# Patient Record
Sex: FEMALE | HISPANIC OR LATINO | Employment: FULL TIME | URBAN - METROPOLITAN AREA
[De-identification: names, ages, dates, MRNs, and addresses within clinical notes are randomized per-mention and may not be internally consistent; named-entity substitution may affect disease eponyms.]

---

## 2020-07-02 ENCOUNTER — HOSPITAL ENCOUNTER (OUTPATIENT)
Dept: LAB | Facility: MEDICAL CENTER | Age: 25
End: 2020-07-02
Attending: PHYSICIAN ASSISTANT
Payer: COMMERCIAL

## 2020-07-02 LAB — CYTOLOGY REG CYTOL: NORMAL

## 2020-07-02 PROCEDURE — 88175 CYTOPATH C/V AUTO FLUID REDO: CPT

## 2020-08-10 ENCOUNTER — HOSPITAL ENCOUNTER (OUTPATIENT)
Dept: HOSPITAL 8 - OR | Age: 25
Setting detail: OBSERVATION
Discharge: HOME | End: 2020-08-10
Attending: EMERGENCY MEDICINE | Admitting: EMERGENCY MEDICINE
Payer: COMMERCIAL

## 2020-08-10 VITALS — BODY MASS INDEX: 33.59 KG/M2 | HEIGHT: 63 IN | WEIGHT: 189.6 LBS

## 2020-08-10 VITALS — SYSTOLIC BLOOD PRESSURE: 115 MMHG | DIASTOLIC BLOOD PRESSURE: 61 MMHG

## 2020-08-10 DIAGNOSIS — Z91.040: ICD-10-CM

## 2020-08-10 DIAGNOSIS — K66.1: ICD-10-CM

## 2020-08-10 DIAGNOSIS — Z03.818: Primary | ICD-10-CM

## 2020-08-10 DIAGNOSIS — N83.201: ICD-10-CM

## 2020-08-10 PROCEDURE — 87635 SARS-COV-2 COVID-19 AMP PRB: CPT

## 2020-08-10 PROCEDURE — 58662 LAPAROSCOPY EXCISE LESIONS: CPT

## 2020-08-10 PROCEDURE — G0378 HOSPITAL OBSERVATION PER HR: HCPCS

## 2020-08-10 PROCEDURE — 99284 EMERGENCY DEPT VISIT MOD MDM: CPT

## 2020-08-10 PROCEDURE — 88304 TISSUE EXAM BY PATHOLOGIST: CPT

## 2020-08-10 RX ADMIN — HYDROMORPHONE HYDROCHLORIDE PRN MG: 1 INJECTION, SOLUTION INTRAMUSCULAR; INTRAVENOUS; SUBCUTANEOUS at 06:19

## 2020-08-10 RX ADMIN — HYDROMORPHONE HYDROCHLORIDE PRN MG: 1 INJECTION, SOLUTION INTRAMUSCULAR; INTRAVENOUS; SUBCUTANEOUS at 06:24

## 2020-08-10 RX ADMIN — FENTANYL CITRATE PRN MCG: 50 INJECTION INTRAMUSCULAR; INTRAVENOUS at 06:11

## 2020-08-10 RX ADMIN — FENTANYL CITRATE PRN MCG: 50 INJECTION INTRAMUSCULAR; INTRAVENOUS at 06:16

## 2020-08-10 NOTE — NUR
Patient presents to ER c/o low quad abd pain, mostly in the RLQ. Patient was 
sent over from [facility] for gyn consult. Patient states she was dx with cysts on 
ovaries.

Patient is in NAD. Respirations even and unlabored.

## 2020-08-14 ENCOUNTER — HOSPITAL ENCOUNTER (EMERGENCY)
Dept: HOSPITAL 8 - ED | Age: 25
Discharge: HOME | End: 2020-08-14
Payer: COMMERCIAL

## 2020-08-14 VITALS — BODY MASS INDEX: 33.28 KG/M2 | WEIGHT: 187.83 LBS | HEIGHT: 63 IN

## 2020-08-14 VITALS — SYSTOLIC BLOOD PRESSURE: 125 MMHG | DIASTOLIC BLOOD PRESSURE: 60 MMHG

## 2020-08-14 DIAGNOSIS — R68.83: ICD-10-CM

## 2020-08-14 DIAGNOSIS — N94.6: Primary | ICD-10-CM

## 2020-08-14 DIAGNOSIS — R10.2: ICD-10-CM

## 2020-08-14 LAB
ALBUMIN SERPL-MCNC: 3.5 G/DL (ref 3.4–5)
ALP SERPL-CCNC: 107 U/L (ref 45–117)
ALT SERPL-CCNC: 16 U/L (ref 12–78)
ANION GAP SERPL CALC-SCNC: 3 MMOL/L (ref 5–15)
BASOPHILS # BLD AUTO: 0.03 X10^3/UL (ref 0–0.1)
BASOPHILS NFR BLD AUTO: 0 % (ref 0–1)
BILIRUB SERPL-MCNC: 0.4 MG/DL (ref 0.2–1)
CALCIUM SERPL-MCNC: 8.5 MG/DL (ref 8.5–10.1)
CHLORIDE SERPL-SCNC: 110 MMOL/L (ref 98–107)
CREAT SERPL-MCNC: 0.85 MG/DL (ref 0.55–1.02)
EOSINOPHIL # BLD AUTO: 0.05 X10^3/UL (ref 0–0.4)
EOSINOPHIL NFR BLD AUTO: 1 % (ref 1–7)
ERYTHROCYTE [DISTWIDTH] IN BLOOD BY AUTOMATED COUNT: 14.1 % (ref 9.6–15.2)
LYMPHOCYTES # BLD AUTO: 1.88 X10^3/UL (ref 1–3.4)
LYMPHOCYTES NFR BLD AUTO: 25 % (ref 22–44)
MCH RBC QN AUTO: 27.7 PG (ref 27–34.8)
MCHC RBC AUTO-ENTMCNC: 32.3 G/DL (ref 32.4–35.8)
MCV RBC AUTO: 85.7 FL (ref 80–100)
MD: NO
MICROSCOPIC: (no result)
MONOCYTES # BLD AUTO: 0.4 X10^3/UL (ref 0.2–0.8)
MONOCYTES NFR BLD AUTO: 5 % (ref 2–9)
NEUTROPHILS # BLD AUTO: 5.26 X10^3/UL (ref 1.8–6.8)
NEUTROPHILS NFR BLD AUTO: 69 % (ref 42–75)
PLATELET # BLD AUTO: 240 X10^3/UL (ref 130–400)
PMV BLD AUTO: 8.8 FL (ref 7.4–10.4)
PROT SERPL-MCNC: 7.5 G/DL (ref 6.4–8.2)
RBC # BLD AUTO: 4.68 X10^6/UL (ref 3.82–5.3)

## 2020-08-14 PROCEDURE — 76830 TRANSVAGINAL US NON-OB: CPT

## 2020-08-14 PROCEDURE — 96374 THER/PROPH/DIAG INJ IV PUSH: CPT

## 2020-08-14 PROCEDURE — 96361 HYDRATE IV INFUSION ADD-ON: CPT

## 2020-08-14 PROCEDURE — 81001 URINALYSIS AUTO W/SCOPE: CPT

## 2020-08-14 PROCEDURE — 36415 COLL VENOUS BLD VENIPUNCTURE: CPT

## 2020-08-14 PROCEDURE — 80053 COMPREHEN METABOLIC PANEL: CPT

## 2020-08-14 PROCEDURE — 87086 URINE CULTURE/COLONY COUNT: CPT

## 2020-08-14 PROCEDURE — 99284 EMERGENCY DEPT VISIT MOD MDM: CPT

## 2020-08-14 PROCEDURE — 85025 COMPLETE CBC W/AUTO DIFF WBC: CPT

## 2020-08-14 PROCEDURE — 83690 ASSAY OF LIPASE: CPT

## 2020-08-14 NOTE — NUR
PT AMBULATORY TO ROOM 19 W/ C/O RLQ ABD PAIN/CRAMPING. PT STATES SHE HAD 
OVARIAN TORSION SURGERY ON MONDAY AND SHE STARTED HER MENSTRUAL CYCLE 2 DAYS 
AGO AND WAS DOING WELL BUT TODAY HAD INCREASING PAIN/CRAMPING. STATES SHE HAS 
NEVER HAD PAIN LIKE THAT BEFORE. PT RESTING ON GURNEY. NADN. VSS. MONITORS 
APPLIED. PIV INITIATED. MEDICATED PER MAR.

## 2020-12-27 ENCOUNTER — HOSPITAL ENCOUNTER (EMERGENCY)
Facility: MEDICAL CENTER | Age: 25
End: 2020-12-27
Attending: EMERGENCY MEDICINE
Payer: COMMERCIAL

## 2020-12-27 VITALS
OXYGEN SATURATION: 99 % | DIASTOLIC BLOOD PRESSURE: 78 MMHG | TEMPERATURE: 98.5 F | HEART RATE: 95 BPM | SYSTOLIC BLOOD PRESSURE: 132 MMHG | RESPIRATION RATE: 17 BRPM

## 2020-12-27 DIAGNOSIS — B34.9 VIRAL SYNDROME: ICD-10-CM

## 2020-12-27 DIAGNOSIS — O21.9 NAUSEA AND VOMITING IN PREGNANCY: ICD-10-CM

## 2020-12-27 LAB
ALBUMIN SERPL BCP-MCNC: 3.6 G/DL (ref 3.2–4.9)
ALBUMIN/GLOB SERPL: 1.1 G/DL
ALP SERPL-CCNC: 76 U/L (ref 30–99)
ALT SERPL-CCNC: 6 U/L (ref 2–50)
ANION GAP SERPL CALC-SCNC: 11 MMOL/L (ref 7–16)
APPEARANCE UR: CLEAR
AST SERPL-CCNC: 14 U/L (ref 12–45)
BASOPHILS # BLD AUTO: 0.2 % (ref 0–1.8)
BASOPHILS # BLD: 0.01 K/UL (ref 0–0.12)
BILIRUB SERPL-MCNC: 0.2 MG/DL (ref 0.1–1.5)
BILIRUB UR QL STRIP.AUTO: NEGATIVE
BUN SERPL-MCNC: 5 MG/DL (ref 8–22)
CALCIUM SERPL-MCNC: 8.7 MG/DL (ref 8.5–10.5)
CHLORIDE SERPL-SCNC: 102 MMOL/L (ref 96–112)
CO2 SERPL-SCNC: 19 MMOL/L (ref 20–33)
COLOR UR: YELLOW
COVID ORDER STATUS COVID19: NORMAL
CREAT SERPL-MCNC: 0.57 MG/DL (ref 0.5–1.4)
EOSINOPHIL # BLD AUTO: 0.01 K/UL (ref 0–0.51)
EOSINOPHIL NFR BLD: 0.2 % (ref 0–6.9)
ERYTHROCYTE [DISTWIDTH] IN BLOOD BY AUTOMATED COUNT: 45.1 FL (ref 35.9–50)
FLUAV RNA SPEC QL NAA+PROBE: NEGATIVE
FLUBV RNA SPEC QL NAA+PROBE: NEGATIVE
GLOBULIN SER CALC-MCNC: 3.2 G/DL (ref 1.9–3.5)
GLUCOSE SERPL-MCNC: 91 MG/DL (ref 65–99)
GLUCOSE UR STRIP.AUTO-MCNC: NEGATIVE MG/DL
HCT VFR BLD AUTO: 40.3 % (ref 37–47)
HGB BLD-MCNC: 13 G/DL (ref 12–16)
IMM GRANULOCYTES # BLD AUTO: 0.01 K/UL (ref 0–0.11)
IMM GRANULOCYTES NFR BLD AUTO: 0.2 % (ref 0–0.9)
KETONES UR STRIP.AUTO-MCNC: ABNORMAL MG/DL
LEUKOCYTE ESTERASE UR QL STRIP.AUTO: NEGATIVE
LIPASE SERPL-CCNC: 24 U/L (ref 11–82)
LYMPHOCYTES # BLD AUTO: 0.71 K/UL (ref 1–4.8)
LYMPHOCYTES NFR BLD: 14.3 % (ref 22–41)
MCH RBC QN AUTO: 27.8 PG (ref 27–33)
MCHC RBC AUTO-ENTMCNC: 32.3 G/DL (ref 33.6–35)
MCV RBC AUTO: 86.3 FL (ref 81.4–97.8)
MICRO URNS: ABNORMAL
MONOCYTES # BLD AUTO: 0.43 K/UL (ref 0–0.85)
MONOCYTES NFR BLD AUTO: 8.7 % (ref 0–13.4)
NEUTROPHILS # BLD AUTO: 3.78 K/UL (ref 2–7.15)
NEUTROPHILS NFR BLD: 76.4 % (ref 44–72)
NITRITE UR QL STRIP.AUTO: NEGATIVE
NRBC # BLD AUTO: 0 K/UL
NRBC BLD-RTO: 0 /100 WBC
PH UR STRIP.AUTO: 6.5 [PH] (ref 5–8)
PLATELET # BLD AUTO: 173 K/UL (ref 164–446)
PMV BLD AUTO: 11.5 FL (ref 9–12.9)
POTASSIUM SERPL-SCNC: 3.8 MMOL/L (ref 3.6–5.5)
PROT SERPL-MCNC: 6.8 G/DL (ref 6–8.2)
PROT UR QL STRIP: NEGATIVE MG/DL
RBC # BLD AUTO: 4.67 M/UL (ref 4.2–5.4)
RBC UR QL AUTO: NEGATIVE
SARS-COV-2 RNA RESP QL NAA+PROBE: DETECTED
SODIUM SERPL-SCNC: 132 MMOL/L (ref 135–145)
SP GR UR STRIP.AUTO: 1.02
SPECIMEN SOURCE: ABNORMAL
UROBILINOGEN UR STRIP.AUTO-MCNC: 1 MG/DL
WBC # BLD AUTO: 5 K/UL (ref 4.8–10.8)

## 2020-12-27 PROCEDURE — 99284 EMERGENCY DEPT VISIT MOD MDM: CPT

## 2020-12-27 PROCEDURE — U0003 INFECTIOUS AGENT DETECTION BY NUCLEIC ACID (DNA OR RNA); SEVERE ACUTE RESPIRATORY SYNDROME CORONAVIRUS 2 (SARS-COV-2) (CORONAVIRUS DISEASE [COVID-19]), AMPLIFIED PROBE TECHNIQUE, MAKING USE OF HIGH THROUGHPUT TECHNOLOGIES AS DESCRIBED BY CMS-2020-01-R: HCPCS

## 2020-12-27 PROCEDURE — C9803 HOPD COVID-19 SPEC COLLECT: HCPCS | Performed by: EMERGENCY MEDICINE

## 2020-12-27 PROCEDURE — 80053 COMPREHEN METABOLIC PANEL: CPT

## 2020-12-27 PROCEDURE — 85025 COMPLETE CBC W/AUTO DIFF WBC: CPT

## 2020-12-27 PROCEDURE — 87502 INFLUENZA DNA AMP PROBE: CPT

## 2020-12-27 PROCEDURE — 83690 ASSAY OF LIPASE: CPT

## 2020-12-27 PROCEDURE — 81003 URINALYSIS AUTO W/O SCOPE: CPT

## 2020-12-27 PROCEDURE — 99283 EMERGENCY DEPT VISIT LOW MDM: CPT

## 2020-12-27 PROCEDURE — 700105 HCHG RX REV CODE 258: Performed by: EMERGENCY MEDICINE

## 2020-12-27 RX ORDER — ONDANSETRON 4 MG/1
4 TABLET, ORALLY DISINTEGRATING ORAL EVERY 6 HOURS PRN
Qty: 10 TAB | Refills: 0 | Status: SHIPPED | OUTPATIENT
Start: 2020-12-27 | End: 2021-03-06

## 2020-12-27 RX ORDER — SODIUM CHLORIDE 9 MG/ML
1000 INJECTION, SOLUTION INTRAVENOUS ONCE
Status: COMPLETED | OUTPATIENT
Start: 2020-12-27 | End: 2020-12-27

## 2020-12-27 RX ADMIN — SODIUM CHLORIDE 1000 ML: 9 INJECTION, SOLUTION INTRAVENOUS at 08:29

## 2020-12-27 NOTE — ED PROVIDER NOTES
ED Provider Note    CHIEF COMPLAINT  Chief Complaint   Patient presents with   • Flu Like Symptoms     x 1 day. N/V, congestion, febrile @ 100F at home 1 x 500mg tylenol, body aches, chills.   • Pregnancy     17wks preg.        HPI  Barbara David is a 25 y.o. female who presents to the emergency department with complaint of flulike symptoms.  Headache, body ache, backache is increased in severity for the last 3 days.  She is 17 weeks pregnant she is a  female.  In addition, she does endorse slight uterine cramping that is intermittent, no vaginal bleeding, vaginal discharge.  She does follow-up with an OB gynecologist for this pregnancy has had no problems thus far.  Patient has had a subjective fever took Tylenol for pain recuperated from the fever.  The patient denies, shakes, chills, sweats, dysuria, hematochezia, melena, hematemesis, vaginal bleeding, vaginal discharge.    REVIEW OF SYSTEMS  Positives as above. Pertinent negatives include marily pain, dysuria, [place], melena, hematemesis all other 10 review of systems are negative    PAST MEDICAL HISTORY  Past Medical History:   Diagnosis Date   • Family history of diabetes mellitus (DM) 2016   • GBS (group B Streptococcus carrier), +RV culture, currently pregnant 2015   • Late prenatal care-1st visit at 22wks 2015   • Supervision of normal first teen pregnancy 2015   • Urinary tract infection, site not specified        FAMILY HISTORY  Noncontributory    SOCIAL HISTORY  Social History     Socioeconomic History   • Marital status: Single     Spouse name: Not on file   • Number of children: Not on file   • Years of education: Not on file   • Highest education level: Not on file   Occupational History   • Not on file   Social Needs   • Financial resource strain: Not on file   • Food insecurity     Worry: Not on file     Inability: Not on file   • Transportation needs     Medical: Not on file     Non-medical: Not on  file   Tobacco Use   • Smoking status: Never Smoker   • Smokeless tobacco: Never Used   Substance and Sexual Activity   • Alcohol use: Yes     Comment: occ   • Drug use: No   • Sexual activity: Yes     Partners: Male     Comment: none   Lifestyle   • Physical activity     Days per week: Not on file     Minutes per session: Not on file   • Stress: Not on file   Relationships   • Social connections     Talks on phone: Not on file     Gets together: Not on file     Attends [employer] service: Not on file     Active member of club or organization: Not on file     Attends meetings of clubs or organizations: Not on file     Relationship status: Not on file   • Intimate partner violence     Fear of current or ex partner: Not on file     Emotionally abused: Not on file     Physically abused: Not on file     Forced sexual activity: Not on file   Other Topics Concern   • Not on file   Social History Narrative   • Not on file       SURGICAL HISTORY  History reviewed. No pertinent surgical history.    CURRENT MEDICATIONS  Home Medications    **Home medications have not yet been reviewed for this encounter**         ALLERGIES  Allergies   Allergen Reactions   • Codeine Swelling       PHYSICAL EXAM  VITAL SIGNS: /78   Pulse 95   Temp 36.9 °C (98.5 °F) (Temporal)   Resp 17   SpO2 99%      Constitutional: Well developed, Well nourished, No acute distress, Non-toxic appearance.   Eyes: PERRLA, EOMI, Conjunctiva normal, No discharge.   Cardiovascular: Tachycardic,  No murmurs, No rubs, No gallops, and intact distal pulses.   Thorax & Lungs:  No respiratory distress, no rales, no rhonchi, No wheezing, No chest wall tenderness.   Abdomen: Bowel sounds normal, Soft, fundal height approximately 3 cm inferior of the umbilicus negative Mejia sign no tenderness, No guarding, No rebound, No pulsatile masses.   Skin: Warm, Dry, No erythema, No rash.  Back: No CVA tenderness  Pelvic: Presence of female nurse, no external lesion or  discharge, speculum exam shows significant greenish discharge, bimanual examination no cervical motion tenderness, no axial tenderness bilaterally no vaginal bleeding  Extremities: Full range of motion, no deformity, no edema.  Neurologic: Alert & oriented x 3, No focal deficits noted, acting appropriately on exam.  Psychiatric: Affect normal for clinical presentation.      RADIOLOGY/PROCEDURES  Fetal heart tones  Results for orders placed or performed during the hospital encounter of 12/27/20   CBC WITH DIFFERENTIAL   Result Value Ref Range    WBC 5.0 4.8 - 10.8 K/uL    RBC 4.67 4.20 - 5.40 M/uL    Hemoglobin 13.0 12.0 - 16.0 g/dL    Hematocrit 40.3 37.0 - 47.0 %    MCV 86.3 81.4 - 97.8 fL    MCH 27.8 27.0 - 33.0 pg    MCHC 32.3 (L) 33.6 - 35.0 g/dL    RDW 45.1 35.9 - 50.0 fL    Platelet Count 173 164 - 446 K/uL    MPV 11.5 9.0 - 12.9 fL    Neutrophils-Polys 76.40 (H) 44.00 - 72.00 %    Lymphocytes 14.30 (L) 22.00 - 41.00 %    Monocytes 8.70 0.00 - 13.40 %    Eosinophils 0.20 0.00 - 6.90 %    Basophils 0.20 0.00 - 1.80 %    Immature Granulocytes 0.20 0.00 - 0.90 %    Nucleated RBC 0.00 /100 WBC    Neutrophils (Absolute) 3.78 2.00 - 7.15 K/uL    Lymphs (Absolute) 0.71 (L) 1.00 - 4.80 K/uL    Monos (Absolute) 0.43 0.00 - 0.85 K/uL    Eos (Absolute) 0.01 0.00 - 0.51 K/uL    Baso (Absolute) 0.01 0.00 - 0.12 K/uL    Immature Granulocytes (abs) 0.01 0.00 - 0.11 K/uL    NRBC (Absolute) 0.00 K/uL   COMP METABOLIC PANEL   Result Value Ref Range    Sodium 132 (L) 135 - 145 mmol/L    Potassium 3.8 3.6 - 5.5 mmol/L    Chloride 102 96 - 112 mmol/L    Co2 19 (L) 20 - 33 mmol/L    Anion Gap 11.0 7.0 - 16.0    Glucose 91 65 - 99 mg/dL    Bun 5 (L) 8 - 22 mg/dL    Creatinine 0.57 0.50 - 1.40 mg/dL    Calcium 8.7 8.5 - 10.5 mg/dL    AST(SGOT) 14 12 - 45 U/L    ALT(SGPT) 6 2 - 50 U/L    Alkaline Phosphatase 76 30 - 99 U/L    Total Bilirubin 0.2 0.1 - 1.5 mg/dL    Albumin 3.6 3.2 - 4.9 g/dL    Total Protein 6.8 6.0 - 8.2 g/dL     Globulin 3.2 1.9 - 3.5 g/dL    A-G Ratio 1.1 g/dL   LIPASE   Result Value Ref Range    Lipase 24 11 - 82 U/L   URINALYSIS    Specimen: Urine   Result Value Ref Range    Color Yellow     Character Clear     Specific Gravity 1.023 <1.035    Ph 6.5 5.0 - 8.0    Glucose Negative Negative mg/dL    Ketones Trace (A) Negative mg/dL    Protein Negative Negative mg/dL    Bilirubin Negative Negative    Urobilinogen, Urine 1.0 Negative    Nitrite Negative Negative    Leukocyte Esterase Negative Negative    Occult Blood Negative Negative    Micro Urine Req see below    COVID/SARS CoV-2 PCR    Specimen: Nasopharyngeal; Respirate   Result Value Ref Range    COVID Order Status Received    Influenza A/B By PCR (Adult - Flu Only)    Specimen: Nasopharyngeal; Respirate   Result Value Ref Range    Influenza virus A RNA Negative Negative    Influenza virus B, PCR Negative Negative   ESTIMATED GFR   Result Value Ref Range    GFR If African American >60 >60 mL/min/1.73 m 2    GFR If Non African American >60 >60 mL/min/1.73 m 2   SARS-CoV-2, PCR (In-House)   Result Value Ref Range    SARS-CoV-2 Source NP Swab      COURSE & MEDICAL DECISION MAKING  Pertinent Labs & Imaging studies reviewed. (See chart for details)  This is a pleasant 25-year-old female presents 17 weeks pregnant.  The patient normal fetal heart tones I do not believe she is having premature labor at this point time.  The patient no abdominal tenderness suspicious for appendicitis, ovarian torsion, ovarian cyst, any significant surgical condition.  Urinalysis was negative for infection and influenza was negative as well.  IV is established, she received 1 L normal saline econdary to her dehydrated state and tachycardia.  Reevaluation, she had a normal heart rate, moist mucous membranes, states she does not feel dehydrated.  In addition, the patient received Zofran 4 mg IV.  A Covid test was obtained and she understands to follow-up with results tomorrow with strict return  precautions have been given.  She understands the discharge instructions if she is positive for Covid.  The patient will be following up with her OB gynecologist and strict return precautions have been given for increasing symptomatology.  Her discharge, she is positive p.o., she is asymptomatic.    FINAL IMPRESSION     1. Viral syndrome Active   2. Nausea and vomiting in pregnancy Active       DISPOSITION:  Patient will be discharged home in stable condition.    FOLLOW UP:  [facility], Emergency Dept  [address] [facility] [address] [phone]    If symptoms worsen      OUTPATIENT MEDICATIONS:  Discharge Medication List as of 12/27/2020 10:18 AM      START taking these medications    Details   ondansetron (ZOFRAN ODT) 4 MG TABLET DISPERSIBLE Take 1 Tab by mouth every 6 hours as needed for Nausea., Disp-10 Tab, R-0, Normal               Electronically signed by: Griffin Marquis D.O., 12/27/2020 8:15 AM

## 2020-12-27 NOTE — DISCHARGE INSTRUCTIONS
You have been tested for COVID-19 today.  Your results are pending.  Please act as if these results are positive and self isolate until you receive the results.  Make sure you still wear a mask, social distance and practice good hand hygiene no matter the result.  In order to receive the results you will need to log into your [employer] on the [employer] website.  If you do not have an account you can create an account.  You can login or create an account for my chart at [email or web address].  If your symptoms worsen to a point that you become so short of breath you can only walk a very short distances prior to fatigue or feel you were unable to manage your symptoms at home, please return to the emergency department.  For a more objective approach you can buy a pulse oximeter online.  [employer] has multiple to choose from.  If your oxygen saturation in these devices is persistently below 90% please return to the ER.

## 2020-12-27 NOTE — ED TRIAGE NOTES
Chief Complaint   Patient presents with   • Flu Like Symptoms     x 1 day. N/V, congestion, febrile @ 100F at home 1 x 500mg tylenol, body aches, chills.   • Pregnancy     17wks preg.      Pt began having Flu like s/s 1 day PTA. 17 wks preg. N/V, chills, febrile, body aches. Non productive dry cough. Denies CP.    /75   Pulse (!) 104   Temp 36.9 °C (98.5 °F) (Temporal)   Resp 16   SpO2 98%

## 2020-12-27 NOTE — ED NOTES
Pt provided with written and verbal discharge instructions. Instructed to follow up with OB or return with worsening s/s. Denies nausea at this time. Tolerating PO intake. Instructed on proper quarantine until test results. Pt verbalized understanding. IV removed. Exited the ED without incident.

## 2020-12-27 NOTE — ED NOTES
Break RN:  Patient ambulatory to restroom to obtain a Urine Specimen. Pt was given instruction on proper collection- Pt verbalized understanding. Pt to return specimen cup to room when finished.

## 2021-01-26 LAB
ABO GROUP BLD: NORMAL
HBV SURFACE AG SERPL QL IA: NEGATIVE
HIV 1+2 AB+HIV1 P24 AG SERPL QL IA: NON REACTIVE
RH BLD: NORMAL
RUBV IGG SERPL IA-ACNC: NORMAL
TREPONEMA PALLIDUM IGG+IGM AB [PRESENCE] IN SERUM OR PLASMA BY IMMUNOASSAY: NON REACTIVE

## 2021-01-30 ENCOUNTER — OFFICE VISIT (OUTPATIENT)
Dept: URGENT CARE | Facility: CLINIC | Age: 26
End: 2021-01-30
Payer: COMMERCIAL

## 2021-01-30 VITALS
HEART RATE: 78 BPM | RESPIRATION RATE: 16 BRPM | DIASTOLIC BLOOD PRESSURE: 60 MMHG | BODY MASS INDEX: 30.9 KG/M2 | TEMPERATURE: 98.3 F | SYSTOLIC BLOOD PRESSURE: 90 MMHG | HEIGHT: 64 IN | OXYGEN SATURATION: 97 % | WEIGHT: 181 LBS

## 2021-01-30 DIAGNOSIS — O26.892 ABDOMINAL PAIN DURING PREGNANCY IN SECOND TRIMESTER: ICD-10-CM

## 2021-01-30 DIAGNOSIS — R82.998 LEUKOCYTES IN URINE: ICD-10-CM

## 2021-01-30 DIAGNOSIS — R10.9 ABDOMINAL PAIN DURING PREGNANCY IN SECOND TRIMESTER: ICD-10-CM

## 2021-01-30 DIAGNOSIS — R10.31 RIGHT LOWER QUADRANT ABDOMINAL PAIN: ICD-10-CM

## 2021-01-30 LAB
APPEARANCE UR: CLEAR
BILIRUB UR STRIP-MCNC: NEGATIVE MG/DL
COLOR UR AUTO: YELLOW
GLUCOSE UR STRIP.AUTO-MCNC: NEGATIVE MG/DL
KETONES UR STRIP.AUTO-MCNC: NORMAL MG/DL
LEUKOCYTE ESTERASE UR QL STRIP.AUTO: NORMAL
NITRITE UR QL STRIP.AUTO: NEGATIVE
PH UR STRIP.AUTO: 7.5 [PH] (ref 5–8)
PROT UR QL STRIP: NEGATIVE MG/DL
RBC UR QL AUTO: NEGATIVE
SP GR UR STRIP.AUTO: 1.02
UROBILINOGEN UR STRIP-MCNC: 0.2 MG/DL

## 2021-01-30 PROCEDURE — 99202 OFFICE O/P NEW SF 15 MIN: CPT | Performed by: NURSE PRACTITIONER

## 2021-01-30 PROCEDURE — 81002 URINALYSIS NONAUTO W/O SCOPE: CPT | Performed by: NURSE PRACTITIONER

## 2021-01-30 ASSESSMENT — ENCOUNTER SYMPTOMS
VOMITING: 1
FEVER: 0
NAUSEA: 1
ABDOMINAL PAIN: 1

## 2021-01-30 ASSESSMENT — FIBROSIS 4 INDEX: FIB4 SCORE: 0.83

## 2021-01-30 NOTE — LETTER
[facility]  [address]  [place] [address]     January 30, 2021    Patient: Barbara David   YOB: 1995   Date of Visit: 1/30/2021       To Whom It May Concern:    Barbara David was seen and treated in our department on 1/30/2021.     Sincerely,     Mike Raines, Med Ass't

## 2021-01-30 NOTE — LETTER
January 30, 2021    To Whom It May Concern:         This is confirmation that Barbara Mayer David attended her scheduled appointment with BRADY Vigil on 1/30/21.         If you have any questions please do not hesitate to call me at the phone number listed below.    Sincerely,          Mike Raines, Med Ass't  [phone]

## 2021-01-30 NOTE — PROGRESS NOTES
"  Subjective:     Barbara David is a 25 y.o. female who presents for Abdominal Pain (x 1 week, RLQ/RUQ, pain when having BM,6/10)      Presents for RLQ abdominal pain. Abdominal pain increased with bowel movements, has had a BM a least once daily. Pain on right side, lasts a couple hours after BM. Pressure to push with BM increases pain. Pain to RLQ just now with urination. Pain 6/10 at it's worse. Feels like a stomach cramp, \"really bad period day\".  No dysuria.   No rectal pain with BM, or blood in stool. N/V normal throughout pregnancy. Pain resolves with laying down. Ob/GYN thought it was constipation. Feels fetal  Movement. Denies vaginal bleeding.  Has OB/GYN appt on Monday. Hx of UTIs. LMP 9/2020. Est 19-20 weeks regnant.     Abdominal Pain  This is a new problem. The current episode started in the past 7 days. The problem occurs intermittently. The problem has been gradually worsening. The pain is located in the RLQ and RUQ. The pain is at a severity of 4/10. The pain is moderate. The quality of the pain is cramping. Associated symptoms include nausea and vomiting. Pertinent negatives include no dysuria, fever or hematuria. The pain is aggravated by bowel movement and urination.       Past Medical History:   Diagnosis Date   • Family history of diabetes mellitus (DM) 6/2/2016   • GBS (group B Streptococcus carrier), +RV culture, currently pregnant 4/20/2015   • Late prenatal care-1st visit at 22wks 1/7/2015   • Supervision of normal first teen pregnancy 1/7/2015   • Urinary tract infection, site not specified 2014       History reviewed. No pertinent surgical history.    Social History     Socioeconomic History   • Marital status: Single     Spouse name: Not on file   • Number of children: Not on file   • Years of education: Not on file   • Highest education level: Not on file   Occupational History   • Not on file   Social Needs   • Financial resource strain: Not on file   • Food insecurity " "    Worry: Not on file     Inability: Not on file   • Transportation needs     Medical: Not on file     Non-medical: Not on file   Tobacco Use   • Smoking status: Never Smoker   • Smokeless tobacco: Never Used   Substance and Sexual Activity   • Alcohol use: Not Currently     Comment: occ   • Drug use: No   • Sexual activity: Yes     Partners: Male     Comment: none   Lifestyle   • Physical activity     Days per week: Not on file     Minutes per session: Not on file   • Stress: Not on file   Relationships   • Social connections     Talks on phone: Not on file     Gets together: Not on file     Attends Mosque service: Not on file     Active member of club or organization: Not on file     Attends meetings of clubs or organizations: Not on file     Relationship status: Not on file   • Intimate partner violence     Fear of current or ex partner: Not on file     Emotionally abused: Not on file     Physically abused: Not on file     Forced sexual activity: Not on file   Other Topics Concern   • Not on file   Social History Narrative   • Not on file        Family History   Problem Relation Age of Onset   • Heart Disease Maternal Grandfather    • Diabetes Father    • Alcohol/Drug Paternal Grandfather    • Diabetes Paternal Grandfather    • Diabetes Paternal Aunt    • Stroke Neg Hx    • Cancer Neg Hx    • Lung Disease Neg Hx    • Hypertension Neg Hx         Allergies   Allergen Reactions   • Codeine Swelling       Review of Systems   Constitutional: Negative for fever.   Respiratory: Negative for shortness of breath.    Cardiovascular: Negative for chest pain.   Gastrointestinal: Positive for abdominal pain, nausea and vomiting. Negative for blood in stool.        Harder stools.    Genitourinary: Negative for dysuria, flank pain and hematuria.   All other systems reviewed and are negative.       Objective:   BP (!) 90/60   Pulse 78   Temp 36.8 °C (98.3 °F) (Temporal)   Resp 16   Ht 1.613 m (5' 3.5\")   Wt 82.1 kg (181 " lb)   SpO2 97%   BMI 31.56 kg/m²     Physical Exam  Vitals signs reviewed.   Constitutional:       General: She is not in acute distress.     Appearance: She is well-developed.   HENT:      Head: Normocephalic and atraumatic.      Right Ear: External ear normal.      Left Ear: External ear normal.      Nose: Nose normal.   Eyes:      Conjunctiva/sclera: Conjunctivae normal.   Neck:      Musculoskeletal: Normal range of motion.   Cardiovascular:      Rate and Rhythm: Normal rate.   Pulmonary:      Effort: Pulmonary effort is normal. No respiratory distress.      Breath sounds: Normal breath sounds.   Abdominal:      General: Bowel sounds are normal.      Palpations: Abdomen is soft.      Tenderness: There is abdominal tenderness in the right lower quadrant. There is no right CVA tenderness, left CVA tenderness or rebound.      Comments: Elevated fundus. Just below umbilicus.   Musculoskeletal: Normal range of motion.   Skin:     General: Skin is warm and dry.      Findings: No rash.   Neurological:      General: No focal deficit present.      Mental Status: She is alert and oriented to person, place, and time.      GCS: GCS eye subscore is 4. GCS verbal subscore is 5. GCS motor subscore is 6.   Psychiatric:         Mood and Affect: Mood normal.         Speech: Speech normal.         Behavior: Behavior normal.         Thought Content: Thought content normal.         Judgment: Judgment normal.         Assessment/Plan:   1. Abdominal pain during pregnancy in second trimester  - POCT Urinalysis    2. Right lower quadrant abdominal pain  - POCT Urinalysis    3. Leukocytes in urine  - POCT Urinalysis    Results for orders placed or performed in visit on 01/30/21   POCT Urinalysis   Result Value Ref Range    POC Color yellow Negative    POC Appearance clear Negative    POC Leukocyte Esterase Trace Negative    POC Nitrites negative Negative    POC Urobiligen 0.2 Negative (0.2) mg/dL    POC Protein negative Negative mg/dL     POC Urine PH 7.5 5.0 - 8.0    POC Blood negative Negative    POC Specific Gravity 1.025 <1.005 - >1.030    POC Ketones Trace Negative mg/dL    POC Bilirubin negative Negative mg/dL    POC Glucose negative Negative mg/dL   Discussed follow up in the ER for further evaluation of cramping RLQ pain. Reported pain 6/10. Discussed risks and benefits. Patient agrees with plan. Previously discussed increasing water intake, activity level, and dietary fiber for harder stools.     Differential diagnosis, natural history, supportive care, and indications for immediate follow-up discussed.    My total time spent caring for the patient on the day of the encounter was 20 minutes.   This does not include time spent on separately billable procedures/tests.

## 2021-02-04 ASSESSMENT — ENCOUNTER SYMPTOMS
BLOOD IN STOOL: 0
SHORTNESS OF BREATH: 0
FLANK PAIN: 0

## 2021-02-04 NOTE — PATIENT INSTRUCTIONS
Abdominal Pain During Pregnancy    Abdominal pain is common during pregnancy, and has many possible causes. Some causes are more serious than others, and sometimes the cause is not known. Abdominal pain can be a sign that labor is starting. It can also be caused by normal growth and stretching of muscles and ligaments during pregnancy. Always tell your health care provider if you have any abdominal pain.  Follow these instructions at home:  · Do not have sex or put anything in your vagina until your pain goes away completely.  · Get plenty of rest until your pain improves.  · Drink enough fluid to keep your urine pale yellow.  · Take over-the-counter and prescription medicines only as told by your health care provider.  · Keep all follow-up visits as told by your health care provider. This is important.  Contact a health care provider if:  · Your pain continues or gets worse after resting.  · You have lower abdominal pain that:  ? Comes and goes at regular intervals.  ? Spreads to your back.  ? Is similar to menstrual cramps.  · You have pain or burning when you urinate.  Get help right away if:  · You have a fever or chills.  · You have vaginal bleeding.  · You are leaking fluid from your vagina.  · You are passing tissue from your vagina.  · You have vomiting or diarrhea that lasts for more than 24 hours.  · Your baby is moving less than usual.  · You feel very weak or faint.  · You have shortness of breath.  · You develop severe pain in your upper abdomen.  Summary  · Abdominal pain is common during pregnancy, and has many possible causes.  · If you experience abdominal pain during pregnancy, tell your health care provider right away.  · Follow your health care provider's home care instructions and keep all follow-up visits as directed.  This information is not intended to replace advice given to you by your health care provider. Make sure you discuss any questions you have with your health care  provider.  Document Released: 12/18/2006 Document Revised: 04/06/2020 Document Reviewed: 03/22/2018  Elsevier Patient Education © 2020 [employer]

## 2021-03-06 ENCOUNTER — HOSPITAL ENCOUNTER (OUTPATIENT)
Facility: MEDICAL CENTER | Age: 26
End: 2021-03-06
Attending: PHYSICIAN ASSISTANT
Payer: COMMERCIAL

## 2021-03-06 ENCOUNTER — OFFICE VISIT (OUTPATIENT)
Dept: URGENT CARE | Facility: PHYSICIAN GROUP | Age: 26
End: 2021-03-06
Payer: COMMERCIAL

## 2021-03-06 VITALS
DIASTOLIC BLOOD PRESSURE: 82 MMHG | HEIGHT: 64 IN | BODY MASS INDEX: 32.27 KG/M2 | HEART RATE: 97 BPM | WEIGHT: 189 LBS | OXYGEN SATURATION: 96 % | SYSTOLIC BLOOD PRESSURE: 118 MMHG | TEMPERATURE: 98.2 F

## 2021-03-06 DIAGNOSIS — R35.0 URINARY FREQUENCY: ICD-10-CM

## 2021-03-06 LAB
APPEARANCE UR: CLEAR
BILIRUB UR STRIP-MCNC: NEGATIVE MG/DL
COLOR UR AUTO: YELLOW
GLUCOSE UR STRIP.AUTO-MCNC: NEGATIVE MG/DL
KETONES UR STRIP.AUTO-MCNC: NEGATIVE MG/DL
LEUKOCYTE ESTERASE UR QL STRIP.AUTO: NORMAL
NITRITE UR QL STRIP.AUTO: NEGATIVE
PH UR STRIP.AUTO: 7.5 [PH] (ref 5–8)
PROT UR QL STRIP: NEGATIVE MG/DL
RBC UR QL AUTO: NEGATIVE
SP GR UR STRIP.AUTO: 1.02
UROBILINOGEN UR STRIP-MCNC: 0.2 MG/DL

## 2021-03-06 PROCEDURE — 99213 OFFICE O/P EST LOW 20 MIN: CPT | Performed by: PHYSICIAN ASSISTANT

## 2021-03-06 PROCEDURE — 81002 URINALYSIS NONAUTO W/O SCOPE: CPT | Performed by: PHYSICIAN ASSISTANT

## 2021-03-06 PROCEDURE — 87086 URINE CULTURE/COLONY COUNT: CPT

## 2021-03-06 ASSESSMENT — FIBROSIS 4 INDEX: FIB4 SCORE: 0.86

## 2021-03-06 ASSESSMENT — ENCOUNTER SYMPTOMS
NAUSEA: 0
VOMITING: 0
FEVER: 0
PALPITATIONS: 0
SHORTNESS OF BREATH: 0
BACK PAIN: 0
ABDOMINAL PAIN: 0
FLANK PAIN: 0
CHILLS: 0
COUGH: 0

## 2021-03-06 NOTE — LETTER
March 6, 2021         Patient: Barbara David   YOB: 1995   Date of Visit: 3/6/2021           To Whom it May Concern:    Barbara David was seen in my clinic on 3/6/2021. Please excuse her from work on this day.    If you have any questions or concerns, please don't hesitate to call.        Sincerely,           Robert Lima P.A.-C.  Electronically Signed

## 2021-03-06 NOTE — PROGRESS NOTES
Subjective:   Barbara David is a 26 y.o. female who presents for UTI (uti(2x week; burning with urination, frequency, urgency, fullness) )      UTI  This is a new problem. The current episode started in the past 7 days. The problem occurs constantly. Associated symptoms include urinary symptoms. Pertinent negatives include no abdominal pain, chest pain, chills, coughing, fever, nausea or vomiting. Nothing aggravates the symptoms. She has tried nothing for the symptoms.       Review of Systems   Constitutional: Negative for chills and fever.   Respiratory: Negative for cough and shortness of breath.    Cardiovascular: Negative for chest pain and palpitations.   Gastrointestinal: Negative for abdominal pain, nausea and vomiting.   Genitourinary: Positive for frequency and urgency. Negative for dysuria, flank pain and hematuria.   Musculoskeletal: Negative for back pain.   All other systems reviewed and are negative.      Medications:    • ergocalciferol  • norethindrone-ethinyl estradiol  • ondansetron Tbdp    Allergies: Codeine    Problem List: Barbara David has Midline low back pain without sciatica; Obesity (BMI 30-39.9); Vitamin D deficiency; and Family history of diabetes mellitus (DM) on their problem list.    Surgical History:  No past surgical history on file.    Past Social Hx: Barbara David  reports that she has never smoked. She has never used smokeless tobacco. She reports previous alcohol use. She reports that she does not use drugs.     Past Family Hx:  Barbara David family history includes Alcohol/Drug in her paternal grandfather; Diabetes in her father, paternal aunt, and paternal grandfather; Heart Disease in her maternal grandfather.     Problem list, medications, and allergies reviewed by myself today in Epic.     Objective:     Blood Pressure 118/82 (BP Location: Left arm, Patient Position: Sitting, BP Cuff Size: Adult)   Pulse 97  "  Temperature 36.8 °C (98.2 °F) (Temporal)   Height 1.613 m (5' 3.5\")   Weight 85.7 kg (189 lb)   Oxygen Saturation 96%   Body Mass Index 32.95 kg/m²     Physical Exam  Vitals reviewed.   Constitutional:       Appearance: She is well-developed.   HENT:      Head: Normocephalic and atraumatic.      Right Ear: External ear normal.      Left Ear: External ear normal.      Nose: Nose normal.   Cardiovascular:      Rate and Rhythm: Normal rate and regular rhythm.      Heart sounds: Normal heart sounds.   Pulmonary:      Effort: Pulmonary effort is normal.      Breath sounds: Normal breath sounds.   Abdominal:      General: Bowel sounds are normal. There is no distension.      Palpations: Abdomen is soft. There is no mass.      Tenderness: There is no abdominal tenderness. There is no right CVA tenderness, left CVA tenderness, guarding or rebound.      Hernia: No hernia is present.   Musculoskeletal:      Cervical back: Normal range of motion and neck supple.   Skin:     General: Skin is warm and dry.   Neurological:      Mental Status: She is alert and oriented to person, place, and time.   Psychiatric:         Behavior: Behavior normal.         Thought Content: Thought content normal.         Judgment: Judgment normal.         Assessment/Plan:     Medical Decision Making/Comments     Pt is a pregnant (25 weeks) 26 yr old female who presents for evaluation of urinary frequency.  Pt states she has had frequency and urgency for 4 days.  Denies dysuria, fevers, flank pain/chills, nausea/vomiting, or vaginal discharge/bleeding.  Pt is not diabetic or immunocompromised.  No use of catheters.  Vital signs normal.  Pt does not appear ill or altered mental status.  There is no PTP of the abdomen or CVA tenderness.  UA unremarkable.  Will hold treatment while culture is pending   Diagnosis and associated orders     1. Urinary frequency  POCT Urinalysis    Urine Culture              Differential diagnosis, natural history, " supportive care, and indications for immediate follow-up discussed.    Advised the patient to follow-up with the primary care physician for recheck, reevaluation, and consideration of further management.    Please note that this dictation was created using voice recognition software. I have made a reasonable attempt to correct obvious errors, but I expect that there are errors of grammar and possibly content that I did not discover before finalizing the note.

## 2021-03-09 LAB
BACTERIA UR CULT: NORMAL
SIGNIFICANT IND 70042: NORMAL
SITE SITE: NORMAL
SOURCE SOURCE: NORMAL

## 2021-04-18 ENCOUNTER — OFFICE VISIT (OUTPATIENT)
Dept: URGENT CARE | Facility: PHYSICIAN GROUP | Age: 26
End: 2021-04-18
Payer: COMMERCIAL

## 2021-04-18 VITALS
HEIGHT: 64 IN | HEART RATE: 89 BPM | SYSTOLIC BLOOD PRESSURE: 100 MMHG | BODY MASS INDEX: 32.95 KG/M2 | TEMPERATURE: 98.6 F | OXYGEN SATURATION: 97 % | RESPIRATION RATE: 16 BRPM | DIASTOLIC BLOOD PRESSURE: 58 MMHG | WEIGHT: 193 LBS

## 2021-04-18 DIAGNOSIS — Z3A.30 30 WEEKS GESTATION OF PREGNANCY: ICD-10-CM

## 2021-04-18 DIAGNOSIS — O21.9 NAUSEA AND VOMITING DURING PREGNANCY: ICD-10-CM

## 2021-04-18 LAB
APPEARANCE UR: CLEAR
BILIRUB UR STRIP-MCNC: NEGATIVE MG/DL
COLOR UR AUTO: YELLOW
GLUCOSE UR STRIP.AUTO-MCNC: NEGATIVE MG/DL
KETONES UR STRIP.AUTO-MCNC: NEGATIVE MG/DL
LEUKOCYTE ESTERASE UR QL STRIP.AUTO: NEGATIVE
NITRITE UR QL STRIP.AUTO: NEGATIVE
PH UR STRIP.AUTO: 7.5 [PH] (ref 5–8)
PROT UR QL STRIP: NEGATIVE MG/DL
RBC UR QL AUTO: NORMAL
SP GR UR STRIP.AUTO: 1.02
UROBILINOGEN UR STRIP-MCNC: NORMAL MG/DL

## 2021-04-18 PROCEDURE — 81002 URINALYSIS NONAUTO W/O SCOPE: CPT | Performed by: PHYSICIAN ASSISTANT

## 2021-04-18 PROCEDURE — 99213 OFFICE O/P EST LOW 20 MIN: CPT | Performed by: PHYSICIAN ASSISTANT

## 2021-04-18 RX ORDER — ONDANSETRON 4 MG/1
4 TABLET, FILM COATED ORAL EVERY 6 HOURS PRN
Qty: 20 TABLET | Refills: 0 | Status: ON HOLD | OUTPATIENT
Start: 2021-04-18 | End: 2021-06-14

## 2021-04-18 ASSESSMENT — ENCOUNTER SYMPTOMS
ABDOMINAL PAIN: 0
PALPITATIONS: 0
SHORTNESS OF BREATH: 0
BLURRED VISION: 0
DIZZINESS: 0
CHILLS: 0
FEVER: 0
DIARRHEA: 0
FLANK PAIN: 0
VOMITING: 1
NAUSEA: 1

## 2021-04-18 ASSESSMENT — FIBROSIS 4 INDEX: FIB4 SCORE: 0.86

## 2021-04-18 NOTE — LETTER
April 18, 2021         Patient: Barbara David   YOB: 1995   Date of Visit: 4/18/2021           To Whom it May Concern:    Barbara David was seen in my clinic on 4/18/2021.     If you have any questions or concerns, please don't hesitate to call.        Sincerely,           Micaela Rosas P.A.-C.  Electronically Signed

## 2021-04-18 NOTE — PROGRESS NOTES
Subjective:      Barbara David is a 26 y.o. female who presents with Nausea (pt states her OBGYN wants her to have Zofran nausea x 3 days)    HPI:  This is a new problem. Barbara David is a 26 y.o. female who presents today for evaluation of nausea.  Patient is 30 weeks pregnant.  She states that she had issues with nausea and vomiting in the first trimester of her pregnancy but that all resolved.  She took Zofran for a while at that time help with her symptoms provided significant relief.  She states that over the past few days she has been having a lot of nausea and this morning she had one episode of vomiting.  She does note that she had a very large meal for dinner last night, however.  She has not had any abdominal pain, abnormal vaginal discharge, vaginal bleeding, fever/chills, urinary symptoms, or diarrhea.  She states that her OB told her to come to the urgent care to get Zofran.      Review of Systems   Constitutional: Negative for chills, fever and malaise/fatigue.   Eyes: Negative for blurred vision.   Respiratory: Negative for shortness of breath.    Cardiovascular: Negative for chest pain and palpitations.   Gastrointestinal: Positive for nausea and vomiting. Negative for abdominal pain and diarrhea.   Genitourinary: Negative for dysuria, flank pain, frequency and urgency.   Neurological: Negative for dizziness.       PMH:  has a past medical history of Family history of diabetes mellitus (DM) (6/2/2016), GBS (group B Streptococcus carrier), +RV culture, currently pregnant (4/20/2015), Late prenatal care-1st visit at 22wks (1/7/2015), Supervision of normal first teen pregnancy (1/7/2015), and Urinary tract infection, site not specified (2014).  MEDS:   Current Outpatient Medications:   •  ondansetron (ZOFRAN) 4 MG Tab tablet, Take 1 tablet by mouth every 6 hours as needed for Nausea/Vomiting., Disp: 20 tablet, Rfl: 0  ALLERGIES:   Allergies   Allergen Reactions   •  "Codeine Swelling     SURGHX: History reviewed. No pertinent surgical history.  SOCHX:  reports that she has never smoked. She has never used smokeless tobacco. She reports previous alcohol use. She reports that she does not use drugs.  FH: Family history was reviewed, no pertinent findings to report     Objective:     /58 (BP Location: Left arm, Patient Position: Sitting, BP Cuff Size: Adult)   Pulse 89   Temp 37 °C (98.6 °F) (Temporal)   Resp 16   Ht 1.613 m (5' 3.5\")   Wt 87.5 kg (193 lb)   SpO2 97%   BMI 33.65 kg/m²      Physical Exam  Constitutional:       Appearance: Normal appearance. She is well-developed.   HENT:      Head: Normocephalic and atraumatic.      Right Ear: External ear normal.      Left Ear: External ear normal.   Eyes:      Conjunctiva/sclera: Conjunctivae normal.      Pupils: Pupils are equal, round, and reactive to light.   Cardiovascular:      Rate and Rhythm: Normal rate and regular rhythm.      Heart sounds: No murmur.   Pulmonary:      Effort: Pulmonary effort is normal.      Breath sounds: Normal breath sounds.   Abdominal:      Palpations: Abdomen is soft.   Skin:     General: Skin is warm and dry.      Capillary Refill: Capillary refill takes less than 2 seconds.   Neurological:      Mental Status: She is alert and oriented to person, place, and time.   Psychiatric:         Behavior: Behavior normal.         Judgment: Judgment normal.         POCT Urinalysis - Negative for infection. No protein     Assessment/Plan:     1. Nausea and vomiting during pregnancy  - ondansetron (ZOFRAN) 4 MG Tab tablet; Take 1 tablet by mouth every 6 hours as needed for Nausea/Vomiting.  Dispense: 20 tablet; Refill: 0  - POCT Urinalysis    2. 30 weeks gestation of pregnancy  - POCT Urinalysis      Patient with few days of nausea and one episode of vomiting.  She was prescribed Zofran per her OBs request.  Also discussed that she should try to eat smaller more frequent meals throughout the day " rather than larger meals and she can also try sipping on alexys tea or peppermint throat lozenges to help with her symptoms.  She is to drink plenty of fluids.  She should follow-up with her OB in the next few days for reevaluation of her symptoms.            Differential Diagnosis, natural history, and supportive care discussed. Return to the Urgent Care or follow up with your PCP if symptoms fail to resolve, or for any new or worsening symptoms. Emergency room precautions discussed. Patient and/or family appears understanding of information.

## 2021-04-24 ENCOUNTER — HOSPITAL ENCOUNTER (OUTPATIENT)
Facility: MEDICAL CENTER | Age: 26
End: 2021-04-24
Attending: PHYSICIAN ASSISTANT
Payer: COMMERCIAL

## 2021-04-24 ENCOUNTER — OFFICE VISIT (OUTPATIENT)
Dept: URGENT CARE | Facility: PHYSICIAN GROUP | Age: 26
End: 2021-04-24
Payer: COMMERCIAL

## 2021-04-24 VITALS
HEIGHT: 63 IN | SYSTOLIC BLOOD PRESSURE: 112 MMHG | OXYGEN SATURATION: 97 % | TEMPERATURE: 98.2 F | DIASTOLIC BLOOD PRESSURE: 60 MMHG | HEART RATE: 86 BPM | RESPIRATION RATE: 14 BRPM | WEIGHT: 191 LBS | BODY MASS INDEX: 33.84 KG/M2

## 2021-04-24 DIAGNOSIS — J06.9 VIRAL URI: ICD-10-CM

## 2021-04-24 DIAGNOSIS — J30.9 ALLERGIC RHINITIS, UNSPECIFIED SEASONALITY, UNSPECIFIED TRIGGER: ICD-10-CM

## 2021-04-24 DIAGNOSIS — J02.9 PHARYNGITIS, UNSPECIFIED ETIOLOGY: ICD-10-CM

## 2021-04-24 LAB
INT CON NEG: NORMAL
INT CON POS: NORMAL
S PYO AG THROAT QL: NORMAL

## 2021-04-24 PROCEDURE — U0003 INFECTIOUS AGENT DETECTION BY NUCLEIC ACID (DNA OR RNA); SEVERE ACUTE RESPIRATORY SYNDROME CORONAVIRUS 2 (SARS-COV-2) (CORONAVIRUS DISEASE [COVID-19]), AMPLIFIED PROBE TECHNIQUE, MAKING USE OF HIGH THROUGHPUT TECHNOLOGIES AS DESCRIBED BY CMS-2020-01-R: HCPCS

## 2021-04-24 PROCEDURE — U0005 INFEC AGEN DETEC AMPLI PROBE: HCPCS

## 2021-04-24 PROCEDURE — 99213 OFFICE O/P EST LOW 20 MIN: CPT | Performed by: PHYSICIAN ASSISTANT

## 2021-04-24 PROCEDURE — 87880 STREP A ASSAY W/OPTIC: CPT | Performed by: PHYSICIAN ASSISTANT

## 2021-04-24 ASSESSMENT — ENCOUNTER SYMPTOMS
SWOLLEN GLANDS: 0
EYE PAIN: 0
COUGH: 0
HEADACHES: 1
BLURRED VISION: 0
FEVER: 0
DIARRHEA: 0
SHORTNESS OF BREATH: 0
RHINORRHEA: 0
DIZZINESS: 0
CHILLS: 0
SORE THROAT: 1
SINUS PAIN: 1
ABDOMINAL PAIN: 0
VOMITING: 0
NAUSEA: 0
MYALGIAS: 0
PHOTOPHOBIA: 1
PALPITATIONS: 0

## 2021-04-24 ASSESSMENT — FIBROSIS 4 INDEX: FIB4 SCORE: 0.86

## 2021-04-24 NOTE — LETTER
April 24, 2021         Patient: Barbara David   YOB: 1995   Date of Visit: 4/24/2021           To Whom it May Concern:    Barbara David was seen in my clinic on 4/24/2021.    If you have any questions or concerns, please don't hesitate to call.        Sincerely,           Micaela Rosas P.A.-C.  Electronically Signed

## 2021-04-24 NOTE — PROGRESS NOTES
Subjective:      Barbara David is a 26 y.o. female who presents with Headache (eye pain/ sensitive to light, congestion, sore throat)      URI   This is a new problem. The current episode started in the past 7 days (started 3 days ago). The problem has been unchanged. There has been no fever. Associated symptoms include congestion, headaches, sinus pain and a sore throat (only in the mornings). Pertinent negatives include no abdominal pain, chest pain, coughing, diarrhea, dysuria, ear pain, nausea, plugged ear sensation, rash, rhinorrhea, swollen glands or vomiting. She has tried acetaminophen for the symptoms. The treatment provided no relief.   Patient also notes that she has noticed itchy/watery eyes over the past few days.  Of note, patient is 31 weeks pregnant.    Review of Systems   Constitutional: Positive for malaise/fatigue. Negative for chills and fever.   HENT: Positive for congestion, sinus pain and sore throat (only in the mornings). Negative for ear pain and rhinorrhea.    Eyes: Positive for photophobia. Negative for blurred vision and pain.   Respiratory: Negative for cough and shortness of breath.    Cardiovascular: Negative for chest pain and palpitations.   Gastrointestinal: Negative for abdominal pain, diarrhea, nausea and vomiting.   Genitourinary: Negative for dysuria.   Musculoskeletal: Negative for myalgias.   Skin: Negative for rash.   Neurological: Positive for headaches. Negative for dizziness.       PMH:  has a past medical history of Family history of diabetes mellitus (DM) (6/2/2016), GBS (group B Streptococcus carrier), +RV culture, currently pregnant (4/20/2015), Late prenatal care-1st visit at 22wks (1/7/2015), Supervision of normal first teen pregnancy (1/7/2015), and Urinary tract infection, site not specified (2014).  MEDS:   Current Outpatient Medications:   •  ondansetron (ZOFRAN) 4 MG Tab tablet, Take 1 tablet by mouth every 6 hours as needed for  "Nausea/Vomiting., Disp: 20 tablet, Rfl: 0  ALLERGIES:   Allergies   Allergen Reactions   • Codeine Swelling     SURGHX: History reviewed. No pertinent surgical history.  SOCHX:  reports that she has never smoked. She has never used smokeless tobacco. She reports previous alcohol use. She reports that she does not use drugs.  FH: Family history was reviewed, no pertinent findings to report     Objective:     /60 (BP Location: Right arm, Patient Position: Sitting, BP Cuff Size: Adult)   Pulse 86   Temp 36.8 °C (98.2 °F) (Temporal)   Resp 14   Ht 1.6 m (5' 3\")   Wt 86.6 kg (191 lb)   SpO2 97%   BMI 33.83 kg/m²      Physical Exam  Constitutional:       Appearance: She is well-developed.   HENT:      Head: Normocephalic and atraumatic.      Right Ear: Tympanic membrane, ear canal and external ear normal.      Left Ear: Tympanic membrane, ear canal and external ear normal.      Nose: Mucosal edema, congestion and rhinorrhea present.      Right Turbinates: Swollen.      Left Turbinates: Swollen.      Right Sinus: No maxillary sinus tenderness or frontal sinus tenderness.      Left Sinus: No maxillary sinus tenderness or frontal sinus tenderness.      Mouth/Throat:      Lips: Pink.      Mouth: Mucous membranes are moist.      Pharynx: Oropharynx is clear.   Eyes:      Conjunctiva/sclera: Conjunctivae normal.      Pupils: Pupils are equal, round, and reactive to light.   Cardiovascular:      Rate and Rhythm: Normal rate and regular rhythm.      Heart sounds: Normal heart sounds. No murmur.   Pulmonary:      Effort: Pulmonary effort is normal.      Breath sounds: Normal breath sounds. No wheezing.   Musculoskeletal:      Cervical back: Normal range of motion.   Lymphadenopathy:      Cervical: No cervical adenopathy.   Skin:     General: Skin is warm and dry.      Capillary Refill: Capillary refill takes less than 2 seconds.   Neurological:      Mental Status: She is alert and oriented to person, place, and time. "   Psychiatric:         Behavior: Behavior normal.         Judgment: Judgment normal.       POCT Rapid Strep A - Negative       Assessment/Plan:     1. Viral URI  - COVID/SARS CoV-2 PCR; Future    2. Allergic rhinitis, unspecified seasonality, unspecified trigger    3. Pharyngitis, unspecified etiology  - POCT Rapid Strep A      Rapid strep is negative.  Most of the symptoms seem consistent with allergic rhinitis and seasonal allergies but there could be a viral component to her symptoms as well.  Patient did have COVID-19 virus at the beginning of January but she is not yet vaccinated for COVID-19.  We will test for this to ruled out as a cause of her symptoms.  With the pregnancy there are limited medications that she can safely take.  She could try using an OTC antihistamine such as Zyrtec or Claritin.  Also recommend the use of saline nasal spray, steam inhalation, and continued use of a coolmist humidifier in the bedroom at night.  For the itchy/watery eyes she can use artificial tears and apply cool compresses over the areas to help.

## 2021-04-25 LAB
COVID ORDER STATUS COVID19: NORMAL
SARS-COV-2 RNA RESP QL NAA+PROBE: NOTDETECTED
SPECIMEN SOURCE: NORMAL

## 2021-04-28 ENCOUNTER — HOSPITAL ENCOUNTER (EMERGENCY)
Facility: MEDICAL CENTER | Age: 26
End: 2021-04-28
Attending: OBSTETRICS & GYNECOLOGY | Admitting: OBSTETRICS & GYNECOLOGY
Payer: COMMERCIAL

## 2021-04-28 VITALS
RESPIRATION RATE: 18 BRPM | DIASTOLIC BLOOD PRESSURE: 61 MMHG | WEIGHT: 190 LBS | TEMPERATURE: 97 F | SYSTOLIC BLOOD PRESSURE: 114 MMHG | BODY MASS INDEX: 32.44 KG/M2 | HEART RATE: 86 BPM | OXYGEN SATURATION: 96 % | HEIGHT: 64 IN

## 2021-04-28 PROCEDURE — 302449 STATCHG TRIAGE ONLY (STATISTIC)

## 2021-04-28 PROCEDURE — 59025 FETAL NON-STRESS TEST: CPT

## 2021-04-28 ASSESSMENT — PAIN SCALES - GENERAL: PAINLEVEL: 0 - NO PAIN

## 2021-04-28 ASSESSMENT — FIBROSIS 4 INDEX: FIB4 SCORE: 0.86

## 2021-04-28 NOTE — PROGRESS NOTES
Presents at 33.1 wk for decreased FM. Denies UCs, LOF, or VB. Reactive tracing obtained; pt reports good FM now. Report given to Dr. Casillas; d/c orders obtained. PTL precautions provided; encouraged to return if decreased FM occurs again

## 2021-05-20 ENCOUNTER — OFFICE VISIT (OUTPATIENT)
Dept: URGENT CARE | Facility: PHYSICIAN GROUP | Age: 26
End: 2021-05-20
Payer: COMMERCIAL

## 2021-05-20 VITALS
BODY MASS INDEX: 34.73 KG/M2 | OXYGEN SATURATION: 98 % | HEART RATE: 80 BPM | SYSTOLIC BLOOD PRESSURE: 110 MMHG | WEIGHT: 196 LBS | RESPIRATION RATE: 16 BRPM | DIASTOLIC BLOOD PRESSURE: 80 MMHG | TEMPERATURE: 97.3 F | HEIGHT: 63 IN

## 2021-05-20 DIAGNOSIS — R09.81 NASAL SINUS CONGESTION: ICD-10-CM

## 2021-05-20 DIAGNOSIS — G44.209 ACUTE NON INTRACTABLE TENSION-TYPE HEADACHE: ICD-10-CM

## 2021-05-20 DIAGNOSIS — Z88.9 H/O SEASONAL ALLERGIES: ICD-10-CM

## 2021-05-20 PROCEDURE — 99213 OFFICE O/P EST LOW 20 MIN: CPT | Performed by: NURSE PRACTITIONER

## 2021-05-20 RX ORDER — FEXOFENADINE HCL 60 MG/1
60 TABLET, FILM COATED ORAL DAILY
COMMUNITY
End: 2021-09-11

## 2021-05-20 ASSESSMENT — ENCOUNTER SYMPTOMS
COUGH: 0
WEAKNESS: 0
CONSTIPATION: 0
SHORTNESS OF BREATH: 0
SORE THROAT: 1
ABDOMINAL PAIN: 0
FEVER: 0
WHEEZING: 0
HEADACHES: 0
NAUSEA: 0
EYE DISCHARGE: 0
SINUS PAIN: 1
NECK PAIN: 0
DIARRHEA: 0
MYALGIAS: 0
CHILLS: 0
DIZZINESS: 0
VOMITING: 0
EYE REDNESS: 0

## 2021-05-20 ASSESSMENT — FIBROSIS 4 INDEX: FIB4 SCORE: 0.86

## 2021-05-20 NOTE — PROGRESS NOTES
Subjective:      Barbara David is a 26 y.o. female who presents with Sinus Problem (Pt reports migraines. Onset 4 days. )            HPI  Experiencing strong headache to left side of head x 4 days. Experiencing increased nasal congestion due to seasonal allergies. Bilat ear fullness, L> R. Denies fever, cough, body aches or malaise. Denies any known exposure to others with illness or COVID. Using Tylenol every 6 hrs as needed for headache but not helping. Denies nausea/vomiting or sore throat. Taking Allegra. In last trimester of pregnancy, due 6/2021. Not concerned for COVID.     PMH:  has a past medical history of Family history of diabetes mellitus (DM) (6/2/2016), GBS (group B Streptococcus carrier), +RV culture, currently pregnant (4/20/2015), Late prenatal care-1st visit at 22wks (1/7/2015), Supervision of normal first teen pregnancy (1/7/2015), and Urinary tract infection, site not specified (2014).  MEDS:   Current Outpatient Medications:   •  fexofenadine (ALLEGRA ALLERGY) 60 MG Tab, Take 60 mg by mouth every day., Disp: , Rfl:   •  ondansetron (ZOFRAN) 4 MG Tab tablet, Take 1 tablet by mouth every 6 hours as needed for Nausea/Vomiting. (Patient not taking: Reported on 5/20/2021), Disp: 20 tablet, Rfl: 0  ALLERGIES:   Allergies   Allergen Reactions   • Codeine Swelling     SURGHX: History reviewed. No pertinent surgical history.  SOCHX:  reports that she has never smoked. She has never used smokeless tobacco. She reports previous alcohol use. She reports that she does not use drugs.  FH: Family history was reviewed, no pertinent findings to report    Review of Systems   Constitutional: Negative for chills, fever and malaise/fatigue.   HENT: Positive for congestion, ear pain, sinus pain and sore throat. Negative for ear discharge, hearing loss and tinnitus.    Eyes: Negative for discharge and redness.   Respiratory: Negative for cough, shortness of breath and wheezing.    Gastrointestinal:  "Negative for abdominal pain, constipation, diarrhea, nausea and vomiting.   Musculoskeletal: Negative for myalgias and neck pain.   Skin: Negative for itching and rash.   Neurological: Negative for dizziness, weakness and headaches.   Endo/Heme/Allergies: Positive for environmental allergies.   All other systems reviewed and are negative.         Objective:     /80 (BP Location: Left arm, Patient Position: Sitting, BP Cuff Size: Adult)   Pulse 80   Temp 36.3 °C (97.3 °F) (Temporal)   Resp 16   Ht 1.6 m (5' 3\")   Wt 88.9 kg (196 lb)   SpO2 98%   Breastfeeding No   BMI 34.72 kg/m²      Physical Exam  Vitals reviewed.   Constitutional:       General: She is awake. She is not in acute distress.     Appearance: Normal appearance. She is well-developed. She is not ill-appearing, toxic-appearing or diaphoretic.   HENT:      Head: Normocephalic.      Right Ear: Ear canal and external ear normal. A middle ear effusion is present.      Left Ear: Ear canal and external ear normal. A middle ear effusion is present.      Nose: Mucosal edema and congestion present. No nasal tenderness or rhinorrhea.      Left Turbinates: Enlarged.      Mouth/Throat:      Lips: Pink.      Mouth: Mucous membranes are moist.      Pharynx: Oropharynx is clear. Uvula midline.   Eyes:      Conjunctiva/sclera: Conjunctivae normal.      Pupils: Pupils are equal, round, and reactive to light.   Cardiovascular:      Rate and Rhythm: Normal rate.   Pulmonary:      Effort: Pulmonary effort is normal.   Musculoskeletal:         General: Normal range of motion.      Cervical back: Normal range of motion and neck supple.   Skin:     General: Skin is warm and dry.   Neurological:      Mental Status: She is alert and oriented to person, place, and time.      GCS: GCS eye subscore is 4. GCS verbal subscore is 5. GCS motor subscore is 6.      Cranial Nerves: Cranial nerves are intact.      Sensory: Sensation is intact.      Motor: Motor function is " intact.      Coordination: Coordination is intact.      Gait: Gait is intact.   Psychiatric:         Attention and Perception: Attention and perception normal.         Mood and Affect: Mood and affect normal.         Speech: Speech normal.         Behavior: Behavior normal. Behavior is cooperative.         Thought Content: Thought content normal.         Cognition and Memory: Cognition and memory normal.         Judgment: Judgment normal.                        Assessment/Plan:        1. Acute non intractable tension-type headache      2. Nasal sinus congestion      3. H/O seasonal allergies  Headache most likely due to nasal congestion, seasonal allergies  Increase water intake  Get rest  May use over the counter Excedrin Tension not Excedrin Migraine med as needed for tension headache  May take longer acting antihistamine for seasonal allergy symptoms as needed, Irma allan  May use saline nasal spray/flonase for nasal congestion as needed daily x 7 days then as needed   May gargle with salt water as needed  for any throat discomfort  Monitor for fever, nasal discharge, cough, shortness of breath and chest pain/tightness- need re-evaluation

## 2021-05-20 NOTE — LETTER
May 20, 2021       Patient: Barbara David   YOB: 1995   Date of Visit: 5/20/2021         To Whom It May Concern:    In my medical opinion, I recommend that Barbara David be excused from work today due to non-contagious respiratory illness.    If you have any questions or concerns, please don't hesitate to call [phone]          Sincerely,          LILY NealRLailaN.  Electronically Signed

## 2021-05-29 ENCOUNTER — HOSPITAL ENCOUNTER (EMERGENCY)
Facility: MEDICAL CENTER | Age: 26
End: 2021-05-29
Attending: OBSTETRICS & GYNECOLOGY | Admitting: OBSTETRICS & GYNECOLOGY
Payer: COMMERCIAL

## 2021-05-29 VITALS
HEIGHT: 64 IN | DIASTOLIC BLOOD PRESSURE: 82 MMHG | WEIGHT: 194 LBS | SYSTOLIC BLOOD PRESSURE: 123 MMHG | HEART RATE: 108 BPM | TEMPERATURE: 97.8 F | BODY MASS INDEX: 33.12 KG/M2

## 2021-05-29 PROCEDURE — 302449 STATCHG TRIAGE ONLY (STATISTIC)

## 2021-05-29 PROCEDURE — 59025 FETAL NON-STRESS TEST: CPT

## 2021-05-29 ASSESSMENT — FIBROSIS 4 INDEX: FIB4 SCORE: 0.86

## 2021-05-29 NOTE — PROGRESS NOTES
EDC- 6/15, EGA- 37.4    1450- Pt arrived to L&D with c/o a burning/painful sensation and white appearance of an abdominal scar from 8/10/20 (ovarian cyst removal).  Pt also c/o pain in her pelvis with ambulation and irregular UC's.  EFM/TOCO applied, VSS.  SVE- 1/thick/-2, no change from SVE yesterday in office.  Denies LOF or VB, reports +FM.  Scar on mid lower abdomen does appear dry and white.    1500- Report to Dr. Casillas via phone.  Orders received to discharge pt home with instruction to put ice on scar as needed and take tylenol as needed for pain.  Reassure pt that it is normal for an abdominal scar to become white and painful with abdominal stretching during pregnancy.  Pt instructed to get plenty of rest/hydration and use a belly band for round ligament pain.  Labor precautions and kick counts discussed, pt verbalizes understanding and will follow up at her appointment on Wednesday.  1518- Pt discharged home ambulatory in stable condition.

## 2021-06-01 LAB — GP B STREP DNA SPEC QL NAA+PROBE: NEGATIVE

## 2021-06-04 ENCOUNTER — HOSPITAL ENCOUNTER (EMERGENCY)
Facility: MEDICAL CENTER | Age: 26
End: 2021-06-04
Attending: OBSTETRICS & GYNECOLOGY | Admitting: OBSTETRICS & GYNECOLOGY
Payer: COMMERCIAL

## 2021-06-04 VITALS
TEMPERATURE: 97.4 F | OXYGEN SATURATION: 98 % | BODY MASS INDEX: 34.38 KG/M2 | SYSTOLIC BLOOD PRESSURE: 114 MMHG | HEART RATE: 106 BPM | RESPIRATION RATE: 16 BRPM | DIASTOLIC BLOOD PRESSURE: 64 MMHG | WEIGHT: 194 LBS | HEIGHT: 63 IN

## 2021-06-04 PROCEDURE — 59025 FETAL NON-STRESS TEST: CPT

## 2021-06-04 PROCEDURE — 302449 STATCHG TRIAGE ONLY (STATISTIC)

## 2021-06-04 ASSESSMENT — FIBROSIS 4 INDEX: FIB4 SCORE: 0.86

## 2021-06-04 NOTE — PROGRESS NOTES
25 y/o  EDC , EGA 38.3, here to LRD1 with c/o UCs and loss of mucous plug. EFM/TOCO applied, pt states positive FM. Denies vaginal LOF or bleeding, reports spotting and mucous while wiping x1 day. POC discussed, questions addressed, will monitor.  1525 - SVE 1-2/50/-2  1534 - Gayla CN call and message left.

## 2021-06-05 NOTE — PROGRESS NOTES
1713- Pt requesting to go home. Pt denies discomfort at this time. Pt reports +FM.   Updated PÉREZ. ELLIE Willard of pt's status, orders to discharge pt home.  1735- Pt provided with written and verbal discharge instructions. Pt ambulated out of hospital with daughter.

## 2021-06-14 ENCOUNTER — HOSPITAL ENCOUNTER (EMERGENCY)
Facility: MEDICAL CENTER | Age: 26
End: 2021-06-14
Attending: OBSTETRICS & GYNECOLOGY | Admitting: OBSTETRICS & GYNECOLOGY
Payer: COMMERCIAL

## 2021-06-14 ENCOUNTER — ANESTHESIA EVENT (OUTPATIENT)
Dept: ANESTHESIOLOGY | Facility: MEDICAL CENTER | Age: 26
End: 2021-06-14
Payer: COMMERCIAL

## 2021-06-14 ENCOUNTER — ANESTHESIA (OUTPATIENT)
Dept: ANESTHESIOLOGY | Facility: MEDICAL CENTER | Age: 26
End: 2021-06-14
Payer: COMMERCIAL

## 2021-06-14 ENCOUNTER — HOSPITAL ENCOUNTER (INPATIENT)
Facility: MEDICAL CENTER | Age: 26
LOS: 1 days | End: 2021-06-15
Attending: OBSTETRICS & GYNECOLOGY | Admitting: SPECIALIST
Payer: COMMERCIAL

## 2021-06-14 VITALS
SYSTOLIC BLOOD PRESSURE: 121 MMHG | TEMPERATURE: 97.9 F | RESPIRATION RATE: 16 BRPM | HEART RATE: 68 BPM | OXYGEN SATURATION: 98 % | DIASTOLIC BLOOD PRESSURE: 70 MMHG

## 2021-06-14 LAB
BASOPHILS # BLD AUTO: 0.2 % (ref 0–1.8)
BASOPHILS # BLD: 0.03 K/UL (ref 0–0.12)
EOSINOPHIL # BLD AUTO: 0.02 K/UL (ref 0–0.51)
EOSINOPHIL NFR BLD: 0.2 % (ref 0–6.9)
ERYTHROCYTE [DISTWIDTH] IN BLOOD BY AUTOMATED COUNT: 43.5 FL (ref 35.9–50)
HCT VFR BLD AUTO: 35.6 % (ref 37–47)
HGB BLD-MCNC: 11.3 G/DL (ref 12–16)
HOLDING TUBE BB 8507: NORMAL
IMM GRANULOCYTES # BLD AUTO: 0.07 K/UL (ref 0–0.11)
IMM GRANULOCYTES NFR BLD AUTO: 0.6 % (ref 0–0.9)
LYMPHOCYTES # BLD AUTO: 1.78 K/UL (ref 1–4.8)
LYMPHOCYTES NFR BLD: 14.4 % (ref 22–41)
MCH RBC QN AUTO: 25.2 PG (ref 27–33)
MCHC RBC AUTO-ENTMCNC: 31.7 G/DL (ref 33.6–35)
MCV RBC AUTO: 79.3 FL (ref 81.4–97.8)
MONOCYTES # BLD AUTO: 0.7 K/UL (ref 0–0.85)
MONOCYTES NFR BLD AUTO: 5.7 % (ref 0–13.4)
NEUTROPHILS # BLD AUTO: 9.72 K/UL (ref 2–7.15)
NEUTROPHILS NFR BLD: 78.9 % (ref 44–72)
NRBC # BLD AUTO: 0 K/UL
NRBC BLD-RTO: 0 /100 WBC
PLATELET # BLD AUTO: 206 K/UL (ref 164–446)
PMV BLD AUTO: 12.1 FL (ref 9–12.9)
RBC # BLD AUTO: 4.49 M/UL (ref 4.2–5.4)
SARS-COV+SARS-COV-2 AG RESP QL IA.RAPID: NOTDETECTED
SARS-COV-2 RNA RESP QL NAA+PROBE: NOTDETECTED
SPECIMEN SOURCE: NORMAL
SPECIMEN SOURCE: NORMAL
WBC # BLD AUTO: 12.3 K/UL (ref 4.8–10.8)

## 2021-06-14 PROCEDURE — 59025 FETAL NON-STRESS TEST: CPT

## 2021-06-14 PROCEDURE — 303615 HCHG EPIDURAL/SPINAL ANESTHESIA FOR LABOR

## 2021-06-14 PROCEDURE — 770002 HCHG ROOM/CARE - OB PRIVATE (112)

## 2021-06-14 PROCEDURE — 302449 STATCHG TRIAGE ONLY (STATISTIC)

## 2021-06-14 PROCEDURE — 700111 HCHG RX REV CODE 636 W/ 250 OVERRIDE (IP): Performed by: ANESTHESIOLOGY

## 2021-06-14 PROCEDURE — 85025 COMPLETE CBC W/AUTO DIFF WBC: CPT

## 2021-06-14 PROCEDURE — 36415 COLL VENOUS BLD VENIPUNCTURE: CPT

## 2021-06-14 PROCEDURE — 700102 HCHG RX REV CODE 250 W/ 637 OVERRIDE(OP): Performed by: SPECIALIST

## 2021-06-14 PROCEDURE — 700111 HCHG RX REV CODE 636 W/ 250 OVERRIDE (IP)

## 2021-06-14 PROCEDURE — 700105 HCHG RX REV CODE 258: Performed by: SPECIALIST

## 2021-06-14 PROCEDURE — U0005 INFEC AGEN DETEC AMPLI PROBE: HCPCS

## 2021-06-14 PROCEDURE — 304965 HCHG RECOVERY SERVICES

## 2021-06-14 PROCEDURE — 87426 SARSCOV CORONAVIRUS AG IA: CPT

## 2021-06-14 PROCEDURE — A9270 NON-COVERED ITEM OR SERVICE: HCPCS | Performed by: SPECIALIST

## 2021-06-14 PROCEDURE — U0003 INFECTIOUS AGENT DETECTION BY NUCLEIC ACID (DNA OR RNA); SEVERE ACUTE RESPIRATORY SYNDROME CORONAVIRUS 2 (SARS-COV-2) (CORONAVIRUS DISEASE [COVID-19]), AMPLIFIED PROBE TECHNIQUE, MAKING USE OF HIGH THROUGHPUT TECHNOLOGIES AS DESCRIBED BY CMS-2020-01-R: HCPCS

## 2021-06-14 PROCEDURE — 700105 HCHG RX REV CODE 258

## 2021-06-14 PROCEDURE — 700101 HCHG RX REV CODE 250: Performed by: ANESTHESIOLOGY

## 2021-06-14 PROCEDURE — 59409 OBSTETRICAL CARE: CPT

## 2021-06-14 PROCEDURE — 0UQMXZZ REPAIR VULVA, EXTERNAL APPROACH: ICD-10-PCS | Performed by: SPECIALIST

## 2021-06-14 PROCEDURE — 700111 HCHG RX REV CODE 636 W/ 250 OVERRIDE (IP): Performed by: SPECIALIST

## 2021-06-14 RX ORDER — ROPIVACAINE HYDROCHLORIDE 2 MG/ML
INJECTION, SOLUTION EPIDURAL; INFILTRATION
Status: COMPLETED | OUTPATIENT
Start: 2021-06-14 | End: 2021-06-14

## 2021-06-14 RX ORDER — SODIUM CHLORIDE, SODIUM LACTATE, POTASSIUM CHLORIDE, AND CALCIUM CHLORIDE .6; .31; .03; .02 G/100ML; G/100ML; G/100ML; G/100ML
250 INJECTION, SOLUTION INTRAVENOUS PRN
Status: DISCONTINUED | OUTPATIENT
Start: 2021-06-14 | End: 2021-06-14 | Stop reason: HOSPADM

## 2021-06-14 RX ORDER — ROPIVACAINE HYDROCHLORIDE 2 MG/ML
INJECTION, SOLUTION EPIDURAL; INFILTRATION; PERINEURAL CONTINUOUS
Status: DISCONTINUED | OUTPATIENT
Start: 2021-06-14 | End: 2021-06-15 | Stop reason: HOSPADM

## 2021-06-14 RX ORDER — ONDANSETRON 4 MG/1
4 TABLET, ORALLY DISINTEGRATING ORAL EVERY 6 HOURS PRN
Status: DISCONTINUED | OUTPATIENT
Start: 2021-06-14 | End: 2021-06-15 | Stop reason: HOSPADM

## 2021-06-14 RX ORDER — DOCUSATE SODIUM 100 MG/1
100 CAPSULE, LIQUID FILLED ORAL 2 TIMES DAILY PRN
Status: DISCONTINUED | OUTPATIENT
Start: 2021-06-14 | End: 2021-06-15 | Stop reason: HOSPADM

## 2021-06-14 RX ORDER — ONDANSETRON 2 MG/ML
4 INJECTION INTRAMUSCULAR; INTRAVENOUS EVERY 6 HOURS PRN
Status: DISCONTINUED | OUTPATIENT
Start: 2021-06-14 | End: 2021-06-15 | Stop reason: HOSPADM

## 2021-06-14 RX ORDER — LIDOCAINE HYDROCHLORIDE AND EPINEPHRINE 15; 5 MG/ML; UG/ML
INJECTION, SOLUTION EPIDURAL
Status: COMPLETED | OUTPATIENT
Start: 2021-06-14 | End: 2021-06-14

## 2021-06-14 RX ORDER — DEXTROSE, SODIUM CHLORIDE, SODIUM LACTATE, POTASSIUM CHLORIDE, AND CALCIUM CHLORIDE 5; .6; .31; .03; .02 G/100ML; G/100ML; G/100ML; G/100ML; G/100ML
INJECTION, SOLUTION INTRAVENOUS CONTINUOUS
Status: DISCONTINUED | OUTPATIENT
Start: 2021-06-14 | End: 2021-06-15 | Stop reason: HOSPADM

## 2021-06-14 RX ORDER — HYDROCODONE BITARTRATE AND ACETAMINOPHEN 5; 325 MG/1; MG/1
1 TABLET ORAL EVERY 4 HOURS PRN
Status: DISCONTINUED | OUTPATIENT
Start: 2021-06-14 | End: 2021-06-15 | Stop reason: HOSPADM

## 2021-06-14 RX ORDER — BISACODYL 10 MG
10 SUPPOSITORY, RECTAL RECTAL PRN
Status: DISCONTINUED | OUTPATIENT
Start: 2021-06-14 | End: 2021-06-15 | Stop reason: HOSPADM

## 2021-06-14 RX ORDER — VITAMIN A ACETATE, BETA CAROTENE, ASCORBIC ACID, CHOLECALCIFEROL, .ALPHA.-TOCOPHEROL ACETATE, DL-, THIAMINE MONONITRATE, RIBOFLAVIN, NIACINAMIDE, PYRIDOXINE HYDROCHLORIDE, FOLIC ACID, CYANOCOBALAMIN, CALCIUM CARBONATE, FERROUS FUMARATE, ZINC OXIDE, CUPRIC OXIDE 3080; 12; 120; 400; 1; 1.84; 3; 20; 22; 920; 25; 200; 27; 10; 2 [IU]/1; UG/1; MG/1; [IU]/1; MG/1; MG/1; MG/1; MG/1; MG/1; [IU]/1; MG/1; MG/1; MG/1; MG/1; MG/1
1 TABLET, FILM COATED ORAL
Status: DISCONTINUED | OUTPATIENT
Start: 2021-06-15 | End: 2021-06-15 | Stop reason: HOSPADM

## 2021-06-14 RX ORDER — SODIUM CHLORIDE, SODIUM LACTATE, POTASSIUM CHLORIDE, AND CALCIUM CHLORIDE .6; .31; .03; .02 G/100ML; G/100ML; G/100ML; G/100ML
1000 INJECTION, SOLUTION INTRAVENOUS
Status: COMPLETED | OUTPATIENT
Start: 2021-06-14 | End: 2021-06-14

## 2021-06-14 RX ORDER — METHYLERGONOVINE MALEATE 0.2 MG/ML
0.2 INJECTION INTRAVENOUS
Status: DISCONTINUED | OUTPATIENT
Start: 2021-06-14 | End: 2021-06-15 | Stop reason: HOSPADM

## 2021-06-14 RX ORDER — ALUMINA, MAGNESIA, AND SIMETHICONE 2400; 2400; 240 MG/30ML; MG/30ML; MG/30ML
30 SUSPENSION ORAL EVERY 6 HOURS PRN
Status: DISCONTINUED | OUTPATIENT
Start: 2021-06-14 | End: 2021-06-14 | Stop reason: HOSPADM

## 2021-06-14 RX ORDER — SODIUM CHLORIDE, SODIUM LACTATE, POTASSIUM CHLORIDE, CALCIUM CHLORIDE 600; 310; 30; 20 MG/100ML; MG/100ML; MG/100ML; MG/100ML
INJECTION, SOLUTION INTRAVENOUS PRN
Status: DISCONTINUED | OUTPATIENT
Start: 2021-06-14 | End: 2021-06-15 | Stop reason: HOSPADM

## 2021-06-14 RX ORDER — MISOPROSTOL 200 UG/1
600 TABLET ORAL
Status: DISCONTINUED | OUTPATIENT
Start: 2021-06-14 | End: 2021-06-15 | Stop reason: HOSPADM

## 2021-06-14 RX ORDER — MISOPROSTOL 200 UG/1
800 TABLET ORAL
Status: DISCONTINUED | OUTPATIENT
Start: 2021-06-14 | End: 2021-06-14 | Stop reason: HOSPADM

## 2021-06-14 RX ORDER — IBUPROFEN 600 MG/1
600 TABLET ORAL EVERY 6 HOURS PRN
Status: DISCONTINUED | OUTPATIENT
Start: 2021-06-14 | End: 2021-06-15 | Stop reason: HOSPADM

## 2021-06-14 RX ORDER — SODIUM CHLORIDE, SODIUM LACTATE, POTASSIUM CHLORIDE, CALCIUM CHLORIDE 600; 310; 30; 20 MG/100ML; MG/100ML; MG/100ML; MG/100ML
INJECTION, SOLUTION INTRAVENOUS CONTINUOUS
Status: ACTIVE | OUTPATIENT
Start: 2021-06-14 | End: 2021-06-14

## 2021-06-14 RX ORDER — ROPIVACAINE HYDROCHLORIDE 2 MG/ML
INJECTION, SOLUTION EPIDURAL; INFILTRATION; PERINEURAL
Status: COMPLETED
Start: 2021-06-14 | End: 2021-06-14

## 2021-06-14 RX ORDER — HYDROCODONE BITARTRATE AND ACETAMINOPHEN 10; 325 MG/1; MG/1
1 TABLET ORAL EVERY 4 HOURS PRN
Status: DISCONTINUED | OUTPATIENT
Start: 2021-06-14 | End: 2021-06-15 | Stop reason: HOSPADM

## 2021-06-14 RX ADMIN — ROPIVACAINE HYDROCHLORIDE: 2 INJECTION, SOLUTION EPIDURAL; INFILTRATION; PERINEURAL at 13:13

## 2021-06-14 RX ADMIN — OXYTOCIN 2 MILLI-UNITS/MIN: 10 INJECTION, SOLUTION INTRAMUSCULAR; INTRAVENOUS at 15:19

## 2021-06-14 RX ADMIN — SODIUM CHLORIDE, POTASSIUM CHLORIDE, SODIUM LACTATE AND CALCIUM CHLORIDE: 600; 310; 30; 20 INJECTION, SOLUTION INTRAVENOUS at 13:21

## 2021-06-14 RX ADMIN — SODIUM CHLORIDE, POTASSIUM CHLORIDE, SODIUM LACTATE AND CALCIUM CHLORIDE: 600; 310; 30; 20 INJECTION, SOLUTION INTRAVENOUS at 16:08

## 2021-06-14 RX ADMIN — OXYTOCIN 125 ML/HR: 10 INJECTION, SOLUTION INTRAMUSCULAR; INTRAVENOUS at 19:22

## 2021-06-14 RX ADMIN — LIDOCAINE HYDROCHLORIDE,EPINEPHRINE BITARTRATE 5 ML: 15; .005 INJECTION, SOLUTION EPIDURAL; INFILTRATION; INTRACAUDAL; PERINEURAL at 12:49

## 2021-06-14 RX ADMIN — ROPIVACAINE HYDROCHLORIDE: 2 INJECTION, SOLUTION EPIDURAL; INFILTRATION at 13:13

## 2021-06-14 RX ADMIN — SODIUM CHLORIDE, POTASSIUM CHLORIDE, SODIUM LACTATE AND CALCIUM CHLORIDE: 600; 310; 30; 20 INJECTION, SOLUTION INTRAVENOUS at 11:51

## 2021-06-14 RX ADMIN — ROPIVACAINE HYDROCHLORIDE 2 ML: 2 INJECTION, SOLUTION EPIDURAL; INFILTRATION at 12:49

## 2021-06-14 RX ADMIN — IBUPROFEN 600 MG: 600 TABLET, FILM COATED ORAL at 21:41

## 2021-06-14 RX ADMIN — FENTANYL CITRATE 100 MCG: 50 INJECTION, SOLUTION INTRAMUSCULAR; INTRAVENOUS at 11:51

## 2021-06-14 ASSESSMENT — PAIN SCALES - GENERAL
PAINLEVEL: 0 - NO PAIN
PAIN_LEVEL: 0
PAINLEVEL: 9

## 2021-06-14 ASSESSMENT — COPD QUESTIONNAIRES
COPD SCREENING SCORE: 0
DO YOU EVER COUGH UP ANY MUCUS OR PHLEGM?: NO/ONLY WITH OCCASIONAL COLDS OR INFECTIONS
IN THE PAST 12 MONTHS DO YOU DO LESS THAN YOU USED TO BECAUSE OF YOUR BREATHING PROBLEMS: DISAGREE/UNSURE
HAVE YOU SMOKED AT LEAST 100 CIGARETTES IN YOUR ENTIRE LIFE: NO/DON'T KNOW
DURING THE PAST 4 WEEKS HOW MUCH DID YOU FEEL SHORT OF BREATH: NONE/LITTLE OF THE TIME

## 2021-06-14 ASSESSMENT — FIBROSIS 4 INDEX: FIB4 SCORE: 0.86

## 2021-06-14 ASSESSMENT — PATIENT HEALTH QUESTIONNAIRE - PHQ9
SUM OF ALL RESPONSES TO PHQ9 QUESTIONS 1 AND 2: 0
2. FEELING DOWN, DEPRESSED, IRRITABLE, OR HOPELESS: NOT AT ALL
1. LITTLE INTEREST OR PLEASURE IN DOING THINGS: NOT AT ALL

## 2021-06-14 ASSESSMENT — LIFESTYLE VARIABLES
EVER_SMOKED: NEVER
ALCOHOL_USE: NO

## 2021-06-14 NOTE — PROGRESS NOTES
26 y.o.  Final EDC from prenatal records  21 EGA 38.6.  Returns to L&D with FOB Felipe and 6 y.o. daughter.     Pt was discharged this morning at 0550 am for irregular contractions and unchanged 2cm cervix.     EFM & [place] applied. VSS.     0950 SVE 4/70/-2/vertex/mid. Dark red blood on glove with exam.   [place] shows UC's q 4 minutes. Palpate firm.     1050 Repeat SVE 5/80/-2/vertex.   1125 REport to Jazmyn Son RN to assume pt care. PT transferred to [facility] 217 for labor.    Purse String (Intermediate) Text: Given the location of the defect and the characteristics of the surrounding skin a purse string intermediate closure was deemed most appropriate.  Undermining was performed circumferentially around the surgical defect.  A purse string suture was then placed and tightened.

## 2021-06-14 NOTE — ANESTHESIA PROCEDURE NOTES
Epidural Block    Date/Time: 6/14/2021 12:49 PM  Performed by: Eloy Magana M.D.  Authorized by: Eloy Magana M.D.     Patient Location:  OB  Start Time:  6/14/2021 12:49 PM  End Time:  6/14/2021 1:40 PM  Reason for Block: labor analgesia    patient identified, IV checked, site marked, risks and benefits discussed, surgical consent, monitors and equipment checked, pre-op evaluation and timeout performed    Patient Position:  Sitting  Prep: ChloraPrep, patient draped and sterile technique    Monitoring:  Blood pressure, continuous pulse oximetry and heart rate  Approach:  Midline  Location:  L3-L4  Injection Technique:  SARABJIT saline  Skin infiltration:  Lidocaine  Strength:  1%  Dose:  10ml  Needle Type:  Tuohy  Needle Gauge:  17 G  Needle Length:  3.5 in  Loss of resistance::  8  Catheter Size:  19 G  Catheter at Skin Depth:  12   25 g yoav to csf.   2 ml .2% rovi

## 2021-06-14 NOTE — CARE PLAN
Problem: Risk for Infection and Impaired Wound Healing  Goal: Patient will remain free from infection  Outcome: Progressing  Pt remains afebrile, no s/s of infection noted .     Problem: Pain  Goal: Patient's pain will be alleviated or reduced to the patient’s comfort goal  Outcome: Progressing  Pt will be getting an epidural for pain management

## 2021-06-14 NOTE — PROGRESS NOTES
Progress Note    Subjective:   Doing well. No issues or concerns. Pain well controlled with the NIC    Objective Data:  Recent Labs     21  1150   WBC 12.3*   RBC 4.49   HEMOGLOBIN 11.3*   HEMATOCRIT 35.6*   MCV 79.3*   MCH 25.2*   MCHC 31.7*   RDW 43.5   PLATELETCT 206   MPV 12.1           Vitals:    21 1439 21 1457 21 1518 21 1519   BP: 109/63 106/64 120/74    Pulse: 64 66 81    Resp:       Temp:    36.5 °C (97.7 °F)   TempSrc:    Temporal   SpO2:       Weight:       Height:         ABdomen: soft gravid non tender  SVE: 7/C/0 AROM clear fluid Vtx  Ext: non tender calves    FHTs: 140s with GBTBV  Shafer: q 2-4 min    No intake or output data in the 24 hours ending 21 1612    Current Facility-Administered Medications   Medication Dose Route Frequency Provider Last Rate Last Admin   • LR infusion   Intravenous Continuous Reece Blevins M.D. 125 mL/hr at 21 1608 New Bag at 21 1608   • D5LR infusion   Intravenous Continuous Reece Blevins M.D.       • fentaNYL (SUBLIMAZE) injection 50 mcg  50 mcg Intravenous Q HOUR PRN Reece Blevins M.D.       • fentaNYL (SUBLIMAZE) injection 100 mcg  100 mcg Intravenous Q HOUR PRN Reece Blevins M.D.   100 mcg at 21 1151   • mag hydrox-al hydrox-simeth (MAALOX PLUS ES or MYLANTA DS) suspension 30 mL  30 mL Oral Q6HRS PRN Reece Blevins M.D.       • miSOPROStol (CYTOTEC) tablet 800 mcg  800 mcg Rectal Once PRN Reece Blevins M.D.       • ropivacaine 0.2 % (NAROPIN) injection   Epidural Continuous Eloy Magana M.D.   New Bag at 21 1313   • ePHEDrine injection 5 mg  5 mg Intravenous Q5 MIN PRN Eloy Magana M.D.        And   • lactated ringers infusion (BOLUS)  250 mL Intravenous PRN Eloy Magana M.D.       • oxytocin (PITOCIN) 20 UNITS/1000ML LR (induction of labor)  0.5-20 dhruv-units/min Intravenous Continuous Reece Blevins M.D. 6 mL/hr at 21 1519 2 dhruv-units/min at 21 1519       A/P 25 yo   at 38 6/7 weeks gestation with overall reassuring fetal status now with NIC S/P AROM with clear fluid on Pitocin augmentation. Will proceed with the present management.

## 2021-06-14 NOTE — PROGRESS NOTES
EDC -  EGA - 38.6    1125 - Report received from April RN. Pt move to [facility] for labor.  1150 IV started, labs drawn  1151 Fentanyl given see MAR, bolus started for epidural  1155 Covid swab obtained  1219 Admission procedures completed  1255 - Dr. Magana at bedside. Time out called at 1255. Epidural placed at this time by Dr Magana. Test dose at 1312 - No reaction detected - VSS. Dermatome level of T8. Epidural infusion settings are : 10mL/hr continuous infusion, 5mL bolus every 15 minutes with a 25mL/hr dose limit.   1330 Decel noted to the 70s, pt repositioned, fluid bolus started, and oxygen placed by mask at 10L/min  1405 - Bose placed and draining to gravity. SVE 5/80/-2. Turned to left side with peanut ball. Category 1 FHT tracing at this time. No complaints at this time.   1503 Dr. Chacon called, notified of pt status, order to start pitocin. MD will be at bedside soon for AROM.  1519 Pitocin started, see MAR  1538 Dr. Blevins at bedside, SVE 7/100/0, AROM clear fluid  1713 RN at bedside, pt repositioned onto right side, decels unresolved SVE 8-9/100/0  1715 Pitocin turned off. Oxygen applied via face mask at 10L/min. Pt repositioned  1717 Charge RN Patria at bedside for assistance. Pt repositioned multiple times, decel unresolved.  1723 Dr. Chacon called to bedside for prolonged decel  1726 Dr. Blevins at bedside, SVE antlip, pt repositioned into stirrups for delivery. Educated on proper pushing technique. RN continuously at bedside evaluating FHT and pushing progress.  1731 Pt started pushing  1733  of viable male infant by Dr. Blevins to maternal abdomen. Apgars 8/8    1736 Placenta delivered intact.  Periurethral laceration noted and repaired at this time.  1750 Fundus firm, lochia Light   Pt ambulated to bathroom in stable condition at this time. Pt able to void. Rylee pads changed.  2100 Pt transferred to  in stable condition via wheel chair with infant in arms. Report given to Shabana OSBORNE.  Infant bands matched x2 cuddles active.

## 2021-06-14 NOTE — DISCHARGE INSTRUCTIONS
General Instructions:  · If you think you are in labor, time contractions (lying on your left side) from the beginning of one contraction to the beginning of the next contraction for at least one hour.  · Increase fluid intake: you should consume 10-12 8 oz glasses of non-caffeinated fluid per day.  · Report any pressure or burning on urination to your physician.  · Monitor fetal movement: If you notice an absence or decrease in fetal movement, drink a large glass of water and rest on your side.  If there is no increase in movement, call your physician or go to the hospital for further evaluation.  · Report any sudden, sharp abdominal pain.  · Report any bleeding.  Spotting or pinkish discharge is normal after vaginal exam.  You may also spot after sexual intercourse.    Labor Instructions (37 - 39 weeks):  Call your physician or return to hospital if:  · You have regular contractions that get progressively closer, longer and stronger.  · Your water breaks (remember time and color).  · You have bleeding like a period.  · Your baby does not move enough to complete the daily kick counts (10 movements in 2 hours)  · Your baby moves much less often than on the days before or you have not felt your baby move all day.      Other Instructions:  Please carefully review your entire AFTER VISIT SUMMARY document for all discharge instructions.Labor Instructions (37 - 39 weeks):  Call your physician or return to hospital if:  · You have regular contractions that get progressively closer, longer and stronger.  · Your water breaks (remember time and color).  · You have bleeding like a period.  · Your baby does not move enough to complete the daily kick counts (10 movements in 2 hours)  · Your baby moves much less often than on the days before or you have not felt your baby move all day.

## 2021-06-14 NOTE — PROGRESS NOTES
0507-Pt came to labor and delivery c/o contractions. Pt is . Pt is A/O x4. SVE revealed 2 cm. SVE on 21 revealed 2 cm. Pt unchanged. Contractions are irregular. Patient denies leaking of fluid. Reactive NST obtained. Dr. Blevins notified. Received orders to discharge pt.    6436- Discharge paper work signed. Pt escorted off of labor and delivery with out complications accompanied with her SO and child.

## 2021-06-14 NOTE — H&P
DATE OF ADMISSION:  2021     REASON FOR ADMISSION:  Active labor.     HISTORY OF PRESENT ILLNESS:  This is a 26-year-old  2, para 1 at 38 and   6/7 weeks' gestation, who presented to labor and delivery with complaints of   frequent regular painful uterine contractions.  She was initially 4 cm and   then changed to 5 cm, 80% and 0 station, bulging bag of water and was   subsequently admitted.  Overall reassuring fetal status is appreciated and we   will proceed forward with admission at this time.     PAST MEDICAL HISTORY:  Noncontributory.     PAST SURGICAL HISTORY:  In , the patient had an ovarian cyst removal.     OBSTETRICAL HISTORY:  The patient did have one previous term normal   spontaneous vaginal delivery.  This is her second pregnancy.     SOCIAL HISTORY:  She denies use of any alcohol, tobacco or recreational drug   use.     MEDICATIONS:  Prenatal vitamins.     ALLERGIES:  No known drug allergies.     PHYSICAL EXAMINATION:  VITAL SIGNS:  She is afebrile, hemodynamically stable.  Current vital signs   can be seen in electronic medical record.  HEART:  Regular rate and rhythm.  CHEST:  Clear to auscultation bilaterally.  ABDOMEN:  Soft, gravid, nontender.  PELVIC:  Sterile vaginal exam as stated above was 4, 70, -2, vertex, mid   presentation.  She did have some bloody show at that time, she was checked   approximately an hour later, was 5, 80%, and -2.  Overall reassuring fetal   status was noted.     LABORATORY DATA:  Prenatal care labs are in the hospital system.     ASSESSMENT AND PLAN:  A 26-year-old  2, para 1 at 38 and 6/7 weeks'   gestation with active labor.  We will plan on proceeding forward with   admission.  The patient is interested in proceeding forward with a continuous   lumbar epidural to optimize pain management.  Overall reassuring fetal status   is appreciated.  Anticipate spontaneous vaginal delivery.        ______________________________  Reece Blevins  MD SPAULDING/SHABANA/TERESA    DD:  06/14/2021 16:12  DT:  06/14/2021 16:22    Job#:  [id number]

## 2021-06-15 VITALS
OXYGEN SATURATION: 98 % | TEMPERATURE: 98.2 F | WEIGHT: 190 LBS | DIASTOLIC BLOOD PRESSURE: 74 MMHG | HEIGHT: 63 IN | BODY MASS INDEX: 33.66 KG/M2 | SYSTOLIC BLOOD PRESSURE: 111 MMHG | HEART RATE: 72 BPM | RESPIRATION RATE: 18 BRPM

## 2021-06-15 LAB
ERYTHROCYTE [DISTWIDTH] IN BLOOD BY AUTOMATED COUNT: 43.6 FL (ref 35.9–50)
HCT VFR BLD AUTO: 33.9 % (ref 37–47)
HGB BLD-MCNC: 10.4 G/DL (ref 12–16)
MCH RBC QN AUTO: 24.8 PG (ref 27–33)
MCHC RBC AUTO-ENTMCNC: 30.7 G/DL (ref 33.6–35)
MCV RBC AUTO: 80.7 FL (ref 81.4–97.8)
PLATELET # BLD AUTO: 182 K/UL (ref 164–446)
PMV BLD AUTO: 12.8 FL (ref 9–12.9)
RBC # BLD AUTO: 4.2 M/UL (ref 4.2–5.4)
WBC # BLD AUTO: 13.5 K/UL (ref 4.8–10.8)

## 2021-06-15 PROCEDURE — 85027 COMPLETE CBC AUTOMATED: CPT

## 2021-06-15 PROCEDURE — 700102 HCHG RX REV CODE 250 W/ 637 OVERRIDE(OP): Performed by: SPECIALIST

## 2021-06-15 PROCEDURE — A9270 NON-COVERED ITEM OR SERVICE: HCPCS | Performed by: SPECIALIST

## 2021-06-15 PROCEDURE — 90471 IMMUNIZATION ADMIN: CPT

## 2021-06-15 PROCEDURE — 36415 COLL VENOUS BLD VENIPUNCTURE: CPT

## 2021-06-15 PROCEDURE — 700111 HCHG RX REV CODE 636 W/ 250 OVERRIDE (IP): Performed by: SPECIALIST

## 2021-06-15 PROCEDURE — 90707 MMR VACCINE SC: CPT | Performed by: SPECIALIST

## 2021-06-15 RX ADMIN — HYDROCODONE BITARTRATE AND ACETAMINOPHEN 1 TABLET: 5; 325 TABLET ORAL at 00:23

## 2021-06-15 RX ADMIN — MEASLES, MUMPS, AND RUBELLA VIRUS VACCINE LIVE 0.5 ML: 1000; 12500; 1000 INJECTION, POWDER, LYOPHILIZED, FOR SUSPENSION SUBCUTANEOUS at 20:59

## 2021-06-15 RX ADMIN — PRENATAL WITH FERROUS FUM AND FOLIC ACID 1 TABLET: 3080; 920; 120; 400; 22; 1.84; 3; 20; 10; 1; 12; 200; 27; 25; 2 TABLET ORAL at 07:29

## 2021-06-15 RX ADMIN — IBUPROFEN 600 MG: 600 TABLET, FILM COATED ORAL at 07:30

## 2021-06-15 RX ADMIN — IBUPROFEN 600 MG: 600 TABLET, FILM COATED ORAL at 16:36

## 2021-06-15 ASSESSMENT — EDINBURGH POSTNATAL DEPRESSION SCALE (EPDS)
I HAVE BEEN SO UNHAPPY THAT I HAVE BEEN CRYING: NO, NEVER
I HAVE LOOKED FORWARD WITH ENJOYMENT TO THINGS: AS MUCH AS I EVER DID
I HAVE BEEN SO UNHAPPY THAT I HAVE HAD DIFFICULTY SLEEPING: NOT AT ALL
I HAVE FELT SAD OR MISERABLE: NOT VERY OFTEN
I HAVE FELT SCARED OR PANICKY FOR NO GOOD REASON: NO, NOT MUCH
THINGS HAVE BEEN GETTING ON TOP OF ME: NO, MOST OF THE TIME I HAVE COPED QUITE WELL
I HAVE BEEN ABLE TO LAUGH AND SEE THE FUNNY SIDE OF THINGS: AS MUCH AS I ALWAYS COULD
THE THOUGHT OF HARMING MYSELF HAS OCCURRED TO ME: NEVER
I HAVE BLAMED MYSELF UNNECESSARILY WHEN THINGS WENT WRONG: NOT VERY OFTEN
I HAVE BEEN ANXIOUS OR WORRIED FOR NO GOOD REASON: HARDLY EVER

## 2021-06-15 NOTE — DISCHARGE SUMMARY
DATE OF ADMISSION:  2021   DATE OF DISCHARGE:  06/15/2021     DISCHARGE DIAGNOSES:  1.  Status post spontaneous vaginal delivery.  2.  Uncomplicated postpartum course.     HISTORY OF PRESENT ILLNESS:  A 26-year-old  2, para 1 at 38-6/7 weeks'   gestation who presented to labor and delivery in active labor with overall   reassuring fetal status.  She was subsequently admitted.     Past medical history and physical exam can be found in dictated history and   physical.     ASSESSMENT AND PLAN:  A 26-year-old  2, para 1 at term with overall   reassuring fetal status, presenting to labor and delivery.  We will plan on   proceeding forward with admission as planned.     HOSPITAL COURSE:  The patient was admitted with the plan to proceed forward   with admission.  She progressed well and was granted continuous lumbar   epidural.  She subsequently underwent a spontaneous vaginal delivery after   receiving continuous lumbar epidural with Apgars of 8 and 8 at 1 and 5 minutes   respectively.  Her postpartum course was unremarkable and on postpartum day   #1, she was ambulating and voiding well, tolerating a regular diet.  Her pain   was well controlled.  She was felt to be appropriate for discharge.     DISCHARGE PLAN:  Follow up in 6 weeks.     DISCHARGE INSTRUCTIONS:  She is to call with any increased temperature greater   than 100.4, increasing vaginal bleeding, abdominal pain unrelieved with any   p.o. pain medication.  Call with any other questions or concerns.     DISCHARGE MEDICATIONS:  Motrin and Norco.        ______________________________  MD CHELLY Lloyd/MANNY/LEORA    DD:  06/15/2021 09:54  DT:  06/15/2021 11:28    Job#:  [id number]

## 2021-06-15 NOTE — LACTATION NOTE
"This note was copied from a baby's chart.  Baby 38.6 weeks, , MOB Hx + breast changes during pregnancy, low milk supply with 1st baby, THC use- baby = Neg. Couplet to be discharged today. Educated on risks of breastfeeding & Marijuana use, \"Marijuana & Your Baby\" information sheet given. MOB has been using pacifier, educated mother on limiting use of pacifier, baby may be missing a feed & breasts will have less stimulation. LC attempted to assist with latch, baby sleepy- no latch. Baby does have a tight frenulum, mother to call nurse for next latch to be assessed.      Teaching on hunger cues, breastfeeding when baby shows cues (no schedule), breastfeed a minimum 8 times in 24 hours no longer than 3-4 hours from last feed, importance of STS, positioning baby at breast nipple to nose & cluster feeding. MOB watching [employer] \"Hand expression\" video, LC provided demo. Encouraged mother to hand express & spoon feed back in addition to breastfeeding 5 times during day until milk supply comes in, spoons provided.     MOB has [employer] Insurance, Information on following-up with [facility] for OP lactation support sheet given.     Breastfeeding plan:  Breastfeed on cue a minimum 8x/24 hours no longer than 4 hours from last feed. Hand express & spoon feed back until milk supply comes in, in addition to breastfeeding.   "

## 2021-06-15 NOTE — ANESTHESIA TIME REPORT
Anesthesia Start and Stop Event Times     Date Time Event    6/14/2021 1248 Ready for Procedure     1248 Anesthesia Start     1733 Anesthesia Stop        Responsible Staff  06/14/21    Name Role Begin End    Eloy Magana M.D. Anesth 1248 1734        Preop Diagnosis (Free Text):  Pre-op Diagnosis             Preop Diagnosis (Codes):    Post op Diagnosis  Term pregnancy      Premium Reason  A. 3PM - 7AM    Comments:

## 2021-06-15 NOTE — L&D DELIVERY NOTE
DATE OF SERVICE:  2021     Briefly, this is a 26-year-old  2, para 1 at 38 and 6/7th weeks'   gestation, presented to labor and delivery in active labor with evidence of   cervical change.  Overall, reassuring fetal status and plan to proceed forward   with admission.  The patient was admitted, did receive a continuous lumbar   epidural to optimize pain management.  She subsequently underwent an   artificial rupture of membranes with clear amniotic fluid appreciated.    Pitocin augmentation had been started and increased per protocol.  She arrived   at an anterior lip and had a near 9-minute deceleration in the fetal heart   rate with a good return to the baseline.  The patient was examined once again,   was found to be complete with an urge to push and over 2 contractions   subsequently underwent a spontaneous vaginal delivery over periurethral   laceration on the left without any nuchal cord with ease delivery of the   shoulders and body.  Cord was clamped and cut.  Infant was handed to waiting   nursing staff.  Apgars were 8 and 8 at 1 and 5 minutes respectively in this   vigorous male infant.  The repair of the left periurethral laceration was done   with single interrupted sutures.  Estimated blood loss for the delivery was   150 mL. The patient tolerated labor, delivery and repair well.        ______________________________  MD CHELLY Lloyd/SRIRAM/DESTINY    DD:  2021 17:56  DT:  2021 18:07    Job#:  [id number]

## 2021-06-15 NOTE — PROGRESS NOTES
Assume care form labor and delivery. Oriente pt to room, call light, emergency light, tv, and bed remote. Assessment complete. Fundus firm, lochia light. A. Plan of care reviewed. Pt verbalized understanding. educated to call if pt has pain.

## 2021-06-15 NOTE — DISCHARGE PLANNING
Discharge Planning Assessment Post Partum    Reason for Referral: THC history  Address: [address] [place] [address]  Type of Living Situation: home  Mom Diagnosis: post partum  Baby Diagnosis:   Primary Language: english    Name of Baby: Christophe Valente  Father of the Baby: Felipe Valente  Involved in baby’s care? 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

## 2021-06-15 NOTE — OR SURGEON
Immediate Delivery Note        Estimated Blood Loss: 150 ccs    Findings:  over susy-urethral laceration without any nuchal cord with easy delivery of the shoulders and body with the placenta delivered spontaneous and intact with 3vc with Apgars of 8/8 at one and five minutes respectively in this vigorous male infant. Repair of the susy urethral laceration with single interrupted sutures.     Complications: None        2021 5:45 PM Reece Blevins M.D.

## 2021-06-15 NOTE — ANESTHESIA POSTPROCEDURE EVALUATION
Patient: Barbara David    Procedure Summary     Date: 06/14/21 Room / Location:     Anesthesia Start: 1248 Anesthesia Stop: 1733    Procedure: Labor Epidural Diagnosis:     Scheduled Providers:  Responsible Provider: Eloy Magana M.D.    Anesthesia Type: epidural ASA Status: 2          Final Anesthesia Type: epidural  Last vitals  BP   Blood Pressure: 123/71    Temp   36.5 °C (97.7 °F)    Pulse   72   Resp   16    SpO2   98 %      Anesthesia Post Evaluation    Patient location during evaluation: PACU  Patient participation: complete - patient participated  Level of consciousness: awake and alert  Pain score: 0    Airway patency: patent  Anesthetic complications: no  Cardiovascular status: hemodynamically stable  Respiratory status: acceptable  Hydration status: euvolemic    PONV: none          No complications documented.

## 2021-06-15 NOTE — CARE PLAN
The patient is Watcher - Medium risk of patient condition declining or worsening    Shift Goals  Clinical Goals:  (enusre fundus firm bleeding light)    Progress made toward(s) clinical / shift goals:  fundal rub q4, educated about what too much bleeding is and when to hit emergency light

## 2021-06-16 NOTE — PROGRESS NOTES
Discharge instructions reviewed, verbalized understanding. Paperwork signed. Pt left facility assisted by staff. Put in car seat by parents, verified by RN.

## 2021-06-16 NOTE — PROGRESS NOTES
0702- Bedside report received from [place].  Assumed care of patient.  0902- Patient assessment done.  Patient stated that she is voiding without difficulty and passing flatus.  Patient denied dizziness and stated that she is walking without difficulty.  Discussed pain management plan.  Patient will call when pain intervention needed.  Reviewed plan of care.  Patient verbalized understanding.  Patient stated desire for discharge home today and was encouraged to read the written patient discharge education/instruction sheet.  1636- Patient stated she read the written patient discharge education/instruction sheet and has no questions.

## 2021-06-16 NOTE — DISCHARGE INSTRUCTIONS
PATIENT DISCHARGE EDUCATION INSTRUCTION SHEET      REASONS TO CALL YOUR OBSTETRICIAN  · Persistent fever, shaking, chills (Temperature higher than 100.4) may indicate you have an infection  · Heavy bleeding: soaking more than 1 pad per hour; Passing clots an egg-sized clot or bigger may mean you have an postpartum hemorrhage  · Foul odor from vagina or bad smelling or discolored discharge or blood  · Breast infection (Mastitis symptoms); breast pain, chills, fever, redness or red streaks, may feel flu like symptoms  · Urinary pain, burning or frequency  · Incision that is not healing, increased redness, swelling, tenderness or pain, or any pus from episiotomy or  site may mean you have an infection  · Redness, swelling, warmth, or painful to touch in the calf area of your leg may mean you have a blood clot  · Severe or intensified depression, thoughts or feelings of wanting to hurt yourself or someone else   · Pain in chest, obstructed breathing or shortness of breath (trouble catching your breath) may mean you are having a postpartum complication. Call your provider immediately   · Headache that does not get better, even after taking medicine, a bad headache with vision changes or pain in the upper right area of your belly may mean you have high blood pressure or post birth preeclampsia. Call your provider immediately    HAND WASHING  All family and friends should wash their hands:  · Before and after holding the baby  · Before feeding the baby  · After using the restroom or changing the baby's diaper    WOUND CARE  Ask your physician for additional care instructions. In general:  Episiotomy/Laceration  · May use susy-spray bottle, witch hazel pads and dermaplast spray for comfort  · Use susy-spray bottle after urinating to cleanse perineal area  · To prevent burning during urination spray susy-water bottle on labial area   · Pat perineal area dry until episiotomy/laceration is healed  · Continue to use  susy-bottle until bleeding stops as needed  · If have a 2nd degree laceration or greater, a Sitz bath can offer relief from soreness, burning, and inflammation   · Sitz Bath   · Sit in 6 inches of warm water and soak laceration as needed until the laceration heals    VAGINAL CARE AND BLEEDING  · Nothing inside vagina for 6 weeks:   · No sexual intercourse, tampons or douching  · Bleeding may continue for 2-4 weeks. Amount and color may vary  · Soaking 1 pad or more in an hour for several hours is considered heavy bleeding  · Passing large egg sized blood clots can be concerning  · If you feel like you have heavy bleeding or are having increasing amount of blood clots call your Obstetrician immediately  · If you begin feeling faint upon standing, feeling sick to your stomach, have clammy skin, a really fast heartbeat, have chills, start feeling confused, dizzy, sleepy or weak, or feeling like you're going to faint call your Obstetrician immediately    HYPERTENSION   Preeclampsia or gestational hypertension are types of high blood pressure that only pregnant women can get. It is important for you to be aware of symptoms to seek early intervention and treatment. If you have any of these symptoms immediately call your Obstetrician    · Vision changes or blurred vision   · Severe headache or pain that is unrelieved with medication and will not go away  · Persistent pain in upper abdomen or shoulder   · Increased swelling of face, feet, or hands  · Difficulty breathing or shortness of breath at rest  · Urinating less than usual    URINATION AND BOWEL MOVEMENTS  · Eating more fiber (bran cereal, fruits, and vegetables) and drinking plenty of fluids will help to avoid constipation  · Urinary frequency and urgency after childbirth is normal  · If you experience any urinary pain, burning or frequency call your provider    BIRTH CONTROL  · It is possible to become pregnant at any time after delivery and while  "breastfeeding  · Plan to discuss a method of birth control with your physician at your post delivery follow up visit    POSTPARTUM BLUES  During the first few days after birth, you may experience a sense of the \"blues\" which may include impatience, irritability or even crying. These feelings come and go quickly. However, as many as 1 in 10 women experience emotional symptoms known as postpartum depression.     POSTPARTUM DEPRESSION  May start as early as the second or third day after delivery or take several weeks or months to develop. Symptoms of \"blues\" are present, but are more intense: Crying spells; loss of appetite; feelings of hopelessness or loss of control; fear of touching the baby; over concern or no concern at all about the baby; little or no concern about your own appearance/caring for yourself; and/or inability to sleep or excessive sleeping. Contact your Obstetrician if you are experiencing any of these symptoms     PREVENTING SHAKEN BABY  If you are angry or stressed, PUT THE BABY IN THE CRIB, step away, take some deep breaths, and wait until you are calm to care for the baby. DO NOT SHAKE THE BABY. You are not alone, call a supporter for help.  · [facility] 24/7 crisis call line (615-565-7909) or (1-757.974.1845)  · You can also text them, text \"ANSWER\" (909714)      "

## 2021-06-16 NOTE — CARE PLAN
The patient is Stable - Low risk of patient condition declining or worsening    Shift Goals  Clinical Goals: Maintain VS WDL, fundus firm, lochia light; Pt will report good pain control    Progress made toward(s) clinical / shift goals:  Vital signs WDL, fundus firm, lochia scant to light; Pt reported good pain control with PO medications given

## 2021-09-11 ENCOUNTER — HOSPITAL ENCOUNTER (OUTPATIENT)
Facility: MEDICAL CENTER | Age: 26
End: 2021-09-11
Attending: FAMILY MEDICINE
Payer: COMMERCIAL

## 2021-09-11 ENCOUNTER — OFFICE VISIT (OUTPATIENT)
Dept: URGENT CARE | Facility: PHYSICIAN GROUP | Age: 26
End: 2021-09-11
Payer: COMMERCIAL

## 2021-09-11 VITALS
TEMPERATURE: 98.6 F | RESPIRATION RATE: 16 BRPM | SYSTOLIC BLOOD PRESSURE: 102 MMHG | HEART RATE: 102 BPM | OXYGEN SATURATION: 99 % | HEIGHT: 63 IN | BODY MASS INDEX: 34.91 KG/M2 | WEIGHT: 197 LBS | DIASTOLIC BLOOD PRESSURE: 76 MMHG

## 2021-09-11 DIAGNOSIS — Z20.822 SUSPECTED COVID-19 VIRUS INFECTION: ICD-10-CM

## 2021-09-11 DIAGNOSIS — Z20.818 EXPOSURE TO STREP THROAT: ICD-10-CM

## 2021-09-11 LAB
INT CON NEG: NORMAL
INT CON POS: NORMAL
S PYO AG THROAT QL: NORMAL

## 2021-09-11 PROCEDURE — 87880 STREP A ASSAY W/OPTIC: CPT | Performed by: FAMILY MEDICINE

## 2021-09-11 PROCEDURE — 99213 OFFICE O/P EST LOW 20 MIN: CPT | Mod: CS | Performed by: FAMILY MEDICINE

## 2021-09-11 PROCEDURE — U0005 INFEC AGEN DETEC AMPLI PROBE: HCPCS

## 2021-09-11 PROCEDURE — U0003 INFECTIOUS AGENT DETECTION BY NUCLEIC ACID (DNA OR RNA); SEVERE ACUTE RESPIRATORY SYNDROME CORONAVIRUS 2 (SARS-COV-2) (CORONAVIRUS DISEASE [COVID-19]), AMPLIFIED PROBE TECHNIQUE, MAKING USE OF HIGH THROUGHPUT TECHNOLOGIES AS DESCRIBED BY CMS-2020-01-R: HCPCS

## 2021-09-11 ASSESSMENT — FIBROSIS 4 INDEX: FIB4 SCORE: 0.82

## 2021-09-11 ASSESSMENT — ENCOUNTER SYMPTOMS
COUGH: 1
VOMITING: 0
FEVER: 0

## 2021-09-11 NOTE — PROGRESS NOTES
"Subjective:     Barbara David is a 26 y.o. female who presents for Cough (Dry cough, runny nose. Onset 3 days. )    HPI  Pt presents for evaluation of an acute problem  Patient with cough and rhinorrhea for the last 3 days  Cough is dry nonproductive  Cough is moderate   Having itching in throat   Sister in law who lives with her is ill with mild symptoms   Multiple other extended family memers have been ill too   Also has exposed to strep recently     Review of Systems   Constitutional: Negative for fever.   HENT: Positive for congestion.    Respiratory: Positive for cough.    Gastrointestinal: Negative for vomiting.   Skin: Negative for rash.       PMH:  has a past medical history of Family history of diabetes mellitus (DM) (6/2/2016), GBS (group B Streptococcus carrier), +RV culture, currently pregnant (4/20/2015), Late prenatal care-1st visit at 22wks (1/7/2015), Supervision of normal first teen pregnancy (1/7/2015), and Urinary tract infection, site not specified (2014).  MEDS: No current outpatient medications on file.  ALLERGIES:   Allergies   Allergen Reactions   • Codeine Swelling     SURGHX:   Past Surgical History:   Procedure Laterality Date   • OVARIAN CYSTECTOMY Right 08/10/2020     SOCHX:  reports that she has never smoked. She has never used smokeless tobacco. She reports previous alcohol use. She reports previous drug use. Drug: Inhaled.     Objective:   /76 (BP Location: Left arm, Patient Position: Sitting, BP Cuff Size: Adult)   Pulse (!) 102   Temp 37 °C (98.6 °F) (Temporal)   Resp 16   Ht 1.6 m (5' 3\")   Wt 89.4 kg (197 lb)   SpO2 99%   BMI 34.90 kg/m²     Physical Exam  Constitutional:       General: She is not in acute distress.     Appearance: She is well-developed. She is not diaphoretic.   HENT:      Head: Normocephalic and atraumatic.      Right Ear: Tympanic membrane, ear canal and external ear normal.      Left Ear: Tympanic membrane, ear canal and external " ear normal.      Nose: Congestion present.      Mouth/Throat:      Mouth: Mucous membranes are moist.      Pharynx: Oropharynx is clear. No oropharyngeal exudate or posterior oropharyngeal erythema.   Neck:      Trachea: No tracheal deviation.   Cardiovascular:      Rate and Rhythm: Normal rate and regular rhythm.   Pulmonary:      Effort: Pulmonary effort is normal. No respiratory distress.      Breath sounds: Normal breath sounds. No wheezing or rales.   Musculoskeletal:      Cervical back: Normal range of motion and neck supple. No tenderness.   Lymphadenopathy:      Cervical: No cervical adenopathy.   Skin:     General: Skin is warm and dry.      Findings: No rash.   Neurological:      Mental Status: She is alert.         Assessment/Plan:   Assessment    1. Suspected COVID-19 virus infection  - SARS-CoV-2 PCR (24 hour In-House): Collect NP swab in [place]; Future    2. Exposure to strep throat  - POCT Rapid Strep A    Rapid strep negative.  Patient with COVID-19 versus viral URI.  Reviewed home isolation protocol, medication use for symptomatic management, and follow-up precautions.  COVID-19 testing sent today and will follow-up test results.

## 2021-09-12 DIAGNOSIS — Z20.822 SUSPECTED COVID-19 VIRUS INFECTION: ICD-10-CM

## 2021-10-03 ENCOUNTER — HOSPITAL ENCOUNTER (EMERGENCY)
Facility: MEDICAL CENTER | Age: 26
End: 2021-10-03
Attending: EMERGENCY MEDICINE
Payer: COMMERCIAL

## 2021-10-03 ENCOUNTER — APPOINTMENT (OUTPATIENT)
Dept: RADIOLOGY | Facility: MEDICAL CENTER | Age: 26
End: 2021-10-03
Attending: EMERGENCY MEDICINE
Payer: COMMERCIAL

## 2021-10-03 VITALS
WEIGHT: 196.21 LBS | OXYGEN SATURATION: 98 % | SYSTOLIC BLOOD PRESSURE: 125 MMHG | HEART RATE: 68 BPM | TEMPERATURE: 97.5 F | BODY MASS INDEX: 34.76 KG/M2 | RESPIRATION RATE: 18 BRPM | DIASTOLIC BLOOD PRESSURE: 73 MMHG

## 2021-10-03 DIAGNOSIS — O03.9 MISCARRIAGE: ICD-10-CM

## 2021-10-03 LAB
ALBUMIN SERPL BCP-MCNC: 4.4 G/DL (ref 3.2–4.9)
ALBUMIN/GLOB SERPL: 1.4 G/DL
ALP SERPL-CCNC: 98 U/L (ref 30–99)
ALT SERPL-CCNC: 8 U/L (ref 2–50)
ANION GAP SERPL CALC-SCNC: 12 MMOL/L (ref 7–16)
APPEARANCE UR: CLEAR
AST SERPL-CCNC: 16 U/L (ref 12–45)
B-HCG SERPL-ACNC: 9482 MIU/ML (ref 0–5)
BACTERIA #/AREA URNS HPF: NEGATIVE /HPF
BASOPHILS # BLD AUTO: 0.2 % (ref 0–1.8)
BASOPHILS # BLD: 0.02 K/UL (ref 0–0.12)
BILIRUB SERPL-MCNC: 0.4 MG/DL (ref 0.1–1.5)
BILIRUB UR QL STRIP.AUTO: NEGATIVE
BUN SERPL-MCNC: 7 MG/DL (ref 8–22)
CALCIUM SERPL-MCNC: 8.8 MG/DL (ref 8.5–10.5)
CHLORIDE SERPL-SCNC: 104 MMOL/L (ref 96–112)
CO2 SERPL-SCNC: 22 MMOL/L (ref 20–33)
COLOR UR: YELLOW
CREAT SERPL-MCNC: 0.66 MG/DL (ref 0.5–1.4)
EOSINOPHIL # BLD AUTO: 0.05 K/UL (ref 0–0.51)
EOSINOPHIL NFR BLD: 0.6 % (ref 0–6.9)
EPI CELLS #/AREA URNS HPF: ABNORMAL /HPF
ERYTHROCYTE [DISTWIDTH] IN BLOOD BY AUTOMATED COUNT: 40.9 FL (ref 35.9–50)
GLOBULIN SER CALC-MCNC: 3.1 G/DL (ref 1.9–3.5)
GLUCOSE SERPL-MCNC: 92 MG/DL (ref 65–99)
GLUCOSE UR STRIP.AUTO-MCNC: NEGATIVE MG/DL
HCT VFR BLD AUTO: 40.5 % (ref 37–47)
HGB BLD-MCNC: 13.1 G/DL (ref 12–16)
HYALINE CASTS #/AREA URNS LPF: ABNORMAL /LPF
IMM GRANULOCYTES # BLD AUTO: 0.03 K/UL (ref 0–0.11)
IMM GRANULOCYTES NFR BLD AUTO: 0.4 % (ref 0–0.9)
KETONES UR STRIP.AUTO-MCNC: NEGATIVE MG/DL
LEUKOCYTE ESTERASE UR QL STRIP.AUTO: NEGATIVE
LYMPHOCYTES # BLD AUTO: 2.16 K/UL (ref 1–4.8)
LYMPHOCYTES NFR BLD: 25.4 % (ref 22–41)
MCH RBC QN AUTO: 26.7 PG (ref 27–33)
MCHC RBC AUTO-ENTMCNC: 32.3 G/DL (ref 33.6–35)
MCV RBC AUTO: 82.5 FL (ref 81.4–97.8)
MICRO URNS: ABNORMAL
MONOCYTES # BLD AUTO: 0.42 K/UL (ref 0–0.85)
MONOCYTES NFR BLD AUTO: 4.9 % (ref 0–13.4)
NEUTROPHILS # BLD AUTO: 5.82 K/UL (ref 2–7.15)
NEUTROPHILS NFR BLD: 68.5 % (ref 44–72)
NITRITE UR QL STRIP.AUTO: NEGATIVE
NRBC # BLD AUTO: 0 K/UL
NRBC BLD-RTO: 0 /100 WBC
NUMBER OF RH DOSES IND 8505RD: NORMAL
PH UR STRIP.AUTO: 6.5 [PH] (ref 5–8)
PLATELET # BLD AUTO: 257 K/UL (ref 164–446)
PMV BLD AUTO: 10.5 FL (ref 9–12.9)
POTASSIUM SERPL-SCNC: 3.9 MMOL/L (ref 3.6–5.5)
PROT SERPL-MCNC: 7.5 G/DL (ref 6–8.2)
PROT UR QL STRIP: NEGATIVE MG/DL
RBC # BLD AUTO: 4.91 M/UL (ref 4.2–5.4)
RBC # URNS HPF: >150 /HPF
RBC UR QL AUTO: ABNORMAL
RH BLD: NORMAL
SODIUM SERPL-SCNC: 138 MMOL/L (ref 135–145)
SP GR UR STRIP.AUTO: 1.02
UROBILINOGEN UR STRIP.AUTO-MCNC: 0.2 MG/DL
WBC # BLD AUTO: 8.5 K/UL (ref 4.8–10.8)
WBC #/AREA URNS HPF: ABNORMAL /HPF

## 2021-10-03 PROCEDURE — 76801 OB US < 14 WKS SINGLE FETUS: CPT

## 2021-10-03 PROCEDURE — A9270 NON-COVERED ITEM OR SERVICE: HCPCS | Performed by: EMERGENCY MEDICINE

## 2021-10-03 PROCEDURE — 85025 COMPLETE CBC W/AUTO DIFF WBC: CPT

## 2021-10-03 PROCEDURE — 700102 HCHG RX REV CODE 250 W/ 637 OVERRIDE(OP): Performed by: EMERGENCY MEDICINE

## 2021-10-03 PROCEDURE — 86901 BLOOD TYPING SEROLOGIC RH(D): CPT

## 2021-10-03 PROCEDURE — 84702 CHORIONIC GONADOTROPIN TEST: CPT

## 2021-10-03 PROCEDURE — 81001 URINALYSIS AUTO W/SCOPE: CPT

## 2021-10-03 PROCEDURE — 80053 COMPREHEN METABOLIC PANEL: CPT

## 2021-10-03 PROCEDURE — 99284 EMERGENCY DEPT VISIT MOD MDM: CPT

## 2021-10-03 RX ORDER — OXYCODONE HYDROCHLORIDE AND ACETAMINOPHEN 5; 325 MG/1; MG/1
1 TABLET ORAL ONCE
Status: COMPLETED | OUTPATIENT
Start: 2021-10-03 | End: 2021-10-03

## 2021-10-03 RX ADMIN — OXYCODONE HYDROCHLORIDE AND ACETAMINOPHEN 1 TABLET: 5; 325 TABLET ORAL at 13:47

## 2021-10-03 ASSESSMENT — FIBROSIS 4 INDEX: FIB4 SCORE: 0.82

## 2021-10-03 ASSESSMENT — LIFESTYLE VARIABLES: DO YOU DRINK ALCOHOL: NO

## 2021-10-03 ASSESSMENT — PAIN DESCRIPTION - PAIN TYPE: TYPE: ACUTE PAIN

## 2021-10-03 NOTE — ED PROVIDER NOTES
ED Provider Note    CHIEF COMPLAINT  Chief Complaint   Patient presents with   • Vaginal Bleeding     vaginal bleeding x 3 days with increased clots today pt states she is approx 9 wks pregnant    • Pregnancy       HPI  Barbara David is a 26 y.o. female who presents to the emergency department with complaint of vaginal bleeding. She states that her last menstrual cycle was on July 14, she saw her OB gynecologist after positive pregnancy test and that she had a gestational sac but no evidence of fetal pole at that point. The patient states that she has had increasing bleeding and abdominal discomfort uterine cramping for the last 24 hours. This morning while she was here in the emergency department, she states she started passing large clots as well as tissue. She is a G3, P2 female and her last delivery actually was in the beginning of June. The patient is unsure of her Rh status. She complains of slight uterine pain, denies nausea, vomiting, fever, shakes, chills, sweats.    REVIEW OF SYSTEMS  Positives as above. Pertinent negatives include fever, shakes, chills, sweats, nausea, vomiting  All other 10 review of systems are negative    PAST MEDICAL HISTORY  Past Medical History:   Diagnosis Date   • Family history of diabetes mellitus (DM) 6/2/2016   • GBS (group B Streptococcus carrier), +RV culture, currently pregnant 4/20/2015   • Late prenatal care-1st visit at 22wks 1/7/2015   • Supervision of normal first teen pregnancy 1/7/2015   • Urinary tract infection, site not specified 2014       FAMILY HISTORY  Noncontributory    SOCIAL HISTORY  Social History     Socioeconomic History   • Marital status: Single     Spouse name: Not on file   • Number of children: Not on file   • Years of education: Not on file   • Highest education level: Not on file   Occupational History   • Not on file   Tobacco Use   • Smoking status: Never Smoker   • Smokeless tobacco: Never Used   Vaping Use   • Vaping Use:  Former   • Quit date: 5/20/2011   Substance and Sexual Activity   • Alcohol use: Not Currently   • Drug use: Not Currently     Types: Inhaled     Comment: THC last 8/2020   • Sexual activity: Yes     Partners: Male     Comment: none   Other Topics Concern   • Not on file   Social History Narrative   • Not on file     Social Determinants of Health     Financial Resource Strain:    • Difficulty of Paying Living Expenses:    Food Insecurity:    • Worried About Running Out of Food in the Last Year:    • Ran Out of Food in the Last Year:    Transportation Needs:    • Lack of Transportation (Medical):    • Lack of Transportation (Non-Medical):    Physical Activity:    • Days of Exercise per Week:    • Minutes of Exercise per Session:    Stress:    • Feeling of Stress :    Social Connections:    • Frequency of Communication with Friends and Family:    • Frequency of Social Gatherings with Friends and Family:    • Attends J[employer] Services:    • Active Member of Clubs or Organizations:    • Attends Club or Organization Meetings:    • Marital Status:    Intimate Partner Violence:    • Fear of Current or Ex-Partner:    • Emotionally Abused:    • Physically Abused:    • Sexually Abused:        SURGICAL HISTORY  Past Surgical History:   Procedure Laterality Date   • OVARIAN CYSTECTOMY Right 08/10/2020       CURRENT MEDICATIONS  Home Medications     Reviewed by Margarita Crowell R.N. (Registered Nurse) on 10/03/21 at 1107  Med List Status: Complete   Medication Last Dose Status        Patient Juan Taking any Medications                       ALLERGIES  Allergies   Allergen Reactions   • Codeine Swelling       PHYSICAL EXAM  VITAL SIGNS: /73   Pulse 68   Temp 36.4 °C (97.5 °F) (Temporal)   Resp 18   Wt 89 kg (196 lb 3.4 oz)   SpO2 98%   BMI 34.76 kg/m²      Constitutional: Well developed, Well nourished, No acute distress, Non-toxic appearance.   Eyes: PERRLA, EOMI, Conjunctiva normal, No discharge.   Cardiovascular:  Normal heart rate, Normal rhythm, No murmurs, No rubs, No gallops, and intact distal pulses.   Thorax & Lungs:  No respiratory distress, no rales, no rhonchi, No wheezing, No chest wall tenderness.   Abdomen: Bowel sounds normal, Soft, right suprapubic tenderness, No guarding, No rebound, No pulsatile masses.   Pelvic: The presence of a female nurse  Skin: Warm, Dry, No erythema, No rash.   Extremities: Full range of motion, no deformity, no edema.  Psychiatric: Affect normal for clinical presentation.      LABORATORY/ECG  Results for orders placed or performed during the hospital encounter of 10/03/21   CBC WITH DIFFERENTIAL   Result Value Ref Range    WBC 8.5 4.8 - 10.8 K/uL    RBC 4.91 4.20 - 5.40 M/uL    Hemoglobin 13.1 12.0 - 16.0 g/dL    Hematocrit 40.5 37.0 - 47.0 %    MCV 82.5 81.4 - 97.8 fL    MCH 26.7 (L) 27.0 - 33.0 pg    MCHC 32.3 (L) 33.6 - 35.0 g/dL    RDW 40.9 35.9 - 50.0 fL    Platelet Count 257 164 - 446 K/uL    MPV 10.5 9.0 - 12.9 fL    Neutrophils-Polys 68.50 44.00 - 72.00 %    Lymphocytes 25.40 22.00 - 41.00 %    Monocytes 4.90 0.00 - 13.40 %    Eosinophils 0.60 0.00 - 6.90 %    Basophils 0.20 0.00 - 1.80 %    Immature Granulocytes 0.40 0.00 - 0.90 %    Nucleated RBC 0.00 /100 WBC    Neutrophils (Absolute) 5.82 2.00 - 7.15 K/uL    Lymphs (Absolute) 2.16 1.00 - 4.80 K/uL    Monos (Absolute) 0.42 0.00 - 0.85 K/uL    Eos (Absolute) 0.05 0.00 - 0.51 K/uL    Baso (Absolute) 0.02 0.00 - 0.12 K/uL    Immature Granulocytes (abs) 0.03 0.00 - 0.11 K/uL    NRBC (Absolute) 0.00 K/uL   COMP METABOLIC PANEL   Result Value Ref Range    Sodium 138 135 - 145 mmol/L    Potassium 3.9 3.6 - 5.5 mmol/L    Chloride 104 96 - 112 mmol/L    Co2 22 20 - 33 mmol/L    Anion Gap 12.0 7.0 - 16.0    Glucose 92 65 - 99 mg/dL    Bun 7 (L) 8 - 22 mg/dL    Creatinine 0.66 0.50 - 1.40 mg/dL    Calcium 8.8 8.5 - 10.5 mg/dL    AST(SGOT) 16 12 - 45 U/L    ALT(SGPT) 8 2 - 50 U/L    Alkaline Phosphatase 98 30 - 99 U/L    Total Bilirubin  0.4 0.1 - 1.5 mg/dL    Albumin 4.4 3.2 - 4.9 g/dL    Total Protein 7.5 6.0 - 8.2 g/dL    Globulin 3.1 1.9 - 3.5 g/dL    A-G Ratio 1.4 g/dL   RH TYPE FOR RHOGAM FROM E.D.   Result Value Ref Range    Emergency Department Rh Typing POS     Number Of Rh Doses Indicated ZERO    HCG QUANTITATIVE   Result Value Ref Range    Bhcg 9482.0 (H) 0.0 - 5.0 mIU/mL   URINALYSIS,CULTURE IF INDICATED    Specimen: Urine   Result Value Ref Range    Color Yellow     Character Clear     Specific Gravity 1.016 <1.035    Ph 6.5 5.0 - 8.0    Glucose Negative Negative mg/dL    Ketones Negative Negative mg/dL    Protein Negative Negative mg/dL    Bilirubin Negative Negative    Urobilinogen, Urine 0.2 Negative    Nitrite Negative Negative    Leukocyte Esterase Negative Negative    Occult Blood Large (A) Negative    Micro Urine Req Microscopic    URINE MICROSCOPIC (W/UA)   Result Value Ref Range    WBC 0-2 /hpf    RBC >150 (A) /hpf    Bacteria Negative None /hpf    Epithelial Cells Few /hpf    Hyaline Cast 3-5 (A) /lpf   ESTIMATED GFR   Result Value Ref Range    GFR If African American >60 >60 mL/min/1.73 m 2    GFR If Non African American >60 >60 mL/min/1.73 m 2         RADIOLOGY/PROCEDURES  US-OB 1ST TRIMESTER WITH TRANSVAGINAL (COMBO)   Final Result      No intrauterine pregnancy seen. If the patient does have a positive pregnancy test, differential includes early pregnancy, complete , and ectopic pregnancy. Correlation with quantitative beta-hCG and follow-up ultrasound is recommended.               COURSE & MEDICAL DECISION MAKING  Pertinent Labs & Imaging studies reviewed. (See chart for details)  This is a 26-year-old female who presents with completed .  She did see her OB gynecologist and had evidence of a gestational sac last month.  Here in the emergency department, her beta-hCG is positive of 9482 but is extremely low considering that her last menstrual cycle is in July.  In addition, the patient has an ultrasound is  negative and there is tissue within the cervical os consistent with spontaneous .  The patient is Rh+ therefore she does not require RhoGam.  On reevaluation, she had significant decrease in bleeding, she had no significant abdominal pain, nausea or vomiting.  I do not believe she has a septic , missed  at this point or ectopic pregnancy.  The patient is discharged with strict pelvic rest and to follow-up with a gynecologist for further evaluation and management.  I instructed her to return to this emergency department she has increasing pain, vaginal bleeding as we will need to continue evaluating her for this.        FINAL IMPRESSION     1. Miscarriage Active     DISPOSITION:  Patient will be discharged home in stable condition.    FOLLOW UP:  [facility], Emergency Dept  [address] [place] [id number] [phone]    If symptoms worsen      She will be following up with her gynecologist for further evaluation.       Electronically signed by: Griffin Marquis D.O., 10/3/2021 11:54 AM

## 2021-10-03 NOTE — DISCHARGE INSTRUCTIONS
These return to the emergency room if you have severe vaginal bleeding, severe pain, nausea, vomiting.  Please respect pelvic rest by not having sexual intercourse, placing tampons or douching or anything inside her vagina until you follow-up with her gynecologist.  You still may have slight bleeding and tissue being expressed.  If you have severe bleeding more than 2-3 pads per hour please return to the emerge part for further evaluation and management.  You will need to follow-up with her gynecologist in 1 to 2 weeks.

## 2021-10-03 NOTE — ED TRIAGE NOTES
Pt to triage .  Chief Complaint   Patient presents with   • Vaginal Bleeding     vaginal bleeding x 3 days with increased clots today pt states she is approx 9 wks pregnant    • Pregnancy   pt also states she is post partum 3 months

## 2022-02-09 ENCOUNTER — OFFICE VISIT (OUTPATIENT)
Dept: URGENT CARE | Facility: PHYSICIAN GROUP | Age: 27
End: 2022-02-09
Payer: COMMERCIAL

## 2022-02-09 VITALS
WEIGHT: 207.2 LBS | OXYGEN SATURATION: 98 % | HEART RATE: 84 BPM | TEMPERATURE: 98.8 F | SYSTOLIC BLOOD PRESSURE: 120 MMHG | DIASTOLIC BLOOD PRESSURE: 64 MMHG | HEIGHT: 63 IN | BODY MASS INDEX: 36.71 KG/M2 | RESPIRATION RATE: 16 BRPM

## 2022-02-09 DIAGNOSIS — S39.012A STRAIN OF LUMBAR REGION, INITIAL ENCOUNTER: ICD-10-CM

## 2022-02-09 DIAGNOSIS — Z32.01 POSITIVE PREGNANCY TEST: ICD-10-CM

## 2022-02-09 DIAGNOSIS — M54.41 ACUTE RIGHT-SIDED LOW BACK PAIN WITH RIGHT-SIDED SCIATICA: ICD-10-CM

## 2022-02-09 LAB
APPEARANCE UR: CLEAR
BILIRUB UR STRIP-MCNC: NEGATIVE MG/DL
COLOR UR AUTO: YELLOW
GLUCOSE UR STRIP.AUTO-MCNC: NEGATIVE MG/DL
INT CON NEG: NORMAL
INT CON POS: NORMAL
KETONES UR STRIP.AUTO-MCNC: NEGATIVE MG/DL
LEUKOCYTE ESTERASE UR QL STRIP.AUTO: NEGATIVE
NITRITE UR QL STRIP.AUTO: NEGATIVE
PH UR STRIP.AUTO: 6.5 [PH] (ref 5–8)
POC URINE PREGNANCY TEST: POSITIVE
PROT UR QL STRIP: NEGATIVE MG/DL
RBC UR QL AUTO: NEGATIVE
SP GR UR STRIP.AUTO: 1.01
UROBILINOGEN UR STRIP-MCNC: 0.2 MG/DL

## 2022-02-09 PROCEDURE — 81025 URINE PREGNANCY TEST: CPT | Performed by: PHYSICIAN ASSISTANT

## 2022-02-09 PROCEDURE — 99213 OFFICE O/P EST LOW 20 MIN: CPT | Performed by: PHYSICIAN ASSISTANT

## 2022-02-09 PROCEDURE — 81002 URINALYSIS NONAUTO W/O SCOPE: CPT | Performed by: PHYSICIAN ASSISTANT

## 2022-02-09 ASSESSMENT — ENCOUNTER SYMPTOMS
FEVER: 0
WEAKNESS: 0
EYE DISCHARGE: 0
PERIANAL NUMBNESS: 0
COUGH: 0
SHORTNESS OF BREATH: 0
TINGLING: 1
EYE REDNESS: 0
BOWEL INCONTINENCE: 0
NUMBNESS: 0
PARESTHESIAS: 0
BACK PAIN: 1
LEG PAIN: 1

## 2022-02-09 ASSESSMENT — FIBROSIS 4 INDEX: FIB4 SCORE: 0.59

## 2022-02-09 NOTE — LETTER
February 9, 2022         Patient: Barbara David   YOB: 1995   Date of Visit: 2/9/2022           To Whom it May Concern:    Barbara David was seen in my clinic on 2/9/2022. Please excuse her from work 2/9/2022. She may return to work on 2/10/2022.    If you have any questions or concerns, please don't hesitate to call.        Sincerely,           Gwen Martinez P.A.-C.  Electronically Signed

## 2022-02-09 NOTE — PROGRESS NOTES
Subjective     Barbara David is a 27 y.o. female who presents with Back Pain ((R) lower pain, hurts down onto leg, x1week)            This is a new problem.  The patient presents to clinic complaining of right-sided low back pain x1 week.  The patient reports no recent injury or trauma to her back.    Back Pain  This is a new problem. Episode onset: x 1 week ago. The problem occurs constantly. The problem is unchanged. The pain is present in the lumbar spine (The patient states that the pain is located to her right lower back.). The quality of the pain is described as aching. The pain radiates to the right thigh and right knee (The patient reports intermittent radiation of pain to the posterior aspect of her lower right lower extremity to the level of the right thigh/knee.). The pain is mild. The symptoms are aggravated by position (and movements.). Associated symptoms include leg pain and tingling (The patient reports intermittent tingling of the right lower extremity.). Pertinent negatives include no bladder incontinence, bowel incontinence, chest pain, dysuria, fever, numbness, paresthesias, perianal numbness or weakness. Treatments tried: OTC Tylenol.       PMH:  has a past medical history of Family history of diabetes mellitus (DM) (6/2/2016), GBS (group B Streptococcus carrier), +RV culture, currently pregnant (4/20/2015), Late prenatal care-1st visit at 22wks (1/7/2015), Supervision of normal first teen pregnancy (1/7/2015), and Urinary tract infection, site not specified (2014).  MEDS: No current outpatient medications on file.  ALLERGIES:   Allergies   Allergen Reactions   • Codeine Swelling     SURGHX:   Past Surgical History:   Procedure Laterality Date   • OVARIAN CYSTECTOMY Right 08/10/2020     SOCHX:  reports that she has never smoked. She has never used smokeless tobacco. She reports previous alcohol use. She reports previous drug use. Drug: Inhaled.  FH: Family history was reviewed,  "no pertinent findings to report      Review of Systems   Constitutional: Negative for fever.   HENT: Negative for congestion.    Eyes: Negative for discharge and redness.   Respiratory: Negative for cough and shortness of breath.    Cardiovascular: Negative for chest pain.   Gastrointestinal: Negative for bowel incontinence.   Genitourinary: Negative for bladder incontinence and dysuria.   Musculoskeletal: Positive for back pain.   Neurological: Positive for tingling (The patient reports intermittent tingling of the right lower extremity.). Negative for weakness, numbness and paresthesias.              Objective     /64 (BP Location: Left arm, Patient Position: Sitting, BP Cuff Size: Adult long)   Pulse 84   Temp 37.1 °C (98.8 °F) (Temporal)   Resp 16   Ht 1.6 m (5' 3\")   Wt 94 kg (207 lb 3.2 oz)   SpO2 98%   BMI 36.70 kg/m²      Physical Exam  Constitutional:       General: She is not in acute distress.     Appearance: Normal appearance. She is well-developed. She is not ill-appearing.   HENT:      Head: Normocephalic and atraumatic.      Right Ear: External ear normal.      Left Ear: External ear normal.   Eyes:      Extraocular Movements: Extraocular movements intact.      Conjunctiva/sclera: Conjunctivae normal.   Cardiovascular:      Rate and Rhythm: Normal rate.   Pulmonary:      Effort: Pulmonary effort is normal.   Musculoskeletal:      Cervical back: Normal range of motion and neck supple.      Comments:   Lumbar Spine:  Slight tenderness to the right paraspinal region of the lumbar spine with tenderness extending over the right SI joint.  No midline/bony tenderness.  No palpable step-off.  No swelling.  No ecchymosis.  No overlying erythema.  No increased warmth.  No rashes/lesions.  Decreased ROM -the patient demonstrates limited range of motion secondary pain/soreness  Neurovascular intact distally  Strength 5/5 -equal bilateral lower extremities  Normal gait   Skin:     General: Skin is " warm and dry.   Neurological:      Mental Status: She is alert and oriented to person, place, and time.               Progress:   Results for orders placed or performed in visit on 02/09/22   POCT Urinalysis   Result Value Ref Range    POC Color YELLOW Negative    POC Appearance CLEAR Negative    POC Leukocyte Esterase NEGATIVE Negative    POC Nitrites NEGATIVE Negative    POC Urobiligen 0.2 Negative (0.2) mg/dL    POC Protein NEGATIVE Negative mg/dL    POC Urine PH 6.5 5.0 - 8.0    POC Blood NEGATIVE Negative    POC Specific Gravity 1.015 <1.005 - >1.030    POC Ketones NEGATIVE Negative mg/dL    POC Bilirubin NEGATIVE Negative mg/dL    POC Glucose NEGATIVE Negative mg/dL   POCT Pregnancy   Result Value Ref Range    POC Urine Pregnancy Test POSITIVE Negative    Internal Control Positive Valid     Internal Control Negative Valid                   Assessment & Plan        1. Acute right-sided low back pain with right-sided sciatica  - POCT Urinalysis  - POCT Pregnancy    2. Strain of lumbar region, initial encounter    The patient's presenting symptoms and physical exam endings are consistent with acute right-sided low back pain with associated right-sided sciatica likely secondary to an acute lumbar strain.  On physical exam, the patient had tenderness to the right paraspinal region of the lumbar spine with tenderness extending to the right SI joint.  No midline/bony tenderness was noted.  No focal neurological deficits were appreciated.  The remainder the patient's physical exam today in clinic was normal.  The patient's POCT urinalysis today in clinic showed no signs of infection with negative leukocyte esterase, negative blood, and negative nitrates.  Based on the patient's presenting symptoms and physical exam findings, the patient's low back pain is likely musculoskeletal in nature.  Recommend OTC medications and supportive care for symptomatic management.  Recommend patient follow-up with her PCP as needed for  discussion precautions with patient, and verbalized understanding    Differential diagnoses, supportive care, and indications for immediate follow-up discussed with patient.   Instructed to return to clinic or nearest emergency department for any change in condition, further concerns, or worsening of symptoms.    OTC Tylenol for pain/discomfort  Alternate ice and heat to the affected area for symptomatic relief  Home stretches as discussed in clinic  Follow-up with PCP  Return to clinic or go to the ED if symptoms worsen or fail to improve, or if the patient should develop worsening/increasing back pain, radiation of pain, numbness, tingling, or weakness to the lower extremities, incontinence of bladder/bowel, paresthesias, difficulty walking, fever/chills, and/or any concerning symptoms.     3. Positive pregnancy test  Incidentally -The patient had a positive pregnancy test today in clinic.  The patient states her last menstrual period was approximately 4 weeks ago, which was normal.  The patient states she was not trying to get pregnant, but was also not preventing a possible pregnancy.  Unfortunately, the patient's current pregnancy limits treatment options of her acute low back pain.  Advised the patient to start taking a prenatal vitamin.  Recommend the patient follow-up with her OB.  The patient is currently established with a local OB/GYN.      Discussed plan with the patient, and she agrees to the above.     I personally reviewed prior external notes and test results pertinent to today's visit.  I have independently reviewed and interpreted all diagnostics ordered during this urgent care visit.     Time spent evaluating this patient was at least 30 minutes and includes preparing for visit, obtaining history, exam and evaluation, ordering labs/tests/procedures/medications, independent interpretation, and counseling/education.    Please note that this dictation was created using voice recognition software. I  have made every reasonable attempt to correct obvious errors, but I expect that there may be errors of grammar and possibly content that I did not discover before finalizing the note.     This note was electronically signed by Gwen Martinez PA-C

## 2022-02-14 ENCOUNTER — OFFICE VISIT (OUTPATIENT)
Dept: URGENT CARE | Facility: PHYSICIAN GROUP | Age: 27
End: 2022-02-14
Payer: COMMERCIAL

## 2022-02-14 VITALS
WEIGHT: 200 LBS | BODY MASS INDEX: 34.15 KG/M2 | TEMPERATURE: 98.6 F | OXYGEN SATURATION: 98 % | SYSTOLIC BLOOD PRESSURE: 110 MMHG | DIASTOLIC BLOOD PRESSURE: 80 MMHG | RESPIRATION RATE: 16 BRPM | HEIGHT: 64 IN | HEART RATE: 82 BPM

## 2022-02-14 DIAGNOSIS — M79.661 RIGHT CALF PAIN: ICD-10-CM

## 2022-02-14 DIAGNOSIS — M54.41 ACUTE RIGHT-SIDED LOW BACK PAIN WITH RIGHT-SIDED SCIATICA: ICD-10-CM

## 2022-02-14 PROCEDURE — 99213 OFFICE O/P EST LOW 20 MIN: CPT | Performed by: PHYSICIAN ASSISTANT

## 2022-02-14 ASSESSMENT — ENCOUNTER SYMPTOMS
PARESIS: 0
ABDOMINAL PAIN: 0
BACK PAIN: 1
NUMBNESS: 0
CHILLS: 0
PERIANAL NUMBNESS: 0
FEVER: 0
TINGLING: 0
BOWEL INCONTINENCE: 0
PARESTHESIAS: 0

## 2022-02-14 ASSESSMENT — FIBROSIS 4 INDEX: FIB4 SCORE: 0.59

## 2022-02-14 NOTE — LETTER
February 14, 2022         Patient: Barbara David   YOB: 1995   Date of Visit: 2/14/2022           To Whom it May Concern:    Barbara David was seen in my clinic on 2/14/2022.  Please excuse her absence yesterday and today.  If you have any questions or concerns, please don't hesitate to call.        Sincerely,           Orion Luz P.A.-C.  Electronically Signed

## 2022-02-14 NOTE — PROGRESS NOTES
Subjective:   Barbara David is a 27 y.o. female who presents today with   Chief Complaint   Patient presents with   • Back Pain     x2.5 wks,  Rt. lower back pain, pain travels down on the back of Rt. leg.  Patient was irene on 02/09 for same problem     Back Pain  This is a new problem. The current episode started 1 to 4 weeks ago. The problem occurs constantly. The problem is unchanged. The pain is present in the lumbar spine. The pain radiates to the right knee. The pain is moderate. The symptoms are aggravated by position and sitting (Walking). Pertinent negatives include no abdominal pain, bladder incontinence, bowel incontinence, chest pain, dysuria, fever, numbness, paresis, paresthesias, pelvic pain, perianal numbness or tingling.     Patient was seen 5 days ago and found to have right-sided sciatic pain but was also found to be pregnant at that time.  Patient states she notices pain behind the right knee with certain movements and also has noticed some cramping to the right calf as well.  PMH:  has a past medical history of Family history of diabetes mellitus (DM) (6/2/2016), GBS (group B Streptococcus carrier), +RV culture, currently pregnant (4/20/2015), Late prenatal care-1st visit at 22wks (1/7/2015), Supervision of normal first teen pregnancy (1/7/2015), and Urinary tract infection, site not specified (2014).  MEDS: No current outpatient medications on file.  ALLERGIES:   Allergies   Allergen Reactions   • Codeine Swelling     SURGHX:   Past Surgical History:   Procedure Laterality Date   • OVARIAN CYSTECTOMY Right 08/10/2020     SOCHX:  reports that she has never smoked. She has never used smokeless tobacco. She reports previous alcohol use. She reports previous drug use. Drug: Inhaled.  FH: Reviewed with patient, not pertinent to this visit.     Review of Systems   Constitutional: Negative for chills and fever.   Cardiovascular: Negative for chest pain.   Gastrointestinal: Negative  "for abdominal pain and bowel incontinence.   Genitourinary: Negative for bladder incontinence, dysuria and pelvic pain.   Musculoskeletal: Positive for back pain.   Neurological: Negative for tingling, numbness and paresthesias.      Objective:   /80 (BP Location: Left arm, Patient Position: Sitting, BP Cuff Size: Large adult)   Pulse 82   Temp 37 °C (98.6 °F) (Temporal)   Resp 16   Ht 1.613 m (5' 3.5\")   Wt 90.7 kg (200 lb)   LMP 01/05/2022   SpO2 98%   BMI 34.87 kg/m²   Physical Exam  Vitals and nursing note reviewed.   Constitutional:       General: She is not in acute distress.     Appearance: Normal appearance. She is well-developed. She is not ill-appearing or toxic-appearing.   HENT:      Head: Normocephalic and atraumatic.      Right Ear: Hearing normal.      Left Ear: Hearing normal.   Cardiovascular:      Rate and Rhythm: Normal rate and regular rhythm.      Heart sounds: Normal heart sounds.   Pulmonary:      Effort: Pulmonary effort is normal.      Breath sounds: Normal breath sounds.   Musculoskeletal:      Right lower leg: No edema.      Left lower leg: No edema.        Legs:       Comments: Normal movement in all 4 extremities.  Pain in the right calf with movement of the right lower extremity.  Tenderness to palpation along the lumbar back on the right side down towards the SI joint.  No midline spinous process tenderness or deformity appreciated. Negative SLR.    Skin:     General: Skin is warm and dry.   Neurological:      Mental Status: She is alert.      Coordination: Coordination normal.   Psychiatric:         Mood and Affect: Mood normal.     US    Assessment/Plan:   Assessment    1. Acute right-sided low back pain with right-sided sciatica  - Referral to Physical Therapy    2. Right calf pain  - US-EXTREMITY VENOUS LOWER UNILAT RIGHT; Future  Exercises provided for patient to do at her comfort.  Discussed with patient contraindications for Toradol and steroids with pregnancy.  " Recommend topical icy hot, heat and gentle stretching to the area.  Follow-up with physical therapy as well.  Patient would like to have ultrasound for rule out of blood clot in the right lower extremity.  Differential diagnosis, natural history, supportive care, and indications for immediate follow-up discussed.   Patient given instructions and understanding of medications and treatment.    If not improving in 3-5 days, F/U with PCP or return to UC if symptoms worsen.    Patient agreeable to plan.      Please note that this dictation was created using voice recognition software. I have made every reasonable attempt to correct obvious errors, but I expect that there are errors of grammar and possibly content that I did not discover before finalizing the note.    Orion Luz PA-C

## 2022-02-14 NOTE — PATIENT INSTRUCTIONS
Sciatica Rehab  Ask your health care provider which exercises are safe for you. Do exercises exactly as told by your health care provider and adjust them as directed. It is normal to feel mild stretching, pulling, tightness, or discomfort as you do these exercises. Stop right away if you feel sudden pain or your pain gets worse. Do not begin these exercises until told by your health care provider.  Stretching and range-of-motion exercises  These exercises warm up your muscles and joints and improve the movement and flexibility of your hips and back. These exercises also help to relieve pain, numbness, and tingling.  Sciatic nerve glide  1. Sit in a chair with your head facing down toward your chest. Place your hands behind your back. Let your shoulders slump forward.  2. Slowly straighten one of your legs while you tilt your head back as if you are looking toward the ceiling. Only straighten your leg as far as you can without making your symptoms worse.  3. Hold this position for __________ seconds.  4. Slowly return to the starting position.  5. Repeat with your other leg.  Repeat __________ times. Complete this exercise __________ times a day.  Knee to chest with hip adduction and internal rotation    1. Lie on your back on a firm surface with both legs straight.  2. Bend one of your knees and move it up toward your chest until you feel a gentle stretch in your lower back and buttock. Then, move your knee toward the shoulder that is on the opposite side from your leg. This is hip adduction and internal rotation.  ? Hold your leg in this position by holding on to the front of your knee.  3. Hold this position for __________ seconds.  4. Slowly return to the starting position.  5. Repeat with your other leg.  Repeat __________ times. Complete this exercise __________ times a day.  Prone extension on elbows    1. Lie on your abdomen on a firm surface. A bed may be too soft for this exercise.  2. Prop yourself up on  your elbows.  3. Use your arms to help lift your chest up until you feel a gentle stretch in your abdomen and your lower back.  ? This will place some of your body weight on your elbows. If this is uncomfortable, try stacking pillows under your chest.  ? Your hips should stay down, against the surface that you are lying on. Keep your hip and back muscles relaxed.  4. Hold this position for __________ seconds.  5. Slowly relax your upper body and return to the starting position.  Repeat __________ times. Complete this exercise __________ times a day.  Strengthening exercises  These exercises build strength and endurance in your back. Endurance is the ability to use your muscles for a long time, even after they get tired.  Pelvic tilt  This exercise strengthens the muscles that lie deep in the abdomen.  1. Lie on your back on a firm surface. Bend your knees and keep your feet flat on the floor.  2. Tense your abdominal muscles. Tip your pelvis up toward the ceiling and flatten your lower back into the floor.  ? To help with this exercise, you may place a small towel under your lower back and try to push your back into the towel.  3. Hold this position for __________ seconds.  4. Let your muscles relax completely before you repeat this exercise.  Repeat __________ times. Complete this exercise __________ times a day.  Alternating arm and leg raises    1. Get on your hands and knees on a firm surface. If you are on a hard floor, you may want to use padding, such as an exercise mat, to cushion your knees.  2. Line up your arms and legs. Your hands should be directly below your shoulders, and your knees should be directly below your hips.  3. Lift your left leg behind you. At the same time, raise your right arm and straighten it in front of you.  ? Do not lift your leg higher than your hip.  ? Do not lift your arm higher than your shoulder.  ? Keep your abdominal and back muscles tight.  ? Keep your hips facing the  ground.  ? Do not arch your back.  ? Keep your balance carefully, and do not hold your breath.  4. Hold this position for __________ seconds.  5. Slowly return to the starting position.  6. Repeat with your right leg and your left arm.  Repeat __________ times. Complete this exercise __________ times a day.  Posture and body mechanics  Good posture and healthy body mechanics can help to relieve stress in your body's tissues and joints. Body mechanics refers to the movements and positions of your body while you do your daily activities. Posture is part of body mechanics. Good posture means:  · Your spine is in its natural S-curve position (neutral).  · Your shoulders are pulled back slightly.  · Your head is not tipped forward.  Follow these guidelines to improve your posture and body mechanics in your everyday activities.  Standing    · When standing, keep your spine neutral and your feet about hip width apart. Keep a slight bend in your knees. Your ears, shoulders, and hips should line up.  · When you do a task in which you  one place for a long time, place one foot up on a stable object that is 2-4 inches (5-10 cm) high, such as a footstool. This helps keep your spine neutral.  Sitting    · When sitting, keep your spine neutral and keep your feet flat on the floor. Use a footrest, if necessary, and keep your thighs parallel to the floor. Avoid rounding your shoulders, and avoid tilting your head forward.  · When working at a desk or a computer, keep your desk at a height where your hands are slightly lower than your elbows. Slide your chair under your desk so you are close enough to maintain good posture.  · When working at a computer, place your monitor at a height where you are looking straight ahead and you do not have to tilt your head forward or downward to look at the screen.  Resting  · When lying down and resting, avoid positions that are most painful for you.  · If you have pain with activities  such as sitting, bending, stooping, or squatting, lie in a position in which your body does not bend very much. For example, avoid curling up on your side with your arms and knees near your chest (fetal position).  · If you have pain with activities such as standing for a long time or reaching with your arms, lie with your spine in a neutral position and bend your knees slightly. Try the following positions:  ? Lying on your side with a pillow between your knees.  ? Lying on your back with a pillow under your knees.  Lifting    · When lifting objects, keep your feet at least shoulder width apart and tighten your abdominal muscles.  · Bend your knees and hips and keep your spine neutral. It is important to lift using the strength of your legs, not your back. Do not lock your knees straight out.  · Always ask for help to lift heavy or awkward objects.  This information is not intended to replace advice given to you by your health care provider. Make sure you discuss any questions you have with your health care provider.  Document Released: 12/18/2006 Document Revised: 04/10/2020 Document Reviewed: 01/09/2020  Elsevier Patient Education © 2020 [employer]

## 2022-02-27 ENCOUNTER — OFFICE VISIT (OUTPATIENT)
Dept: URGENT CARE | Facility: PHYSICIAN GROUP | Age: 27
End: 2022-02-27
Payer: COMMERCIAL

## 2022-02-27 VITALS
BODY MASS INDEX: 33.66 KG/M2 | RESPIRATION RATE: 12 BRPM | DIASTOLIC BLOOD PRESSURE: 70 MMHG | HEIGHT: 63 IN | OXYGEN SATURATION: 98 % | TEMPERATURE: 98 F | SYSTOLIC BLOOD PRESSURE: 110 MMHG | WEIGHT: 190 LBS | HEART RATE: 80 BPM

## 2022-02-27 DIAGNOSIS — M26.609 TMJ (TEMPOROMANDIBULAR JOINT SYNDROME): ICD-10-CM

## 2022-02-27 DIAGNOSIS — Z33.1 PREGNANT STATE, INCIDENTAL: ICD-10-CM

## 2022-02-27 PROCEDURE — 99213 OFFICE O/P EST LOW 20 MIN: CPT | Performed by: EMERGENCY MEDICINE

## 2022-02-27 ASSESSMENT — ENCOUNTER SYMPTOMS
VOMITING: 0
NAUSEA: 1
SENSORY CHANGE: 0
DIARRHEA: 0
HEADACHES: 1
ABDOMINAL PAIN: 0
FOCAL WEAKNESS: 0
COUGH: 0
FEVER: 0
RHINORRHEA: 0
SORE THROAT: 0

## 2022-02-27 ASSESSMENT — FIBROSIS 4 INDEX: FIB4 SCORE: 0.59

## 2022-02-27 NOTE — LETTER
[facility] [address] [address]     February 27, 2022    Patient: Barbara David   YOB: 1995   Date of Visit: 2/27/2022       To Whom It May Concern:    Barbara David was unable to attend work February 26-27/2022 for medical reasons.      Sincerely,     Salvador Gardiner M.D.

## 2022-02-27 NOTE — PROGRESS NOTES
"Hortensia David is a 27 y.o. female who presents with Sinusitis (L ear pain x3 days )            Otalgia   There is pain in the left ear. This is a new problem. Episode onset: 3 days. The problem has been gradually worsening. There has been no fever. Associated symptoms include headaches. Pertinent negatives include no abdominal pain, coughing, diarrhea, ear discharge, hearing loss, rash, rhinorrhea, sore throat or vomiting.   Patient notes dental work done a few months ago on the left side.  Onset of left ear discomfort few days ago, associated headache.  No known trauma, no history of migraine.  Last menstrual period first week of January; positive home and confirmatory pregnancy test.    Review of Systems   Constitutional: Negative for fever.   HENT: Positive for ear pain. Negative for congestion, ear discharge, hearing loss, nosebleeds, rhinorrhea and sore throat.    Respiratory: Negative for cough.    Gastrointestinal: Positive for nausea. Negative for abdominal pain, diarrhea and vomiting.   Genitourinary:        No vaginal discharge or bleeding.   Skin: Negative for rash.   Neurological: Positive for headaches. Negative for sensory change and focal weakness.              Objective     /70   Pulse 80   Temp 36.7 °C (98 °F) (Temporal)   Resp 12   Ht 1.6 m (5' 3\")   Wt 86.2 kg (190 lb)   LMP 01/05/2022   SpO2 98%   BMI 33.66 kg/m²      Physical Exam  Constitutional:       General: She is not in acute distress.     Appearance: She is well-developed. She is not ill-appearing.   HENT:      Head: Normocephalic and atraumatic.      Jaw: There is normal jaw occlusion. Tenderness and pain on movement present. No trismus or malocclusion.      Salivary Glands: Left salivary gland is not diffusely enlarged or tender.        Right Ear: Tympanic membrane and ear canal normal.      Left Ear: Hearing, tympanic membrane, ear canal and external ear normal. No mastoid tenderness.      " Nose: No mucosal edema or rhinorrhea.      Mouth/Throat:      Lips: Pink.      Mouth: Mucous membranes are moist. No oral lesions.      Dentition: Normal dentition.      Pharynx: Oropharynx is clear. Uvula midline.   Eyes:      Conjunctiva/sclera: Conjunctivae normal.   Cardiovascular:      Rate and Rhythm: Normal rate and regular rhythm.      Heart sounds: Normal heart sounds.   Pulmonary:      Effort: Pulmonary effort is normal.      Breath sounds: No decreased breath sounds, wheezing, rhonchi or rales.   Musculoskeletal:      Cervical back: Neck supple.   Lymphadenopathy:      Cervical: No cervical adenopathy.   Skin:     General: Skin is warm and dry.      Findings: No erythema or rash.   Neurological:      Mental Status: She is alert.      Cranial Nerves: No facial asymmetry.      Gait: Gait normal.   Psychiatric:         Behavior: Behavior is cooperative.                             Assessment & Plan        1. TMJ (temporomandibular joint syndrome)  Heat, OTC Tylenol as needed.  Night mouth splint as needed  Dental follow-up if persisting.    2. Pregnant state, incidental  Follow-up with obstetrician as planned

## 2022-03-20 ENCOUNTER — OFFICE VISIT (OUTPATIENT)
Dept: URGENT CARE | Facility: PHYSICIAN GROUP | Age: 27
End: 2022-03-20
Payer: COMMERCIAL

## 2022-03-20 ENCOUNTER — HOSPITAL ENCOUNTER (OUTPATIENT)
Facility: MEDICAL CENTER | Age: 27
End: 2022-03-20
Attending: FAMILY MEDICINE
Payer: COMMERCIAL

## 2022-03-20 VITALS
OXYGEN SATURATION: 98 % | BODY MASS INDEX: 34.15 KG/M2 | HEIGHT: 64 IN | TEMPERATURE: 98.3 F | HEART RATE: 80 BPM | SYSTOLIC BLOOD PRESSURE: 112 MMHG | RESPIRATION RATE: 16 BRPM | DIASTOLIC BLOOD PRESSURE: 64 MMHG | WEIGHT: 200 LBS

## 2022-03-20 DIAGNOSIS — J02.9 SORE THROAT: ICD-10-CM

## 2022-03-20 LAB
INT CON NEG: NEGATIVE
INT CON POS: POSITIVE
S PYO AG THROAT QL: NEGATIVE

## 2022-03-20 PROCEDURE — 87880 STREP A ASSAY W/OPTIC: CPT | Performed by: FAMILY MEDICINE

## 2022-03-20 PROCEDURE — 99214 OFFICE O/P EST MOD 30 MIN: CPT | Performed by: FAMILY MEDICINE

## 2022-03-20 PROCEDURE — 0240U HCHG SARS-COV-2 COVID-19 NFCT DS RESP RNA 3 TRGT MIC: CPT

## 2022-03-20 ASSESSMENT — FIBROSIS 4 INDEX: FIB4 SCORE: 0.59

## 2022-03-20 NOTE — LETTER
March 20, 2022         Patient: Barbara David   YOB: 1995   Date of Visit: 3/20/2022           To Whom it May Concern:    Barbara David was seen in my clinic on 3/20/2022.      If you have any questions or concerns, please don't hesitate to call.        Sincerely,           Joe Madrigal M.D.  Electronically Signed

## 2022-03-20 NOTE — PROGRESS NOTES
"CC:  presents with Pharyngitis            Pharyngitis   This is a new problem. The current episode started in the past 3 days. The problem has been unchanged. There has been subj fever. The pain is mild. Associated symptoms include a dry cough. Pertinent negatives include no abdominal pain,   diarrhea, headaches, shortness of breath or vomiting. no exposure to strep or mono.   has tried acetaminophen for the symptoms. The treatment provided mild relief.    She is currently 10 wks pregnant.     Social History     Tobacco Use   • Smoking status: Never Smoker   • Smokeless tobacco: Never Used   Vaping Use   • Vaping Use: Former   • Quit date: 5/20/2011   Substance Use Topics   • Alcohol use: Not Currently   • Drug use: Not Currently     Types: Inhaled     Comment: THC last 8/2020       Past Medical History:   Diagnosis Date   • Family history of diabetes mellitus (DM) 6/2/2016   • GBS (group B Streptococcus carrier), +RV culture, currently pregnant 4/20/2015   • Late prenatal care-1st visit at 22wks 1/7/2015   • Supervision of normal first teen pregnancy 1/7/2015   • Urinary tract infection, site not specified 2014       Review of Systems    HENT: Positive for sore throat  Respiratory: Negative for  sputum production and shortness of breath.    Cardiovascular: Negative for chest pain.   Gastrointestinal: Negative for nausea, vomiting, abdominal pain and diarrhea.   Genitourinary: Negative.    Neurological: Negative for dizziness and headaches.   All other systems reviewed and are negative.         Objective:   /64 (BP Location: Left arm, Patient Position: Sitting, BP Cuff Size: Adult)   Pulse 80   Temp 36.8 °C (98.3 °F) (Temporal)   Resp 16   Ht 1.626 m (5' 4\")   Wt 90.7 kg (200 lb)   SpO2 98%         Physical Exam   Constitutional:   oriented to person, place, and time.  appears well-developed and well-nourished. No distress.   HENT:   Head: Normocephalic and atraumatic.   Right Ear: External ear normal. "   Left Ear: External ear normal.   Nose: Mucosal edema present. Right sinus exhibits no maxillary sinus tenderness and no frontal sinus tenderness. Left sinus exhibits no maxillary sinus tenderness and no frontal sinus tenderness.   Mouth/Throat: no posterior oropharyngeal exudate.   There is posterior oropharyngeal erythema present. No posterior oropharyngeal edema.   Tonsils 1+ bilaterally     Eyes: Conjunctivae and EOM are normal. Pupils are equal, round, and reactive to light. Right eye exhibits no discharge. Left eye exhibits no discharge. No scleral icterus.   Neck: Normal range of motion. Neck supple. No JVD present. No tracheal deviation present. No thyromegaly present.   Cardiovascular: Normal rate, regular rhythm, normal heart sounds and intact distal pulses.  Exam reveals no friction rub.    No murmur heard.  Pulmonary/Chest: Effort normal and breath sounds normal. No respiratory distress.   no wheezes.   no rales.    Musculoskeletal:  exhibits no edema.   Lymphadenopathy:    no cervical LAD  Neurological:   alert and oriented to person, place, and time.   Skin: Skin is warm and dry. No erythema.   Psychiatric:   normal mood and affect.   Nursing note and vitals reviewed.             Assessment/Plan:     1. Sore throat  Pt presents with sore throat and systemic symptoms      Rapid strep   negative.      rx motrin 800mg tid prn      Will send screen for COVID  Home isolation per CDC guidelines     Return to clinic if symptoms worsen

## 2022-03-21 DIAGNOSIS — J02.9 SORE THROAT: ICD-10-CM

## 2022-03-21 LAB
FLUAV RNA SPEC QL NAA+PROBE: NEGATIVE
FLUBV RNA SPEC QL NAA+PROBE: NEGATIVE
SARS-COV-2 RNA RESP QL NAA+PROBE: NOTDETECTED
SPECIMEN SOURCE: NORMAL

## 2022-04-01 ENCOUNTER — OFFICE VISIT (OUTPATIENT)
Dept: URGENT CARE | Facility: PHYSICIAN GROUP | Age: 27
End: 2022-04-01
Payer: COMMERCIAL

## 2022-04-01 VITALS
BODY MASS INDEX: 34.15 KG/M2 | OXYGEN SATURATION: 98 % | HEART RATE: 65 BPM | SYSTOLIC BLOOD PRESSURE: 114 MMHG | DIASTOLIC BLOOD PRESSURE: 78 MMHG | TEMPERATURE: 96.8 F | HEIGHT: 64 IN | WEIGHT: 200 LBS | RESPIRATION RATE: 17 BRPM

## 2022-04-01 DIAGNOSIS — R07.9 CHEST PAIN IN ADULT: ICD-10-CM

## 2022-04-01 DIAGNOSIS — R10.30 LOWER ABDOMINAL PAIN: ICD-10-CM

## 2022-04-01 DIAGNOSIS — Z3A.13 13 WEEKS GESTATION OF PREGNANCY: ICD-10-CM

## 2022-04-01 PROCEDURE — 99213 OFFICE O/P EST LOW 20 MIN: CPT | Performed by: NURSE PRACTITIONER

## 2022-04-01 ASSESSMENT — ENCOUNTER SYMPTOMS: ABDOMINAL PAIN: 1

## 2022-04-01 ASSESSMENT — FIBROSIS 4 INDEX: FIB4 SCORE: 0.59

## 2022-04-01 NOTE — LETTER
April 1, 2022       Patient: Barbara David   YOB: 1995   Date of Visit: 4/1/2022         To Whom It May Concern:    In my medical opinion, I recommend that Barbara David may return to work on 4/4/22.            Sincerely,          Cathey J Hamman, A.P.N.  Electronically Signed

## 2022-04-01 NOTE — PROGRESS NOTES
Subjective     Barbara David is a 27 y.o. female who presents with Abdominal Pain (Abdominal pain x3days, cramping currently pregnant 13 weeks. 4th pregnancy ) and Chest Pain (Pt states could be anxiety related )    Past Medical History:   Diagnosis Date   • Family history of diabetes mellitus (DM) 6/2/2016   • GBS (group B Streptococcus carrier), +RV culture, currently pregnant 4/20/2015   • Late prenatal care-1st visit at 22wks 1/7/2015   • Supervision of normal first teen pregnancy 1/7/2015   • Urinary tract infection, site not specified 2014     Social History     Socioeconomic History   • Marital status: Single     Spouse name: Not on file   • Number of children: Not on file   • Years of education: Not on file   • Highest education level: Not on file   Occupational History   • Not on file   Tobacco Use   • Smoking status: Never Smoker   • Smokeless tobacco: Never Used   Vaping Use   • Vaping Use: Former   • Quit date: 5/20/2011   Substance and Sexual Activity   • Alcohol use: Not Currently   • Drug use: Not Currently     Types: Inhaled     Comment: THC last 8/2020   • Sexual activity: Yes     Partners: Male     Comment: none   Other Topics Concern   • Not on file   Social History Narrative   • Not on file     Social Determinants of Health     Financial Resource Strain: Not on file   Food Insecurity: Not on file   Transportation Needs: Not on file   Physical Activity: Not on file   Stress: Not on file   Social Connections: Not on file   Intimate Partner Violence: Not on file   Housing Stability: Not on file     Family History   Problem Relation Age of Onset   • Heart Disease Maternal Grandfather    • Diabetes Father    • Alcohol/Drug Paternal Grandfather    • Diabetes Paternal Grandfather    • Diabetes Paternal Aunt    • Stroke Neg Hx    • Cancer Neg Hx    • Lung Disease Neg Hx    • Hypertension Neg Hx        Allergies: Codeine    Patient is a 27-year-old female who presents today with complaint  "of anterior chest pain, and lower abdominal pain.  Patient is 13 weeks pregnant.  She did consult her obstetrician earlier today by phone who advised her that they did not have any availability for appointments and referred patient to urgent care for evaluation.  Patient states mild shortness of breath.  Positive nausea consistent with morning sickness.  No vomiting.  This is patient's fourth pregnancy.  States she has never experienced the present symptoms she is having with previous pregnancies.     Patient states overall the symptoms started over the last 7 days.  She reports symptoms were mild initially but over the last 2 days she feels that the chest pressure has been constant.  She states that she feels someone is standing on her chest.  Patient states she is not having any vaginal bleeding        Abdominal Pain    Chest Pain   Associated symptoms include abdominal pain.       Review of Systems   Cardiovascular: Positive for chest pain.   Gastrointestinal: Positive for abdominal pain.              Objective     /78 (BP Location: Right arm, Patient Position: Sitting, BP Cuff Size: Adult)   Pulse 65   Temp 36 °C (96.8 °F) (Tympanic)   Resp 17   Ht 1.626 m (5' 4\")   Wt 90.7 kg (200 lb)   LMP 01/05/2022   SpO2 98%   BMI 34.33 kg/m²      Physical Exam  Vitals reviewed.   Constitutional:       Appearance: Normal appearance.   Eyes:      Extraocular Movements: Extraocular movements intact.      Pupils: Pupils are equal, round, and reactive to light.   Cardiovascular:      Rate and Rhythm: Normal rate and regular rhythm.      Heart sounds: Normal heart sounds.   Pulmonary:      Effort: Pulmonary effort is normal. No respiratory distress.      Breath sounds: Normal breath sounds. No stridor. No wheezing, rhonchi or rales.   Chest:      Chest wall: No tenderness.   Abdominal:      Tenderness: There is no right CVA tenderness, left CVA tenderness, guarding or rebound.      Comments: Mild periumbilical " tenderness, no guarding or rebound tenderness.   Musculoskeletal:         General: Normal range of motion.   Skin:     General: Skin is warm.   Neurological:      Mental Status: She is alert and oriented to person, place, and time.   Psychiatric:         Mood and Affect: Mood normal.         Behavior: Behavior normal.            UA: Negative blood, negative leukocytes, negative nitrates     Concern for chest pain at this time as patient has not experienced this was previous pregnancies.  I am unable to rule out PE at this time in urgent care.  Discussed with patient that symptoms may also be digestive in nature.  Patient endorses feeling anxious as well.  At this time she is referred on to ER for further evaluation and higher level of care.  Patient verbalized understanding and agreement, states she will present to ER for further evaluation at this time.           Assessment & Plan   Abdominal pain  Chest pain     See note above  Patient will present to ER for further evaluation at this time     There are no diagnoses linked to this encounter.

## 2022-04-24 ENCOUNTER — OFFICE VISIT (OUTPATIENT)
Dept: URGENT CARE | Facility: PHYSICIAN GROUP | Age: 27
End: 2022-04-24
Payer: COMMERCIAL

## 2022-04-24 VITALS
BODY MASS INDEX: 32.44 KG/M2 | RESPIRATION RATE: 16 BRPM | OXYGEN SATURATION: 97 % | WEIGHT: 190 LBS | TEMPERATURE: 97.6 F | HEIGHT: 64 IN | HEART RATE: 80 BPM | DIASTOLIC BLOOD PRESSURE: 70 MMHG | SYSTOLIC BLOOD PRESSURE: 112 MMHG

## 2022-04-24 DIAGNOSIS — Z3A.15 PREGNANCY WITH 15 COMPLETED WEEKS GESTATION: ICD-10-CM

## 2022-04-24 DIAGNOSIS — K21.9 GASTROESOPHAGEAL REFLUX DISEASE WITHOUT ESOPHAGITIS: ICD-10-CM

## 2022-04-24 DIAGNOSIS — O21.9 NAUSEA AND VOMITING DURING PREGNANCY: ICD-10-CM

## 2022-04-24 DIAGNOSIS — J45.909 MILD REACTIVE AIRWAYS DISEASE, UNSPECIFIED WHETHER PERSISTENT: ICD-10-CM

## 2022-04-24 PROCEDURE — 99214 OFFICE O/P EST MOD 30 MIN: CPT | Performed by: EMERGENCY MEDICINE

## 2022-04-24 RX ORDER — FAMOTIDINE 20 MG/1
TABLET, FILM COATED ORAL
COMMUNITY
Start: 2022-04-04 | End: 2022-12-13

## 2022-04-24 RX ORDER — ONDANSETRON 4 MG/1
4 TABLET, FILM COATED ORAL EVERY 4 HOURS PRN
COMMUNITY
Start: 2022-04-04 | End: 2022-06-30

## 2022-04-24 RX ORDER — ALBUTEROL SULFATE 90 UG/1
2 AEROSOL, METERED RESPIRATORY (INHALATION) EVERY 6 HOURS PRN
Qty: 8.5 G | Refills: 0 | Status: SHIPPED | OUTPATIENT
Start: 2022-04-24 | End: 2022-12-13

## 2022-04-24 RX ORDER — DOXYLAMINE SUCCINATE AND PYRIDOXINE HYDROCHLORIDE, DELAYED RELEASE TABLETS 10 MG/10 MG 10; 10 MG/1; MG/1
TABLET, DELAYED RELEASE ORAL
Qty: 60 TABLET | Refills: 1 | Status: ON HOLD | OUTPATIENT
Start: 2022-04-24 | End: 2022-08-31

## 2022-04-24 ASSESSMENT — FIBROSIS 4 INDEX: FIB4 SCORE: 0.59

## 2022-04-24 ASSESSMENT — ENCOUNTER SYMPTOMS
FEVER: 0
COUGH: 1
ABDOMINAL PAIN: 0
SHORTNESS OF BREATH: 0
VOMITING: 0
HEMOPTYSIS: 0
CHILLS: 0
DIARRHEA: 0
PALPITATIONS: 0
SPUTUM PRODUCTION: 0
SORE THROAT: 0
NAUSEA: 1
HEARTBURN: 1
WHEEZING: 0
RHINORRHEA: 1

## 2022-04-24 NOTE — PROGRESS NOTES
"Subjective     Barbara David is a 27 y.o. female who presents with Chest Pressure (X a couple of weeks and states she is 15 weeks pregnant)            Cough  This is a new problem. Episode onset: over 2 weeks. The problem has been waxing and waning. The cough is non-productive. Associated symptoms include chest pain, heartburn, nasal congestion and rhinorrhea. Pertinent negatives include no chills, fever, hemoptysis, rash, sore throat, shortness of breath or wheezing. Her past medical history is significant for environmental allergies.   Initially had work-up 3 weeks ago for chest pressure and emergency department, apparently negative cardiac and pulmonary embolism work-up.  Since that time is noted more coughing.  Family history of asthma.  Current pregnancy with nausea, previously treated with Zofran.  Would prefer to try different medication since the Zofran provokes constipation.    Review of Systems   Constitutional: Negative for chills and fever.   HENT: Positive for rhinorrhea. Negative for sore throat.    Respiratory: Positive for cough. Negative for hemoptysis, sputum production, shortness of breath and wheezing.         Notes spells of persistent coughing.   Cardiovascular: Positive for chest pain. Negative for palpitations.   Gastrointestinal: Positive for heartburn and nausea. Negative for abdominal pain, diarrhea and vomiting.   Genitourinary: Positive for dysuria.        No vaginal discharge or bleeding.   Skin: Negative for rash.   Endo/Heme/Allergies: Positive for environmental allergies.              Objective     /70 (BP Location: Right arm, Patient Position: Sitting, BP Cuff Size: Adult)   Pulse 80   Temp 36.4 °C (97.6 °F) (Temporal)   Resp 16   Ht 1.626 m (5' 4\")   Wt 86.2 kg (190 lb)   LMP 01/05/2022   SpO2 97%   BMI 32.61 kg/m²      Physical Exam  Constitutional:       General: She is not in acute distress.     Appearance: She is well-developed. She is not " ill-appearing.   Cardiovascular:      Rate and Rhythm: Normal rate and regular rhythm.  No extrasystoles are present.     Heart sounds: Normal heart sounds. No murmur heard.  Pulmonary:      Effort: Pulmonary effort is normal.      Breath sounds: Normal breath sounds.   Chest:      Chest wall: Tenderness present. No deformity or swelling.       Abdominal:      General: There is no distension.   Musculoskeletal:      Right lower leg: No edema.      Left lower leg: No edema.   Skin:     General: Skin is warm and dry.   Neurological:      Mental Status: She is alert.   Psychiatric:         Behavior: Behavior is cooperative.                             Assessment & Plan        1. Mild reactive airways disease, unspecified whether persistent  - albuterol 108 (90 Base) MCG/ACT Aero Soln inhalation aerosol; Inhale 2 Puffs every 6 hours as needed (Coughing, wheezing).  Dispense: 8.5 g; Refill: 0    2. Gastroesophageal reflux disease without esophagitis  Use famotidine daily    3. Nausea and vomiting during pregnancy  Discontinue Zofran  - Doxylamine-Pyridoxine 10-10 MG Tablet Delayed Response delayed-release tablet; Start with 2 tablets at bedtime.  Then advance to 1 tablet in the morning, 1 tablet in the afternoon and 2 tablets at bedtime.  Dispense: 60 Tablet; Refill: 1    4. Pregnancy with 15 completed weeks gestation  Follow-up with obstetrician

## 2022-04-24 NOTE — LETTER
[facility] [address] [address]     April 24, 2022    Patient: Barbara David   YOB: 1995   Date of Visit: 4/24/2022       To Whom It May Concern:    Barbara David was unable to attend work April 24, 2022 for medical reasons.    Sincerely,     Salvador Gardiner M.D.

## 2022-06-12 ENCOUNTER — OFFICE VISIT (OUTPATIENT)
Dept: URGENT CARE | Facility: PHYSICIAN GROUP | Age: 27
End: 2022-06-12
Payer: COMMERCIAL

## 2022-06-12 VITALS
HEART RATE: 91 BPM | OXYGEN SATURATION: 98 % | SYSTOLIC BLOOD PRESSURE: 124 MMHG | RESPIRATION RATE: 18 BRPM | DIASTOLIC BLOOD PRESSURE: 82 MMHG | BODY MASS INDEX: 33.29 KG/M2 | WEIGHT: 195 LBS | HEIGHT: 64 IN | TEMPERATURE: 99 F

## 2022-06-12 DIAGNOSIS — J30.89 ALLERGIC RHINITIS DUE TO OTHER ALLERGIC TRIGGER, UNSPECIFIED SEASONALITY: ICD-10-CM

## 2022-06-12 DIAGNOSIS — Z3A.23 PREGNANCY WITH 23 COMPLETED WEEKS GESTATION: ICD-10-CM

## 2022-06-12 DIAGNOSIS — R11.2 NAUSEA AND VOMITING, INTRACTABILITY OF VOMITING NOT SPECIFIED, UNSPECIFIED VOMITING TYPE: ICD-10-CM

## 2022-06-12 DIAGNOSIS — O21.9 NAUSEA AND VOMITING DURING PREGNANCY: ICD-10-CM

## 2022-06-12 LAB
APPEARANCE UR: CLEAR
BILIRUB UR STRIP-MCNC: NEGATIVE MG/DL
COLOR UR AUTO: YELLOW
GLUCOSE UR STRIP.AUTO-MCNC: NEGATIVE MG/DL
KETONES UR STRIP.AUTO-MCNC: NEGATIVE MG/DL
LEUKOCYTE ESTERASE UR QL STRIP.AUTO: NORMAL
NITRITE UR QL STRIP.AUTO: NEGATIVE
PH UR STRIP.AUTO: 7 [PH] (ref 5–8)
PROT UR QL STRIP: NEGATIVE MG/DL
RBC UR QL AUTO: NEGATIVE
SP GR UR STRIP.AUTO: 1.01
UROBILINOGEN UR STRIP-MCNC: 2 MG/DL

## 2022-06-12 PROCEDURE — 81002 URINALYSIS NONAUTO W/O SCOPE: CPT | Performed by: FAMILY MEDICINE

## 2022-06-12 PROCEDURE — 99214 OFFICE O/P EST MOD 30 MIN: CPT | Performed by: FAMILY MEDICINE

## 2022-06-12 RX ORDER — DOXYLAMINE SUCCINATE AND PYRIDOXINE HYDROCHLORIDE, DELAYED RELEASE TABLETS 10 MG/10 MG 10; 10 MG/1; MG/1
1 TABLET, DELAYED RELEASE ORAL EVERY 8 HOURS PRN
Qty: 60 TABLET | Refills: 1 | Status: ON HOLD | OUTPATIENT
Start: 2022-06-12 | End: 2022-08-31

## 2022-06-12 RX ORDER — LORATADINE 10 MG/1
10 CAPSULE, LIQUID FILLED ORAL DAILY
Qty: 30 CAPSULE | Refills: 1 | Status: SHIPPED | OUTPATIENT
Start: 2022-06-12 | End: 2022-12-13

## 2022-06-12 RX ORDER — FLUTICASONE PROPIONATE 50 MCG
2 SPRAY, SUSPENSION (ML) NASAL DAILY
Qty: 1 G | Refills: 1 | Status: SHIPPED | OUTPATIENT
Start: 2022-06-12 | End: 2022-12-13

## 2022-06-12 ASSESSMENT — ENCOUNTER SYMPTOMS
CHILLS: 0
SHORTNESS OF BREATH: 0
FEVER: 0
MYALGIAS: 0
SORE THROAT: 0
VOMITING: 0
SINUS PRESSURE: 1
SINUS PAIN: 1
DIZZINESS: 0
NAUSEA: 0

## 2022-06-12 ASSESSMENT — FIBROSIS 4 INDEX: FIB4 SCORE: 0.59

## 2022-06-12 NOTE — LETTER
June 12, 2022         Patient: Barbara David   YOB: 1995   Date of Visit: 6/12/2022           To Whom it May Concern:    Barbara David was seen in my clinic on 6/12/2022. Excuse 6/12/2022, 6/13/2022. She may return to work on 6/14/2022.    If you have any questions or concerns, please don't hesitate to call.        Sincerely,           Ron Santos M.D.  Electronically Signed

## 2022-06-12 NOTE — PROGRESS NOTES
Subjective:   Barbara David is a 27 y.o. female who presents for Sinus Problem (Vomiting and fatigue x3 days)        Sinus Problem  This is a new (3 days reports sinus congestion) problem. Associated symptoms include congestion, sinus pressure and sneezing. Pertinent negatives include no chills, shortness of breath or sore throat. (Community wide allergen and pollen exposure noted    States current pregnancy 23 weeks, reports intermittent nausea and vomiting, able to tolerate fluids yet with nausea    Reports previous constipation with ondansetron during pregnancy)     PMH:  has a past medical history of Family history of diabetes mellitus (DM) (6/2/2016), GBS (group B Streptococcus carrier), +RV culture, currently pregnant (4/20/2015), Late prenatal care-1st visit at 22wks (1/7/2015), Supervision of normal first teen pregnancy (1/7/2015), and Urinary tract infection, site not specified (2014).  MEDS:   Current Outpatient Medications:   •  Doxylamine-Pyridoxine 10-10 MG Tablet Delayed Response delayed-release tablet, Take 1 Tablet by mouth every 8 hours as needed (nausea/vomiting)., Disp: 60 Tablet, Rfl: 1  •  Loratadine (CLARITIN) 10 MG Cap, Take 10 mg by mouth every day., Disp: 30 Capsule, Rfl: 1  •  fluticasone (FLONASE) 50 MCG/ACT nasal spray, Administer 2 Sprays into affected nostril(S) every day., Disp: 1 g, Rfl: 1  •  Prenatal Vit-Fe Fumarate-FA (PRENATAL 1+1 PO), Take  by mouth., Disp: , Rfl:   •  ondansetron (ZOFRAN) 4 MG Tab tablet, Take 4 mg by mouth every four hours as needed. (Patient not taking: Reported on 6/12/2022), Disp: , Rfl:   •  famotidine (PEPCID) 20 MG Tab, TAKE 1 TABLET BY MOUTH ONCE DAILY AT BEDTIME AS NEEDED (Patient not taking: Reported on 6/12/2022), Disp: , Rfl:   •  albuterol 108 (90 Base) MCG/ACT Aero Soln inhalation aerosol, Inhale 2 Puffs every 6 hours as needed (Coughing, wheezing). (Patient not taking: Reported on 6/12/2022), Disp: 8.5 g, Rfl: 0  •   "Doxylamine-Pyridoxine 10-10 MG Tablet Delayed Response delayed-release tablet, Start with 2 tablets at bedtime.  Then advance to 1 tablet in the morning, 1 tablet in the afternoon and 2 tablets at bedtime. (Patient not taking: Reported on 6/12/2022), Disp: 60 Tablet, Rfl: 1  ALLERGIES:   Allergies   Allergen Reactions   • Codeine Swelling     SURGHX:   Past Surgical History:   Procedure Laterality Date   • OVARIAN CYSTECTOMY Right 08/10/2020     SOCHX:  reports that she has never smoked. She has never used smokeless tobacco. She reports previous alcohol use. She reports previous drug use. Drug: Inhaled.  FH:   Family History   Problem Relation Age of Onset   • Heart Disease Maternal Grandfather    • Diabetes Father    • Alcohol/Drug Paternal Grandfather    • Diabetes Paternal Grandfather    • Diabetes Paternal Aunt    • Stroke Neg Hx    • Cancer Neg Hx    • Lung Disease Neg Hx    • Hypertension Neg Hx      Review of Systems   Constitutional: Negative for chills and fever.   HENT: Positive for congestion, sinus pressure, sinus pain and sneezing. Negative for sore throat.    Respiratory: Negative for shortness of breath.    Gastrointestinal: Negative for nausea and vomiting.   Genitourinary: Negative for dysuria and frequency.   Musculoskeletal: Negative for myalgias.   Skin: Negative for rash.   Neurological: Negative for dizziness.        Objective:   /82 (BP Location: Right arm, Patient Position: Sitting, BP Cuff Size: Adult)   Pulse 91   Temp 37.2 °C (99 °F) (Temporal)   Resp 18   Ht 1.626 m (5' 4\")   Wt 88.5 kg (195 lb)   LMP 01/05/2022   SpO2 98%   BMI 33.47 kg/m²   Physical Exam  Vitals and nursing note reviewed.   Constitutional:       General: She is not in acute distress.     Appearance: She is well-developed.   HENT:      Head: Normocephalic and atraumatic.      Right Ear: External ear normal.      Left Ear: External ear normal.      Nose: Mucosal edema present.      Right Turbinates: Swollen. "      Left Turbinates: Swollen.      Comments: Turbinates edematous     Mouth/Throat:      Mouth: Mucous membranes are moist.      Pharynx: Posterior oropharyngeal erythema (posterior pharyngeal drainage and erythema) present.      Comments: Posterior pharyngeal drainage noted  Eyes:      Conjunctiva/sclera: Conjunctivae normal.   Cardiovascular:      Rate and Rhythm: Normal rate.   Pulmonary:      Effort: Pulmonary effort is normal. No respiratory distress.      Breath sounds: Normal breath sounds. No wheezing or rhonchi.   Abdominal:      General: There is no distension.      Comments: Gravid   Musculoskeletal:         General: Normal range of motion.   Skin:     General: Skin is warm and dry.   Neurological:      General: No focal deficit present.      Mental Status: She is alert and oriented to person, place, and time. Mental status is at baseline.      Gait: Gait (gait at baseline) normal.   Psychiatric:         Judgment: Judgment normal.           Assessment/Plan:   1. Pregnancy with 23 completed weeks gestation    2. Nausea and vomiting, intractability of vomiting not specified, unspecified vomiting type  - Doxylamine-Pyridoxine 10-10 MG Tablet Delayed Response delayed-release tablet; Take 1 Tablet by mouth every 8 hours as needed (nausea/vomiting).  Dispense: 60 Tablet; Refill: 1    3. Nausea and vomiting during pregnancy  - Doxylamine-Pyridoxine 10-10 MG Tablet Delayed Response delayed-release tablet; Take 1 Tablet by mouth every 8 hours as needed (nausea/vomiting).  Dispense: 60 Tablet; Refill: 1  - POCT Urinalysis    4. Allergic rhinitis due to other allergic trigger, unspecified seasonality  - Loratadine (CLARITIN) 10 MG Cap; Take 10 mg by mouth every day.  Dispense: 30 Capsule; Refill: 1  - fluticasone (FLONASE) 50 MCG/ACT nasal spray; Administer 2 Sprays into affected nostril(S) every day.  Dispense: 1 g; Refill: 1        Medical Decision Making/Course:  In the course of preparing for this visit with  review of the pertinent past medical history, recent and past clinic visits, current medications, and performing chart, immunization, medical history and medication reconciliation, and in the further course of obtaining the current history pertinent to the clinic visit today, performing an exam and evaluation, ordering and independently evaluating labs, tests including point-of-care urinalysis, and/or procedures, prescribing any recommended new medications as noted above, providing any pertinent counseling and education and recommending further coordination of care including recommendations for symptomatic and supportive measures and recommendation to follow-up with obstetrics and her primary care provider for recheck evaluation and consideration of further management as needed and/or return to the urgent care for any persistent or worsening symptoms, at least  32 minutes of total time were spent during this encounter.      Discussed close monitoring, return precautions, and supportive measures of maintaining adequate fluid hydration and caloric intake, relative rest and symptom management as needed for pain and/or fever.    Differential diagnosis, natural history, supportive care, and indications for immediate follow-up discussed.     Advised the patient to follow-up with the primary care physician for recheck, reevaluation, and consideration of further management.    Please note that this dictation was created using voice recognition software. I have worked with consultants from the vendor as well as technical experts from [employer] [facility] [employer] to optimize the interface. I have made every reasonable attempt to correct obvious errors, but I expect that there are errors of grammar and possibly content that I did not discover before finalizing the note.

## 2022-06-12 NOTE — PATIENT INSTRUCTIONS
Vomiting, Adult  Vomiting occurs when stomach contents are thrown up and out of the mouth. Many people notice nausea before vomiting. Vomiting can make you feel weak and cause you to become dehydrated. Dehydration can make you feel tired and thirsty, cause you to have a dry mouth, and decrease how often you urinate. Older adults and people who have other diseases or a weak body defense system (immune system) are at higher risk for dehydration. It is important to treat vomiting as told by your health care provider.  Follow these instructions at home:    Eating and drinking         Follow these recommendations as told by your health care provider:  Take an oral rehydration solution (ORS). This is a drink that is sold at pharmacies and retail stores.  Eat bland, easy-to-digest foods in small amounts as you are able. These foods include bananas, applesauce, rice, lean meats, toast, and crackers.  Drink clear fluids slowly and in small amounts as you are able. Clear fluids include water, ice chips, low-calorie sports drinks, and fruit juice that has water added (diluted fruit juice).  Avoid drinking fluids that contain a lot of sugar or caffeine, such as energy drinks, sports drinks, and soda.  Avoid alcohol.  Avoid spicy or fatty foods.    General instructions  Wash your hands often using soap and water. If soap and water are not available, use hand . Make sure that everyone in your household washes their hands frequently.  Take over-the-counter and prescription medicines only as told by your health care provider.  Rest at home while you recover.  Watch your condition for any changes.  Keep all follow-up visits as told by your health care provider. This is important.  Contact a health care provider if:  Your vomiting gets worse.  You have new symptoms.  You have a fever.  You cannot drink fluids without vomiting.  You feel light-headed or dizzy.  You have a headache.  You have muscle cramps.  You have a  rash.  You have pain while urinating.  Get help right away if:  You have pain in your chest, neck, arm, or jaw.  You feel extremely weak or you faint.  You have persistent vomiting.  You have vomit that is bright red or looks like black coffee grounds.  You have stools that are bloody or black, or stools that look like tar.  You have a severe headache, a stiff neck, or both.  You have severe pain, cramping, or bloating in your abdomen.  You have trouble breathing or you are breathing very quickly.  Your heart is beating very quickly.  Your skin feels cold and clammy.  You feel confused.  You have signs of dehydration, such as:  Dark urine, very little urine, or no urine.  Cracked lips.  Dry mouth.  Sunken eyes.  Sleepiness.  Weakness.  These symptoms may represent a serious problem that is an emergency. Do not wait to see if the symptoms will go away. Get medical help right away. Call your local emergency services (911 in the [place] Do not drive yourself to the hospital.  Summary  Vomiting occurs when stomach contents are thrown up and out of the mouth. Vomiting can cause you to become dehydrated. Older adults and people who have other diseases or a weak immune system are at higher risk for dehydration.  It is important to treat vomiting as told by your health care provider. Follow your health care provider's instructions about eating and drinking.  Wash your hands often using soap and water. If soap and water are not available, use hand . Make sure that everyone in your household washes their hands frequently.  Watch your condition for any changes and for signs of dehydration.  Keep all follow-up visits as told by your health care provider. This is important.  This information is not intended to replace advice given to you by your health care provider. Make sure you discuss any questions you have with your health care provider.  Document Released: 01/13/2017 Document Revised: 05/28/2019 Document Reviewed:  05/28/2019  [employer] Patient Education © 2020 [employer]  Allergic Rhinitis, Adult  Allergic rhinitis is a reaction to allergens in the air. Allergens are tiny specks (particles) in the air that cause your body to have an allergic reaction. This condition cannot be passed from person to person (is not contagious). Allergic rhinitis cannot be cured, but it can be controlled.  There are two types of allergic rhinitis:  Seasonal. This type is also called hay fever. It happens only during certain times of the year.  Perennial. This type can happen at any time of the year.  What are the causes?  This condition may be caused by:  Pollen from grasses, trees, and weeds.  House dust mites.  Pet dander.  Mold.  What are the signs or symptoms?  Symptoms of this condition include:  Sneezing.  Runny or stuffy nose (nasal congestion).  A lot of mucus in the back of the throat (postnasal drip).  Itchy nose.  Tearing of the eyes.  Trouble sleeping.  Being sleepy during day.  How is this treated?  There is no cure for this condition. You should avoid things that trigger your symptoms (allergens). Treatment can help to relieve symptoms. This may include:  Medicines that block allergy symptoms, such as antihistamines. These may be given as a shot, nasal spray, or pill.  Shots that are given until your body becomes less sensitive to the allergen (desensitization).  Stronger medicines, if all other treatments have not worked.  Follow these instructions at home:  Avoiding allergens    Find out what you are allergic to. Common allergens include smoke, dust, and pollen.  Avoid them if you can. These are some of the things that you can do to avoid allergens:  Replace carpet with wood, tile, or vinyl kyler. Carpet can trap dander and dust.  Clean any mold found in the home.  Do not smoke. Do not allow smoking in your home.  Change your heating and air conditioning filter at least once a month.  During allergy season:  Keep windows  closed as much as you can. If possible, use air conditioning when there is a lot of pollen in the air.  Use a special filter for allergies with your furnace and air conditioner.  Plan outdoor activities when pollen counts are lowest. This is usually during the early morning or evening hours.  If you do go outdoors when pollen count is high, wear a special mask for people with allergies.  When you come indoors, take a shower and change your clothes before sitting on furniture or bedding.  General instructions  Do not use fans in your home.  Do not hang clothes outside to dry.  Wear sunglasses to keep pollen out of your eyes.  Wash your hands right away after you touch household pets.  Take over-the-counter and prescription medicines only as told by your doctor.  Keep all follow-up visits as told by your doctor. This is important.  Contact a doctor if:  You have a fever.  You have a cough that does not go away (is persistent).  You start to make whistling sounds when you breathe (wheeze).  Your symptoms do not get better with treatment.  You have thick fluid coming from your nose.  You start to have nosebleeds.  Get help right away if:  Your tongue or your lips are swollen.  You have trouble breathing.  You feel dizzy or you feel like you are going to pass out (faint).  You have cold sweats.  Summary  Allergic rhinitis is a reaction to allergens in the air.  This condition may be caused by allergens. These include pollen, dust mites, pet dander, and mold.  Symptoms include a runny, itchy nose, sneezing, or tearing eyes. You may also have trouble sleeping or feel sleepy during the day.  Treatment includes taking medicines and avoiding allergens. You may also get shots or take stronger medicines.  Get help if you have a fever or a cough that does not stop. Get help right away if you are short of breath.  This information is not intended to replace advice given to you by your health care provider. Make sure you discuss  any questions you have with your health care provider.  Document Released: 04/18/2012 Document Revised: 04/07/2020 Document Reviewed: 07/09/2019  [employer] Patient Education © 2020 [employer]

## 2022-06-30 ENCOUNTER — OFFICE VISIT (OUTPATIENT)
Dept: URGENT CARE | Facility: PHYSICIAN GROUP | Age: 27
End: 2022-06-30
Payer: COMMERCIAL

## 2022-06-30 ENCOUNTER — HOSPITAL ENCOUNTER (OUTPATIENT)
Facility: MEDICAL CENTER | Age: 27
End: 2022-06-30
Attending: PHYSICIAN ASSISTANT
Payer: COMMERCIAL

## 2022-06-30 VITALS
HEIGHT: 64 IN | OXYGEN SATURATION: 97 % | TEMPERATURE: 98 F | BODY MASS INDEX: 33.26 KG/M2 | WEIGHT: 194.8 LBS | RESPIRATION RATE: 16 BRPM | SYSTOLIC BLOOD PRESSURE: 112 MMHG | HEART RATE: 87 BPM | DIASTOLIC BLOOD PRESSURE: 76 MMHG

## 2022-06-30 DIAGNOSIS — R30.0 DYSURIA: ICD-10-CM

## 2022-06-30 DIAGNOSIS — O26.892 DYSURIA DURING PREGNANCY IN SECOND TRIMESTER: ICD-10-CM

## 2022-06-30 DIAGNOSIS — R30.0 DYSURIA DURING PREGNANCY IN SECOND TRIMESTER: ICD-10-CM

## 2022-06-30 LAB
APPEARANCE UR: CLEAR
BILIRUB UR STRIP-MCNC: NORMAL MG/DL
COLOR UR AUTO: YELLOW
GLUCOSE UR STRIP.AUTO-MCNC: NORMAL MG/DL
KETONES UR STRIP.AUTO-MCNC: NORMAL MG/DL
LEUKOCYTE ESTERASE UR QL STRIP.AUTO: NORMAL
NITRITE UR QL STRIP.AUTO: NORMAL
PH UR STRIP.AUTO: 7 [PH] (ref 5–8)
PROT UR QL STRIP: NORMAL MG/DL
RBC UR QL AUTO: NORMAL
SP GR UR STRIP.AUTO: 1.02
UROBILINOGEN UR STRIP-MCNC: 1 MG/DL

## 2022-06-30 PROCEDURE — 87086 URINE CULTURE/COLONY COUNT: CPT

## 2022-06-30 PROCEDURE — 99214 OFFICE O/P EST MOD 30 MIN: CPT | Performed by: PHYSICIAN ASSISTANT

## 2022-06-30 PROCEDURE — 81002 URINALYSIS NONAUTO W/O SCOPE: CPT | Performed by: PHYSICIAN ASSISTANT

## 2022-06-30 RX ORDER — CEFPODOXIME PROXETIL 200 MG/1
200 TABLET, FILM COATED ORAL 2 TIMES DAILY
Qty: 14 TABLET | Refills: 0 | Status: SHIPPED | OUTPATIENT
Start: 2022-06-30 | End: 2022-07-07

## 2022-06-30 ASSESSMENT — FIBROSIS 4 INDEX: FIB4 SCORE: 0.59

## 2022-06-30 NOTE — LETTER
June 30, 2022    To Whom It May Concern:         This is confirmation that Barbara Daivd attended her scheduled appointment with Blanca Osman P.A.-C. on 6/30/22.  Please excuse patient from work today.         If you have any questions please do not hesitate to call me at the phone number listed below.    Sincerely,          Blanca Osman P.A.-C.  [phone]

## 2022-07-01 DIAGNOSIS — R30.0 DYSURIA: ICD-10-CM

## 2022-07-01 NOTE — PROGRESS NOTES
"Subjective:   Barbara David is a 27 y.o. female who presents for UTI (X 2 days)     2 days of dysuria associated with mild leg pain.  No gross hematuria.  Is pregnant 25 weeks; frequent urination.  Has had 2 other children, has not had symptoms like this.  No fevers or chills. No nausea or vomiting.  Feeling fetal movements. NO vaginal bleeding.  No significant pelvic pain.  Was treated for UTI 2 moths ago during pregnancy, notes unavailable as was done in outside facility.  No other signs of systemic illness.    No abnormal vaginal discharge, odor.   No STI concerns      Medications:  albuterol Aers  Doxylamine-Pyridoxine Tbec  famotidine Tabs  fluticasone  Loratadine Caps  PRENATAL 1+1 PO    Allergies:             Codeine    Surgical History:         Past Surgical History:   Procedure Laterality Date   • OVARIAN CYSTECTOMY Right 08/10/2020       Past Social Hx:  Barbara David  reports that she has never smoked. She has never used smokeless tobacco. She reports previous alcohol use. She reports previous drug use. Drug: Inhaled.     Past Family Hx:   Barbara David family history includes Alcohol/Drug in her paternal grandfather; Diabetes in her father, paternal aunt, and paternal grandfather; Heart Disease in her maternal grandfather.       Problem list, medications, and allergies reviewed by myself today in Epic.     Objective:     /76 (BP Location: Left arm, Patient Position: Sitting, BP Cuff Size: Adult)   Pulse 87   Temp 36.7 °C (98 °F)   Resp 16   Ht 1.626 m (5' 4\")   Wt 88.4 kg (194 lb 12.8 oz)   LMP 01/05/2022   SpO2 97%   BMI 33.44 kg/m²     Physical Exam  Vitals and nursing note reviewed.   Constitutional:       General: She is not in acute distress.     Appearance: Normal appearance. She is not ill-appearing, toxic-appearing or diaphoretic.   HENT:      Nose: Nose normal.   Eyes:      Extraocular Movements: Extraocular movements intact. "   Cardiovascular:      Rate and Rhythm: Normal rate.      Pulses: Normal pulses.      Heart sounds: Normal heart sounds.   Pulmonary:      Effort: Pulmonary effort is normal.      Breath sounds: Normal breath sounds.   Abdominal:      General: There is no distension.      Palpations: Abdomen is soft.      Tenderness: There is no abdominal tenderness. There is right CVA tenderness and left CVA tenderness. There is no guarding or rebound.      Hernia: No hernia is present.   Neurological:      Mental Status: She is alert and oriented to person, place, and time.   Psychiatric:         Mood and Affect: Mood normal.         Behavior: Behavior is cooperative.     There is mild flank tenderness to percussion.  No significant abdominal pain to palpation  UA No leuks or nitrites, no blood in urine.   Assessment/Plan:     Diagnosis and Associated Orders:     1. Dysuria  - POCT Urinalysis  - URINE CULTURE(NEW); Future  - cefpodoxime (VANTIN) 200 MG Tab; Take 1 Tablet by mouth 2 times a day for 7 days.  Dispense: 14 Tablet; Refill: 0    2. Dysuria during pregnancy in second trimester        Comments/MDM:    Patient presents with symptomatic dysuria.  She is 25 weeks pregnant.  She was treated for UTI several months ago.  Results and records not available.  Given symptoms and flank tenderness will initiate antibiotic therapy.  Urine culture collected.  Advised calling OB tomorrow to discuss further.  Indications for return discussed including increased flank pain, fever, chills, myalgias, nausea, vomiting, vaginal bleeding, severe abdominal pain.  Patient demonstrates verbal understanding in this regard.  Increase fluid intake.    I personally reviewed prior external notes and test results pertinent to today's visit.  Red flags discussed as well as indications to present to the Emergency Department.  Supportive care, natural history, differential diagnoses, and indications for immediate follow-up discussed.  Patient expresses  understanding and agrees to plan.  Patient denies any other questions or concerns.    Follow-up with the primary care physician for recheck, reevaluation, and consideration of further management.      Please note that this dictation was created using voice recognition software. I have made a reasonable attempt to correct obvious errors, but I expect that there are errors of grammar and possibly content that I did not discover before finalizing the note.    This note was electronically signed by Blanca Osman PA-C

## 2022-07-03 LAB
BACTERIA UR CULT: NORMAL
SIGNIFICANT IND 70042: NORMAL
SITE SITE: NORMAL
SOURCE SOURCE: NORMAL

## 2022-08-29 ENCOUNTER — APPOINTMENT (OUTPATIENT)
Dept: RADIOLOGY | Facility: MEDICAL CENTER | Age: 27
End: 2022-08-29
Attending: OBSTETRICS & GYNECOLOGY
Payer: COMMERCIAL

## 2022-08-29 ENCOUNTER — HOSPITAL ENCOUNTER (INPATIENT)
Facility: MEDICAL CENTER | Age: 27
LOS: 2 days | End: 2022-08-31
Attending: OBSTETRICS & GYNECOLOGY | Admitting: OBSTETRICS & GYNECOLOGY
Payer: COMMERCIAL

## 2022-08-29 DIAGNOSIS — O60.00 PRETERM LABOR WITHOUT DELIVERY: ICD-10-CM

## 2022-08-29 LAB
ABO GROUP BLD: NORMAL
ALBUMIN SERPL BCP-MCNC: 3.3 G/DL (ref 3.2–4.9)
ALBUMIN/GLOB SERPL: 1.1 G/DL
ALP SERPL-CCNC: 179 U/L (ref 30–99)
ALT SERPL-CCNC: 5 U/L (ref 2–50)
ANION GAP SERPL CALC-SCNC: 11 MMOL/L (ref 7–16)
AST SERPL-CCNC: 11 U/L (ref 12–45)
BILIRUB SERPL-MCNC: 0.2 MG/DL (ref 0.1–1.5)
BLD GP AB SCN SERPL QL: NORMAL
BUN SERPL-MCNC: 7 MG/DL (ref 8–22)
CALCIUM SERPL-MCNC: 8.2 MG/DL (ref 8.5–10.5)
CHLORIDE SERPL-SCNC: 107 MMOL/L (ref 96–112)
CO2 SERPL-SCNC: 17 MMOL/L (ref 20–33)
CREAT SERPL-MCNC: 0.72 MG/DL (ref 0.5–1.4)
ERYTHROCYTE [DISTWIDTH] IN BLOOD BY AUTOMATED COUNT: 42.5 FL (ref 35.9–50)
GFR SERPLBLD CREATININE-BSD FMLA CKD-EPI: 117 ML/MIN/1.73 M 2
GLOBULIN SER CALC-MCNC: 2.9 G/DL (ref 1.9–3.5)
GLUCOSE SERPL-MCNC: 84 MG/DL (ref 65–99)
HCT VFR BLD AUTO: 32.5 % (ref 37–47)
HGB BLD-MCNC: 10.4 G/DL (ref 12–16)
MAGNESIUM SERPL-MCNC: 5.9 MG/DL (ref 1.5–2.5)
MCH RBC QN AUTO: 25.2 PG (ref 27–33)
MCHC RBC AUTO-ENTMCNC: 32 G/DL (ref 33.6–35)
MCV RBC AUTO: 78.9 FL (ref 81.4–97.8)
PLATELET # BLD AUTO: 204 K/UL (ref 164–446)
PMV BLD AUTO: 11.7 FL (ref 9–12.9)
POTASSIUM SERPL-SCNC: 4.3 MMOL/L (ref 3.6–5.5)
PROT SERPL-MCNC: 6.2 G/DL (ref 6–8.2)
RBC # BLD AUTO: 4.12 M/UL (ref 4.2–5.4)
RH BLD: NORMAL
SODIUM SERPL-SCNC: 135 MMOL/L (ref 135–145)
WBC # BLD AUTO: 12 K/UL (ref 4.8–10.8)

## 2022-08-29 PROCEDURE — 302449 STATCHG TRIAGE ONLY (STATISTIC)

## 2022-08-29 PROCEDURE — 700102 HCHG RX REV CODE 250 W/ 637 OVERRIDE(OP): Performed by: OBSTETRICS & GYNECOLOGY

## 2022-08-29 PROCEDURE — 86850 RBC ANTIBODY SCREEN: CPT

## 2022-08-29 PROCEDURE — 86900 BLOOD TYPING SEROLOGIC ABO: CPT

## 2022-08-29 PROCEDURE — 83735 ASSAY OF MAGNESIUM: CPT

## 2022-08-29 PROCEDURE — A9270 NON-COVERED ITEM OR SERVICE: HCPCS

## 2022-08-29 PROCEDURE — 700111 HCHG RX REV CODE 636 W/ 250 OVERRIDE (IP)

## 2022-08-29 PROCEDURE — 700105 HCHG RX REV CODE 258: Performed by: OBSTETRICS & GYNECOLOGY

## 2022-08-29 PROCEDURE — 304965 HCHG RECOVERY SERVICES

## 2022-08-29 PROCEDURE — 770002 HCHG ROOM/CARE - OB PRIVATE (112)

## 2022-08-29 PROCEDURE — 59409 OBSTETRICAL CARE: CPT

## 2022-08-29 PROCEDURE — 96366 THER/PROPH/DIAG IV INF ADDON: CPT

## 2022-08-29 PROCEDURE — 76816 OB US FOLLOW-UP PER FETUS: CPT

## 2022-08-29 PROCEDURE — 86901 BLOOD TYPING SEROLOGIC RH(D): CPT

## 2022-08-29 PROCEDURE — 88307 TISSUE EXAM BY PATHOLOGIST: CPT

## 2022-08-29 PROCEDURE — 96365 THER/PROPH/DIAG IV INF INIT: CPT

## 2022-08-29 PROCEDURE — A9270 NON-COVERED ITEM OR SERVICE: HCPCS | Performed by: OBSTETRICS & GYNECOLOGY

## 2022-08-29 PROCEDURE — 700111 HCHG RX REV CODE 636 W/ 250 OVERRIDE (IP): Performed by: OBSTETRICS & GYNECOLOGY

## 2022-08-29 PROCEDURE — 96375 TX/PRO/DX INJ NEW DRUG ADDON: CPT

## 2022-08-29 PROCEDURE — 700102 HCHG RX REV CODE 250 W/ 637 OVERRIDE(OP)

## 2022-08-29 PROCEDURE — 10907ZC DRAINAGE OF AMNIOTIC FLUID, THERAPEUTIC FROM PRODUCTS OF CONCEPTION, VIA NATURAL OR ARTIFICIAL OPENING: ICD-10-PCS | Performed by: OBSTETRICS & GYNECOLOGY

## 2022-08-29 PROCEDURE — 96368 THER/DIAG CONCURRENT INF: CPT

## 2022-08-29 PROCEDURE — 36415 COLL VENOUS BLD VENIPUNCTURE: CPT

## 2022-08-29 PROCEDURE — 85027 COMPLETE CBC AUTOMATED: CPT

## 2022-08-29 PROCEDURE — 80053 COMPREHEN METABOLIC PANEL: CPT

## 2022-08-29 RX ORDER — SODIUM CHLORIDE, SODIUM LACTATE, POTASSIUM CHLORIDE, CALCIUM CHLORIDE 600; 310; 30; 20 MG/100ML; MG/100ML; MG/100ML; MG/100ML
INJECTION, SOLUTION INTRAVENOUS PRN
Status: DISCONTINUED | OUTPATIENT
Start: 2022-08-29 | End: 2022-08-31 | Stop reason: HOSPADM

## 2022-08-29 RX ORDER — LORAZEPAM 2 MG/ML
0.5 INJECTION INTRAMUSCULAR ONCE
Status: DISCONTINUED | OUTPATIENT
Start: 2022-08-29 | End: 2022-08-29

## 2022-08-29 RX ORDER — SODIUM CHLORIDE, SODIUM LACTATE, POTASSIUM CHLORIDE, CALCIUM CHLORIDE 600; 310; 30; 20 MG/100ML; MG/100ML; MG/100ML; MG/100ML
INJECTION, SOLUTION INTRAVENOUS CONTINUOUS
Status: DISCONTINUED | OUTPATIENT
Start: 2022-08-29 | End: 2022-08-29 | Stop reason: ALTCHOICE

## 2022-08-29 RX ORDER — MAGNESIUM SULFATE HEPTAHYDRATE 40 MG/ML
INJECTION, SOLUTION INTRAVENOUS
Status: COMPLETED
Start: 2022-08-29 | End: 2022-08-29

## 2022-08-29 RX ORDER — MISOPROSTOL 200 UG/1
TABLET ORAL
Status: COMPLETED
Start: 2022-08-29 | End: 2022-08-29

## 2022-08-29 RX ORDER — METHYLERGONOVINE MALEATE 0.2 MG/ML
0.2 INJECTION INTRAVENOUS
Status: DISCONTINUED | OUTPATIENT
Start: 2022-08-29 | End: 2022-08-31 | Stop reason: HOSPADM

## 2022-08-29 RX ORDER — MISOPROSTOL 200 UG/1
600 TABLET ORAL
Status: COMPLETED | OUTPATIENT
Start: 2022-08-29 | End: 2022-08-29

## 2022-08-29 RX ORDER — BETAMETHASONE SODIUM PHOSPHATE AND BETAMETHASONE ACETATE 3; 3 MG/ML; MG/ML
12 INJECTION, SUSPENSION INTRA-ARTICULAR; INTRALESIONAL; INTRAMUSCULAR; SOFT TISSUE ONCE
Status: DISCONTINUED | OUTPATIENT
Start: 2022-08-30 | End: 2022-08-29 | Stop reason: HOSPADM

## 2022-08-29 RX ORDER — ACETAMINOPHEN 500 MG
1000 TABLET ORAL EVERY 6 HOURS PRN
Status: DISCONTINUED | OUTPATIENT
Start: 2022-08-29 | End: 2022-08-31 | Stop reason: HOSPADM

## 2022-08-29 RX ORDER — MAGNESIUM SULFATE HEPTAHYDRATE 40 MG/ML
4 INJECTION, SOLUTION INTRAVENOUS ONCE
Status: COMPLETED | OUTPATIENT
Start: 2022-08-29 | End: 2022-08-29

## 2022-08-29 RX ORDER — MAGNESIUM SULFATE HEPTAHYDRATE 40 MG/ML
2 INJECTION, SOLUTION INTRAVENOUS ONCE
Status: COMPLETED | OUTPATIENT
Start: 2022-08-29 | End: 2022-08-29

## 2022-08-29 RX ORDER — OXYTOCIN 10 [USP'U]/ML
10 INJECTION, SOLUTION INTRAMUSCULAR; INTRAVENOUS
Status: DISCONTINUED | OUTPATIENT
Start: 2022-08-29 | End: 2022-08-29 | Stop reason: HOSPADM

## 2022-08-29 RX ORDER — MAGNESIUM SULFATE HEPTAHYDRATE 40 MG/ML
2 INJECTION, SOLUTION INTRAVENOUS CONTINUOUS
Status: DISCONTINUED | OUTPATIENT
Start: 2022-08-29 | End: 2022-08-29 | Stop reason: ALTCHOICE

## 2022-08-29 RX ORDER — OXYCODONE HYDROCHLORIDE 5 MG/1
5 TABLET ORAL EVERY 4 HOURS PRN
Status: DISCONTINUED | OUTPATIENT
Start: 2022-08-29 | End: 2022-08-31 | Stop reason: HOSPADM

## 2022-08-29 RX ORDER — CALCIUM GLUCONATE 94 MG/ML
1 INJECTION, SOLUTION INTRAVENOUS
Status: DISCONTINUED | OUTPATIENT
Start: 2022-08-29 | End: 2022-08-29 | Stop reason: HOSPADM

## 2022-08-29 RX ORDER — DOCUSATE SODIUM 100 MG/1
100 CAPSULE, LIQUID FILLED ORAL 2 TIMES DAILY PRN
Status: DISCONTINUED | OUTPATIENT
Start: 2022-08-29 | End: 2022-08-31 | Stop reason: HOSPADM

## 2022-08-29 RX ORDER — IBUPROFEN 800 MG/1
800 TABLET ORAL EVERY 8 HOURS PRN
Status: DISCONTINUED | OUTPATIENT
Start: 2022-08-29 | End: 2022-08-31 | Stop reason: HOSPADM

## 2022-08-29 RX ADMIN — SODIUM CHLORIDE, POTASSIUM CHLORIDE, SODIUM LACTATE AND CALCIUM CHLORIDE 1000 ML: 600; 310; 30; 20 INJECTION, SOLUTION INTRAVENOUS at 13:01

## 2022-08-29 RX ADMIN — MAGNESIUM SULFATE HEPTAHYDRATE 4 G: 40 INJECTION, SOLUTION INTRAVENOUS at 13:02

## 2022-08-29 RX ADMIN — MAGNESIUM SULFATE HEPTAHYDRATE 2 G: 40 INJECTION, SOLUTION INTRAVENOUS at 13:22

## 2022-08-29 RX ADMIN — FENTANYL CITRATE 50 MCG: 50 INJECTION, SOLUTION INTRAMUSCULAR; INTRAVENOUS at 16:05

## 2022-08-29 RX ADMIN — MAGNESIUM SULFATE HEPTAHYDRATE 2 G: 40 INJECTION, SOLUTION INTRAVENOUS at 15:40

## 2022-08-29 RX ADMIN — MAGNESIUM SULFATE HEPTAHYDRATE 2 G/HR: 40 INJECTION, SOLUTION INTRAVENOUS at 13:33

## 2022-08-29 RX ADMIN — OXYTOCIN 2000 ML/HR: 10 INJECTION, SOLUTION INTRAMUSCULAR; INTRAVENOUS at 17:04

## 2022-08-29 RX ADMIN — MISOPROSTOL 600 MCG: 200 TABLET ORAL at 18:14

## 2022-08-29 RX ADMIN — OXYTOCIN 125 ML/HR: 10 INJECTION, SOLUTION INTRAMUSCULAR; INTRAVENOUS at 18:23

## 2022-08-29 RX ADMIN — IBUPROFEN 800 MG: 800 TABLET, FILM COATED ORAL at 18:00

## 2022-08-29 RX ADMIN — ACETAMINOPHEN 1000 MG: 500 TABLET ORAL at 21:10

## 2022-08-29 RX ADMIN — AMPICILLIN SODIUM 2000 MG: 2 INJECTION, POWDER, FOR SOLUTION INTRAVENOUS at 14:07

## 2022-08-29 ASSESSMENT — FIBROSIS 4 INDEX: FIB4 SCORE: 0.59

## 2022-08-29 ASSESSMENT — PAIN DESCRIPTION - PAIN TYPE: TYPE: ACUTE PAIN

## 2022-08-29 NOTE — PROGRESS NOTES
OBPN  Feeling more pressure  Still with some spotting on her pad  EFM- cat 1  [place]-q2-3  SVE 3/80/-2  PTL despite tocolysis  Anticipate she will continue to labor]  NICU will be notified

## 2022-08-29 NOTE — H&P
DATE OF ADMISSION:  2022     IDENTIFICATION:  This is a 27-year-old  4, para 2-0-1-2, at 33 weeks   and 5 days.  Her EDC is 10/12/2022.  She was admitted as a transfer from   [facility] for  labor with vaginal bleeding.     HISTORY OF PRESENT ILLNESS:  The patient was transferred here.  She originally   presented to [facility].  She was evaluated there.    She reports she was getting her daughter ready for school this morning and   had a sudden gush of blood.  She denied further leaking.  No clots.  She has   continued to have light bleeding, but not as much as before.  She reports   cramping since that time just overall does not feel well.  She was found to be   2 cm, 50% effaced, -2 station.  She was given a dose of nifedipine and   betamethasone.  Her group B strep was collected and she was transported here.    On arrival here, we are picking up contractions about every 6 minutes.  She   says she feels about the same that she did this morning.  She has had a little   bit more bleeding.  It is dark brown on the pad.     She has been seen by followup by Dr. Tripp for prenatal care with no   complications.  [facility] has [facility].  She was scheduled for followup for relook at cardiac views as they   were incomplete previously.  Her placenta was posterior without previa.  She   was started on Zoloft at the end of July for some anxiety.     PRENATAL LABORATORY:  She is Rh positive, rubella immune.  HIV, RPR, hepatitis   C virus, hepatitis B surface antigen negative, varicella nonimmune.    Gonorrhea, chlamydia, and trichomonas were negative.  She had a negative AFP,   1-hour glucose was 144 and with a normal 3-hour.  Her Tdap is she is done with   that.     OBSTETRICAL HISTORY:  She is a G4, P2-0-1-2,  spontaneous vaginal delivery   37 weeks female, 6 pounds,  spontaneous vaginal delivery  37 weeks, 6   pounds 11 ounces male A1 gestational diabetes.  She also has had one   miscarriage.     GYNECOLOGIC HISTORY:  She denies abnormal Pap smears or sexually transmitted   infection.     PAST MEDICAL HISTORY:  Anxiety.     PAST SURGICAL HISTORY:  Laparoscopy for ovarian torsion in .     CURRENT MEDICATIONS:  Zoloft 50 mg daily.     SOCIAL HISTORY:  She denies use of tobacco, alcohol or drugs,      FAMILY HISTORY:  Diabetes.     PHYSICAL EXAMINATION:    VITAL SIGNS:  Blood pressure 119/79, temperature 97.2, pulse 80, respirations   18.  GENERAL:  She is pleasant, cooperative and appears her stated age.  ABDOMEN:  Soft, gravid. Uterus is nontender on palpation.  The remainder of   her abdominal exam is also normal and nontender.  EXTREMITIES:  Nontender.  No edema.  PELVIC:  Sterile vaginal exam 2 58 -3.  Tocometer reveals contractions every   6 minutes, fetal heart tracings baseline 150s with moderate variability with   accelerations and no decelerations.     ASSESSMENT AND PLAN:  A 27-year-old G4, P2-0-1-2, at 33 weeks and 5 days by   last menstrual period consistent with 8-week ultrasound, transferred here with   vaginal bleeding,  labor.  The patient was given 1 dose of nifedipine,   but reports feeling about the same.  Her cervix is unchanged, but we still   are seeing some bleeding and I would like to see if we can get her uterus to   quiet down. We will start her on magnesium sulfate for tocolysis.  She has   already received one dose of betamethasone and she will have a second dose   tomorrow.  We will obtain a type and screen to be able to have blood available   if needed. We will obtain an OB limited ultrasound to look at estimated fetal   weight, position of the baby and ASHLEY.  We will proceed with continuous   monitoring.  Plan of care has been discussed with the patient and her   questions have been answered and in addition, we will continue GBS prophylaxis   with ampicillin 2 grams IV  q.6 hours.  GBS culture was collected at the other   hospital and we will need to follow up on those  results.        ______________________________  MD ROMEL Washburn/SHERRIE    DD:  08/29/2022 13:05  DT:  08/29/2022 14:26    Job#:  [id number]

## 2022-08-29 NOTE — PROGRESS NOTES
1200: Pt arrived into 23o via transport from [facility]. Pt is  with EDC 10/12 making her 33&5 wks. Pt reporting contractions q3-6 minutes that rate 7/10 pain. Small amount of new blood noted to pad and inner thigh. Pt reports good FM and denies LOF concern for ROM. Dr Thompson called with update on pt arrival and report. MD to come to bedside.     Dr Monroe at bedside, SVE as charted. Unchanged from previous exam.     1302: Magnesium sulfate initiated per order. See MAR.     1315: Ultrasound at bedside.     1525: Dr Thompson called to bedside d/t pt increased discomfort and intensity of contractions. SVE as charted.     1535: Pt moved into 213 for labor.     1638: Pt called out reporting increased pain and urge to push. SVE as charted. Pt coached on breathing techniques.     1653: Pt reporting increased pain and pressure. SVE as charted. Pt moved into OR 3 for delivery. NICU updated.     1700: SVE complete.     1703.  of viable female infant. NICU team at bedside for resuscitation. Order to turn off magnesium sulfate infusion.      182: Moderate bleeding noted to pad. Cytotec given per MAR. Pt able to urinate.     : Report to Abigail OSBORNE. POC discussed.

## 2022-08-30 LAB
ERYTHROCYTE [DISTWIDTH] IN BLOOD BY AUTOMATED COUNT: 41.1 FL (ref 35.9–50)
HCT VFR BLD AUTO: 28.2 % (ref 37–47)
HGB BLD-MCNC: 9.3 G/DL (ref 12–16)
MCH RBC QN AUTO: 25.5 PG (ref 27–33)
MCHC RBC AUTO-ENTMCNC: 33 G/DL (ref 33.6–35)
MCV RBC AUTO: 77.5 FL (ref 81.4–97.8)
PATHOLOGY CONSULT NOTE: NORMAL
PLATELET # BLD AUTO: 205 K/UL (ref 164–446)
PMV BLD AUTO: 11.8 FL (ref 9–12.9)
RBC # BLD AUTO: 3.64 M/UL (ref 4.2–5.4)
WBC # BLD AUTO: 13.9 K/UL (ref 4.8–10.8)

## 2022-08-30 PROCEDURE — 36415 COLL VENOUS BLD VENIPUNCTURE: CPT

## 2022-08-30 PROCEDURE — 700102 HCHG RX REV CODE 250 W/ 637 OVERRIDE(OP): Performed by: OBSTETRICS & GYNECOLOGY

## 2022-08-30 PROCEDURE — A9270 NON-COVERED ITEM OR SERVICE: HCPCS | Performed by: OBSTETRICS & GYNECOLOGY

## 2022-08-30 PROCEDURE — 770002 HCHG ROOM/CARE - OB PRIVATE (112)

## 2022-08-30 PROCEDURE — 85027 COMPLETE CBC AUTOMATED: CPT

## 2022-08-30 RX ADMIN — IBUPROFEN 800 MG: 800 TABLET, FILM COATED ORAL at 02:19

## 2022-08-30 RX ADMIN — IBUPROFEN 800 MG: 800 TABLET, FILM COATED ORAL at 09:17

## 2022-08-30 RX ADMIN — IBUPROFEN 800 MG: 800 TABLET, FILM COATED ORAL at 17:21

## 2022-08-30 RX ADMIN — ACETAMINOPHEN 1000 MG: 500 TABLET ORAL at 22:44

## 2022-08-30 ASSESSMENT — PAIN DESCRIPTION - PAIN TYPE
TYPE: ACUTE PAIN

## 2022-08-30 ASSESSMENT — EDINBURGH POSTNATAL DEPRESSION SCALE (EPDS)
I HAVE FELT SAD OR MISERABLE: NO, NOT AT ALL
I HAVE BLAMED MYSELF UNNECESSARILY WHEN THINGS WENT WRONG: YES, SOME OF THE TIME
I HAVE BEEN ANXIOUS OR WORRIED FOR NO GOOD REASON: YES, SOMETIMES
I HAVE FELT SCARED OR PANICKY FOR NO GOOD REASON: YES, SOMETIMES
THINGS HAVE BEEN GETTING ON TOP OF ME: NO, MOST OF THE TIME I HAVE COPED QUITE WELL
I HAVE BEEN ABLE TO LAUGH AND SEE THE FUNNY SIDE OF THINGS: AS MUCH AS I ALWAYS COULD
I HAVE BEEN SO UNHAPPY THAT I HAVE HAD DIFFICULTY SLEEPING: NOT AT ALL
I HAVE LOOKED FORWARD WITH ENJOYMENT TO THINGS: AS MUCH AS I EVER DID

## 2022-08-30 NOTE — PROGRESS NOTES
0700- Received report from INDIA Alvarado. Assumed care of pt. 12 hour chart check, MAR, and orders reviewed.     0755- Assessment complete. Fundus firm and palpable, lochia scant rubra. Pt. Will call for pain medication as needed. Infant in NICU, patient using electric breast pump Q3 hours. POC discussed with patient at bedside. All questions and concerns discussed at this time.

## 2022-08-30 NOTE — PROGRESS NOTES
Dict [id number]  , female, ap 6&8, taken to NICU  Placenta del spontaneously and intact, c/w abruption  Perineum intact  EBL 300ml

## 2022-08-30 NOTE — PROGRESS NOTES
Pt. Arrived by wheelchair to postpartum  with belongings to room 301, received report from Abigail OSBORNE. Pt. Doing well. Funds firm, light manas/rubra. Pt oriented to room, emergency call light.  Call light within reach.

## 2022-08-30 NOTE — L&D DELIVERY NOTE
DATE OF SERVICE:  2022     This is a 27-year-old  4, para 2-0-1-2, at 33 weeks and 5 days.  She is   a patient of Dr. Tripp.  Pregnancy has been uncomplicated.  She was   transferred this morning after presenting to another hospital complaining of   some contractions and bleeding.  She was found to be 2 cm at that time, she   was transferred here.  She continued to contract.  She was placed on magnesium   for tocolysis.  Despite magnesium she changed her cervix from 2 to 3 cm and   we did attempt a bolus of magnesium to see if we could slow things down, but   she continued to labor, went on to be 6 cm.  In the meantime, she was having a   lot of pressure and feeling very anxious and short of breath during her   labor.  Ultimately, she reached complete.  She underwent artificial rupture of   membranes, bloody fluid was noted.  She pushed over one contraction to   deliver a female infant from OA presentation.  Delivery of the head was manual   assisted, shoulders and the rest of body followed. NICU team showed up   shortly after delivery.  Cord was doubly clamped and cut at 1 minute and baby   was taken over to the warmer for the NICU team to assess.  Placenta delivered   spontaneously and intact prior to me being able to cut the cord.  Inspection   of the vagina and perineum revealed no lacerations.  Uterus firmed up nicely   with IV Pitocin.   mL. Baby's Apgars are 6 and 8.  She is being taken   to the NICU.  Mom is doing well and will be taken back to her recovery room.    Sponge and needle counts were correct.        ______________________________  MD ROMEL Washburn/LE    DD:  2022 17:19  DT:  2022 20:24    Job#:  [id number]

## 2022-08-30 NOTE — CARE PLAN
The patient is Stable - Low risk of patient condition declining or worsening    Shift Goals  Clinical Goals: adequate pain control, rest, ambulate    Progress made toward(s) clinical / shift goals: Patient VS stable and WDL. Fundus firm, lochia scant. Patient ambulating and voiding without difficulty. Tolerating PO at this time.    Patient is not progressing towards the following goals:

## 2022-08-30 NOTE — DISCHARGE PLANNING
Birth certificate has been completed.   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) admitted with septic shock 2/2 aspiration PNA s/p improvement, now with recurrent fever on 10/24 back on IV abx, presumed aspiration event (other infectious w/u neg to date)     # Aspiration: s/p treatment for aspiration PNA with zosyn, now with recurrent fever and O2 requirement suspect recurrent event, no other ways to mitigate event as on TF with HOB elevation  - c/w zosyn, D 5/5  - WBC normalized, O2 requirements minimal and intermittent   - poor airway clearance at baseline, remains an aspiration risk    # Hypernatremia:   - resolved, c/w TF and free water     # Seizure disorder:  - continue Keppra    Needs cannot be met a prior group home, planned for LTC.  Appreciate [facility]inton, family wants full code at this point  Medically stable for dc to LTC

## 2022-08-30 NOTE — PROGRESS NOTES
1915 Report received from Eunice HERNANDEZ RN at bedside and assumed care. POC discussed with pt and s/o and encouraged to state needs or questions at any time.     2020 Pt assisted up to the bathroom and voided.    2044 Pt assisted to NICU via wheelchair to see baby.    2059 Pt transferred to PP via wheelchair.    2110 Report given to Lesli ESTRADA RN at bedside

## 2022-08-30 NOTE — PROGRESS NOTES
PPD#1    S/ feeling pretty good, bleeding light, no pain, baby seems to be doing well in NICU    O/  Vitals:    22 2018 22 2111 22 2336 22 0200   BP: 118/66 119/76 111/73 124/81   Pulse: 71 64 (!) 53 60   Resp:  17 16 17   Temp:  36.4 °C (97.6 °F) 36.8 °C (98.3 °F) 36.3 °C (97.4 °F)   TempSrc:  Temporal Temporal Temporal   SpO2:  98% 97% 97%   Weight:       Height:          Recent Labs     22  1350 22  0236   WBC 12.0* 13.9*   RBC 4.12* 3.64*   HEMOGLOBIN 10.4* 9.3*   HEMATOCRIT 32.5* 28.2*   MCV 78.9* 77.5*   MCH 25.2* 25.5*   RDW 42.5 41.1   PLATELETCT 204 205   MPV 11.7 11.8      Gen-comfortable  Abd-soft, ff at umb, NT uterus  Ext-no calf tenderness, trace LE edema    A&P/PPD#1 26yo  s/p  at 33w5d, GBS unknown  Routine pp care  Baby in NICU  Ant d/c in am tomorrow

## 2022-08-30 NOTE — DISCHARGE PLANNING
Discharge Planning Assessment Post Partum     Reason for Referral: NICU  Address: [address] [place] [address]  Type of Living Situation:Stable living situation lives FOB and additional children   Mom Diagnosis: Postpartum   Baby Diagnosis: NICU 33.6  Primary Language: English      Name of Baby: Emelia Valente  Father of the Baby: Felipe Valente  Involved in baby’s care? Yes  Contact Information: [phone]     Prenatal Care: Yes  Mom's PCP: None  PCP for new baby:Dr Franks     Support System: MOB stated good support   Coping/Bonding between mother & baby: MOB coping/bonding in NICU  Source of Feeding: Breastfeeding   Supplies for Infant: MOB stated having all needed supplies      Mom's Insurance: [place]  Baby Covered on Insurance:MOB will add baby   Mother Employed/School: [employer]  Other children in the home/names & ages: 1 yr old Mele and  7 yr old Rupert     Financial Hardship/Income: None   Mom's Mental status: Stable and alert   Services used prior to admit: None     CPS History: None  Psychiatric History: None  Domestic Violence History: None  Drug/ETOH History: None     Resources Provided:  provided resources for postpartum depression   Referrals Made: None      Clearance for Discharge: Baby is cleared to discharge with MOB and FOB when medically cleared      Ongoing Plan: will continue to follow and provide support during NICU admission

## 2022-08-31 VITALS
BODY MASS INDEX: 34.38 KG/M2 | SYSTOLIC BLOOD PRESSURE: 118 MMHG | TEMPERATURE: 98 F | DIASTOLIC BLOOD PRESSURE: 68 MMHG | RESPIRATION RATE: 17 BRPM | HEART RATE: 65 BPM | WEIGHT: 194 LBS | OXYGEN SATURATION: 95 % | HEIGHT: 63 IN

## 2022-08-31 PROCEDURE — 700102 HCHG RX REV CODE 250 W/ 637 OVERRIDE(OP): Performed by: OBSTETRICS & GYNECOLOGY

## 2022-08-31 PROCEDURE — A9270 NON-COVERED ITEM OR SERVICE: HCPCS | Performed by: OBSTETRICS & GYNECOLOGY

## 2022-08-31 RX ORDER — IBUPROFEN 800 MG/1
800 TABLET ORAL EVERY 8 HOURS PRN
Qty: 30 TABLET | Refills: 1 | Status: ON HOLD | OUTPATIENT
Start: 2022-08-31 | End: 2022-12-29 | Stop reason: SDUPTHER

## 2022-08-31 RX ADMIN — ACETAMINOPHEN 1000 MG: 500 TABLET ORAL at 06:29

## 2022-08-31 NOTE — DISCHARGE INSTRUCTIONS

## 2022-08-31 NOTE — PROGRESS NOTES
Hospital Day : 2    S:doing well; pumping with baby in icn    O:  Vitals:    22 2336 22 0200 22 0600 22 1800   BP: 111/73 124/81 124/75 116/65   Pulse: (!) 53 60 62 60   Resp: 16 17 17 16   Temp: 36.8 °C (98.3 °F) 36.3 °C (97.4 °F) 36.3 °C (97.3 °F) 36.3 °C (97.4 °F)   TempSrc: Temporal Temporal Temporal Temporal   SpO2: 97% 97% 97% 99%   Weight:       Height:           Recent Labs     22  1350 22  0236   WBC 12.0* 13.9*   RBC 4.12* 3.64*   HEMOGLOBIN 10.4* 9.3*   HEMATOCRIT 32.5* 28.2*   MCV 78.9* 77.5*   MCH 25.2* 25.5*   MCHC 32.0* 33.0*   RDW 42.5 41.1   PLATELETCT 204 205   MPV 11.7 11.8     Recent Labs     22  1350   SODIUM 135   POTASSIUM 4.3   CHLORIDE 107   CO2 17*   GLUCOSE 84   BUN 7*   CREATININE 0.72   CALCIUM 8.2*         Abd soft ff    A: p2 sp  at 33.5; doing well    P: dc

## 2022-08-31 NOTE — CONSULTS
This LC cleared consult order only.  Please see previous LC's chart note dated 08/30/22 for initial lactation visit findings and POC.

## 2022-08-31 NOTE — CARE PLAN
The patient is Stable - Low risk of patient condition declining or worsening    Shift Goals  Clinical Goals: adequate pain control, rest, ambulate    Progress made toward(s) clinical / shift goals:  Pain controlled with PRN medications. Ambulating and visiting infant in NICU.     Patient is not progressing towards the following goals:

## 2022-08-31 NOTE — DISCHARGE SUMMARY
DATE OF ADMISSION:  2022   DATE OF DISCHARGE:  2022     ADMITTING DIAGNOSIS:  Pregnancy at 33 and 5/7th weeks' with vaginal bleeding   and  labor.     DISCHARGE DIAGNOSES:  1.  Pregnancy at 33 and 5/7th weeks' with vaginal bleeding and  labor.  2.  Status post mag tocolysis.  3.  Status post normal spontaneous vaginal delivery.     HOSPITAL COURSE IN DETAIL:  This patient was transferred to [facility] from [facility] for  labor, where she was given a dose of steroids and   nifedipine.  Upon admission, she continued to contract despite mag tocolysis   and progressed over rapid labor curve, eventually delivering her infant on the    with Apgars of 6 and 8.  Baby went to the NICU and the patient to   recovery in stable condition.  On postpartum day #1, she is doing well without   complaint, tolerating regular diet with minimal lochia.  Her H and H is   stable at 9.3 and 28.2.  Today, postpartum day #2, she desires discharge home.    She is afebrile.     PHYSICAL EXAMINATION:    VITAL SIGNS:  Within normal limits.  ABDOMEN:  Soft with a full fundus below the umbilicus.     ASSESSMENT:  At this time, postpartum day #2, status post spontaneous vaginal   delivery, at 33 and 5/7th weeks', doing well, desires discharge home.     PLAN:    1.  Discharge home.  2.  Follow up in 6 weeks.  3.  Pelvic rest.  4.  Lift precautions.  5.  Scripts for Motrin written.        ______________________________  MD ALYSSA Best/HAN    DD:  2022 05:23  DT:  2022 05:45    Job#:  [id number]

## 2022-08-31 NOTE — LACTATION NOTE
Barbara is preparing to be discharged from hospital today. She has scheduled an appointment for tomorrow to be enrolled in the WIC program and will  a breast pump from them. In the meantime she will be using a manual pump at home tonight. We reviewed massage and hand expression for complete emptying of breasts. Progression to breast feeding in the NICU discussed, baby not yet taking oral feedings. Proper milk storage and handling was talked about and CDC guidelines provided.

## 2022-11-14 ENCOUNTER — HOSPITAL ENCOUNTER (OUTPATIENT)
Facility: MEDICAL CENTER | Age: 27
End: 2022-11-14
Attending: OBSTETRICS & GYNECOLOGY | Admitting: OBSTETRICS & GYNECOLOGY
Payer: COMMERCIAL

## 2022-11-30 ENCOUNTER — OFFICE VISIT (OUTPATIENT)
Dept: URGENT CARE | Facility: PHYSICIAN GROUP | Age: 27
End: 2022-11-30
Payer: COMMERCIAL

## 2022-11-30 VITALS
DIASTOLIC BLOOD PRESSURE: 76 MMHG | BODY MASS INDEX: 33.66 KG/M2 | SYSTOLIC BLOOD PRESSURE: 120 MMHG | TEMPERATURE: 97.2 F | HEIGHT: 63 IN | WEIGHT: 190 LBS | RESPIRATION RATE: 16 BRPM | HEART RATE: 104 BPM | OXYGEN SATURATION: 97 %

## 2022-11-30 DIAGNOSIS — J02.9 ACUTE VIRAL PHARYNGITIS: ICD-10-CM

## 2022-11-30 DIAGNOSIS — J02.9 SORE THROAT: ICD-10-CM

## 2022-11-30 LAB
INT CON NEG: NEGATIVE
INT CON POS: POSITIVE
S PYO AG THROAT QL: NEGATIVE

## 2022-11-30 PROCEDURE — 87880 STREP A ASSAY W/OPTIC: CPT | Performed by: NURSE PRACTITIONER

## 2022-11-30 PROCEDURE — 99213 OFFICE O/P EST LOW 20 MIN: CPT | Performed by: NURSE PRACTITIONER

## 2022-11-30 RX ORDER — LIDOCAINE HYDROCHLORIDE 20 MG/ML
5 SOLUTION OROPHARYNGEAL EVERY 6 HOURS PRN
Qty: 140 ML | Refills: 0 | Status: SHIPPED | OUTPATIENT
Start: 2022-11-30 | End: 2022-12-07

## 2022-11-30 ASSESSMENT — ENCOUNTER SYMPTOMS
NECK PAIN: 0
VOMITING: 0
WHEEZING: 0
CONSTIPATION: 0
MYALGIAS: 0
SHORTNESS OF BREATH: 0
COUGH: 0
NAUSEA: 0
CHILLS: 0
ABDOMINAL PAIN: 0
EYE DISCHARGE: 0
EYE REDNESS: 0
SORE THROAT: 1
DIARRHEA: 0
FEVER: 0
HEADACHES: 0
DIZZINESS: 0
WEAKNESS: 0

## 2022-11-30 ASSESSMENT — FIBROSIS 4 INDEX: FIB4 SCORE: 0.65

## 2022-11-30 NOTE — LETTER
November 30, 2022       Patient: Barbara David   YOB: 1995   Date of Visit: 11/30/2022         To Whom It May Concern:    In my medical opinion, I recommend that Barbara David be excused from work today as she was seen in clinic.    If you have any questions or concerns, please don't hesitate to call [phone]          Sincerely,          [facility]  Electronically Signed

## 2022-11-30 NOTE — PROGRESS NOTES
Hortensia David is a 27 y.o. female who presents with Sore Throat (X 4 days, pt states no other symptoms/)            HPI  States has been experiencing sore throat x4 days.  States worsening sore throat started yesterday.  Has tried over-the-counter multisymptom medication including Mucinex DM.  No salt water gargle.  Denies nasal congestion or runny nose, mild postnasal drainage.  Denies fever, malaise or body aches.  No known exposure to others with known illness.    PMH:  has a past medical history of Family history of diabetes mellitus (DM) (6/2/2016), GBS (group B Streptococcus carrier), +RV culture, currently pregnant (4/20/2015), Late prenatal care-1st visit at 22wks (1/7/2015), Supervision of normal first teen pregnancy (1/7/2015), and Urinary tract infection, site not specified (2014).  MEDS:   Current Outpatient Medications:     lidocaine (XYLOCAINE) 2 % Solution, Take 5 mL by mouth every 6 hours as needed for Throat/Mouth Pain for up to 7 days. Gargle and spit out after salt water gargle, Disp: 140 mL, Rfl: 0    ibuprofen (MOTRIN) 800 MG Tab, Take 1 Tablet by mouth every 8 hours as needed for Moderate Pain., Disp: 30 Tablet, Rfl: 1    Loratadine (CLARITIN) 10 MG Cap, Take 10 mg by mouth every day. (Patient not taking: Reported on 11/30/2022), Disp: 30 Capsule, Rfl: 1    fluticasone (FLONASE) 50 MCG/ACT nasal spray, Administer 2 Sprays into affected nostril(S) every day. (Patient not taking: Reported on 11/30/2022), Disp: 1 g, Rfl: 1    famotidine (PEPCID) 20 MG Tab, , Disp: , Rfl:     albuterol 108 (90 Base) MCG/ACT Aero Soln inhalation aerosol, Inhale 2 Puffs every 6 hours as needed (Coughing, wheezing). (Patient not taking: Reported on 11/30/2022), Disp: 8.5 g, Rfl: 0    Prenatal Vit-Fe Fumarate-FA (PRENATAL 1+1 PO), Take  by mouth. (Patient not taking: Reported on 11/30/2022), Disp: , Rfl:   ALLERGIES:   Allergies   Allergen Reactions    Codeine Swelling     SURGHX:   Past  "Surgical History:   Procedure Laterality Date    OVARIAN CYSTECTOMY Right 08/10/2020     SOCHX:  reports that she has never smoked. She has never used smokeless tobacco. She reports that she does not currently use alcohol. She reports that she does not currently use drugs after having used the following drugs: Inhaled.  FH: Family history was reviewed, no pertinent findings to report    Review of Systems   Constitutional:  Negative for chills, fever and malaise/fatigue.   HENT:  Positive for sore throat. Negative for congestion and ear pain.    Eyes:  Negative for discharge and redness.   Respiratory:  Negative for cough, shortness of breath and wheezing.    Gastrointestinal:  Negative for abdominal pain, constipation, diarrhea, nausea and vomiting.   Musculoskeletal:  Negative for myalgias and neck pain.   Skin:  Negative for itching and rash.   Neurological:  Negative for dizziness, weakness and headaches.   Endo/Heme/Allergies:  Negative for environmental allergies.   All other systems reviewed and are negative.           Objective     /76   Pulse (!) 104   Temp 36.2 °C (97.2 °F)   Resp 16   Ht 1.6 m (5' 3\")   Wt 86.2 kg (190 lb)   SpO2 97%   BMI 33.66 kg/m²      Physical Exam  Vitals reviewed.   Constitutional:       General: She is awake. She is not in acute distress.     Appearance: Normal appearance. She is well-developed. She is not ill-appearing, toxic-appearing or diaphoretic.   HENT:      Head: Normocephalic.      Right Ear: Ear canal and external ear normal. A middle ear effusion is present.      Left Ear: Ear canal and external ear normal. A middle ear effusion is present.      Nose: Nose normal.      Mouth/Throat:      Lips: Pink.      Mouth: Mucous membranes are moist.      Pharynx: Uvula midline. Posterior oropharyngeal erythema present. No pharyngeal swelling, oropharyngeal exudate or uvula swelling.      Tonsils: No tonsillar exudate or tonsillar abscesses. 1+ on the right. 1+ on the " left.   Eyes:      Conjunctiva/sclera: Conjunctivae normal.      Pupils: Pupils are equal, round, and reactive to light.   Cardiovascular:      Rate and Rhythm: Normal rate.   Pulmonary:      Effort: Pulmonary effort is normal.   Musculoskeletal:         General: Normal range of motion.      Cervical back: Normal range of motion and neck supple.   Skin:     General: Skin is warm and dry.   Neurological:      Mental Status: She is alert and oriented to person, place, and time.   Psychiatric:         Attention and Perception: Attention normal.         Mood and Affect: Mood normal.         Speech: Speech normal.         Behavior: Behavior normal. Behavior is cooperative.                           Assessment & Plan        1. Sore throat    - lidocaine (XYLOCAINE) 2 % Solution; Take 5 mL by mouth every 6 hours as needed for Throat/Mouth Pain for up to 7 days. Gargle and spit out after salt water gargle  Dispense: 140 mL; Refill: 0  - POCT Rapid Strep A    2. Acute viral pharyngitis    -Maintain hydration/water intake  -May use over the counter Ibuprofen/Tylenol as needed for any fever, body aches or throat pain  -May take long acting antihistamine for seasonal allergy symptoms as needed  -May use over the counter saline nasal spray for nasal congestion as needed  -May use over the counter Nasacort/Flonase for nasal congestion as needed   -May use throat lozenges for throat discomfort as needed   -Change toothbrush   -May gargle with salt water up to 4x/day as needed for throat discomfort (1 tsp salt dissolved in 1 cup warm water)  -May drink smoothies for nutrition if too painful to swallow solid foods  -Monitor for any sinus pain/pressure with sinus congestion with thick mucus production, sinus headache, cough, shortness of breath, fever- need re-evaluation

## 2022-12-13 ENCOUNTER — PRE-ADMISSION TESTING (OUTPATIENT)
Dept: ADMISSIONS | Facility: MEDICAL CENTER | Age: 27
End: 2022-12-13
Attending: OBSTETRICS & GYNECOLOGY
Payer: COMMERCIAL

## 2022-12-14 ENCOUNTER — HOSPITAL ENCOUNTER (OUTPATIENT)
Facility: MEDICAL CENTER | Age: 27
End: 2022-12-14
Attending: NURSE PRACTITIONER
Payer: COMMERCIAL

## 2022-12-14 ENCOUNTER — OFFICE VISIT (OUTPATIENT)
Dept: URGENT CARE | Facility: PHYSICIAN GROUP | Age: 27
End: 2022-12-14
Payer: COMMERCIAL

## 2022-12-14 VITALS
WEIGHT: 200.2 LBS | TEMPERATURE: 97.2 F | SYSTOLIC BLOOD PRESSURE: 122 MMHG | BODY MASS INDEX: 35.47 KG/M2 | DIASTOLIC BLOOD PRESSURE: 74 MMHG | RESPIRATION RATE: 16 BRPM | HEIGHT: 63 IN | OXYGEN SATURATION: 95 % | HEART RATE: 99 BPM

## 2022-12-14 DIAGNOSIS — R30.0 DYSURIA: ICD-10-CM

## 2022-12-14 DIAGNOSIS — M54.50 ACUTE LEFT-SIDED LOW BACK PAIN WITHOUT SCIATICA: ICD-10-CM

## 2022-12-14 LAB
APPEARANCE UR: NORMAL
BILIRUB UR STRIP-MCNC: NEGATIVE MG/DL
COLOR UR AUTO: NORMAL
GLUCOSE UR STRIP.AUTO-MCNC: NEGATIVE MG/DL
INT CON NEG: NEGATIVE
INT CON POS: POSITIVE
KETONES UR STRIP.AUTO-MCNC: NEGATIVE MG/DL
LEUKOCYTE ESTERASE UR QL STRIP.AUTO: NEGATIVE
NITRITE UR QL STRIP.AUTO: NEGATIVE
PH UR STRIP.AUTO: 6.5 [PH] (ref 5–8)
POC URINE PREGNANCY TEST: NEGATIVE
PROT UR QL STRIP: 30 MG/DL
RBC UR QL AUTO: NEGATIVE
SP GR UR STRIP.AUTO: 1.02
UROBILINOGEN UR STRIP-MCNC: 1 MG/DL

## 2022-12-14 PROCEDURE — 81002 URINALYSIS NONAUTO W/O SCOPE: CPT | Performed by: NURSE PRACTITIONER

## 2022-12-14 PROCEDURE — 81025 URINE PREGNANCY TEST: CPT | Performed by: NURSE PRACTITIONER

## 2022-12-14 PROCEDURE — 99213 OFFICE O/P EST LOW 20 MIN: CPT | Performed by: NURSE PRACTITIONER

## 2022-12-14 PROCEDURE — 87086 URINE CULTURE/COLONY COUNT: CPT

## 2022-12-14 ASSESSMENT — ENCOUNTER SYMPTOMS
ABDOMINAL PAIN: 0
NAUSEA: 0
FEVER: 0
HEADACHES: 0
CHILLS: 0
FLANK PAIN: 0
WEAKNESS: 0
VOMITING: 0
CONSTIPATION: 0
DIARRHEA: 0
DIZZINESS: 0
MYALGIAS: 1
BACK PAIN: 1

## 2022-12-14 ASSESSMENT — FIBROSIS 4 INDEX: FIB4 SCORE: 0.65

## 2022-12-14 NOTE — PROGRESS NOTES
Subjective     Barbara David is a 27 y.o. female who presents with Dysuria (X 3 days, pain, frequent urge, little to no output) and Low Back Pain            Dysuria   Associated symptoms include frequency and urgency. Pertinent negatives include no chills, flank pain, hematuria, nausea or vomiting. States has been experiencing left-sided low back pain x3 days.  Mild dysuria with urgency frequency.  Denies vaginal discharge, hematuria, vaginal itching or recent diarrhea.  Denies history of low back pain.  Does have  as well as 1-year-old and does carry on her hip.  Has been using ibuprofen and heat application.  Recently received Depo injection for birth control.  Last menstrual period was normal.    PMH:  has a past medical history of Family history of diabetes mellitus (DM) (2016), Gynecological disorder, Late prenatal care-1st visit at 22wks (2015), and Urinary tract infection, site not specified (2014).  MEDS:   Current Outpatient Medications:     ibuprofen (MOTRIN) 800 MG Tab, Take 1 Tablet by mouth every 8 hours as needed for Moderate Pain., Disp: 30 Tablet, Rfl: 1  ALLERGIES:   Allergies   Allergen Reactions    Codeine Swelling     SURGHX:   Past Surgical History:   Procedure Laterality Date    OVARIAN CYSTECTOMY Right 08/10/2020     SOCHX:  reports that she has never smoked. She has never used smokeless tobacco. She reports that she does not currently use alcohol. She reports current drug use. Drug: Inhaled.  FH: Family history was reviewed, no pertinent findings to report      Review of Systems   Constitutional:  Negative for chills, fever and malaise/fatigue.   Gastrointestinal:  Negative for abdominal pain, constipation, diarrhea, nausea and vomiting.   Genitourinary:  Positive for dysuria, frequency and urgency. Negative for flank pain and hematuria.   Musculoskeletal:  Positive for back pain and myalgias.   Skin:  Negative for itching and rash.   Neurological:   "Negative for dizziness, weakness and headaches.   All other systems reviewed and are negative.           Objective     /74   Pulse 99   Temp 36.2 °C (97.2 °F) (Temporal)   Resp 16   Ht 1.6 m (5' 3\")   Wt 90.8 kg (200 lb 3.2 oz)   LMP 12/05/2022 (Exact Date)   SpO2 95%   BMI 35.46 kg/m²      Physical Exam  Vitals reviewed.   Constitutional:       General: She is awake. She is not in acute distress.     Appearance: Normal appearance. She is well-developed. She is not ill-appearing, toxic-appearing or diaphoretic.   HENT:      Head: Normocephalic.   Cardiovascular:      Rate and Rhythm: Normal rate.   Pulmonary:      Effort: Pulmonary effort is normal.   Abdominal:      General: There is no distension.      Palpations: Abdomen is soft.      Tenderness: There is no abdominal tenderness. There is no right CVA tenderness, left CVA tenderness, guarding or rebound.   Musculoskeletal:      Cervical back: Tenderness present.        Back:    Skin:     General: Skin is warm and dry.   Neurological:      Mental Status: She is alert and oriented to person, place, and time.   Psychiatric:         Attention and Perception: Attention normal.         Mood and Affect: Mood normal.         Speech: Speech normal.         Behavior: Behavior normal. Behavior is cooperative.                           Assessment & Plan        1. Dysuria    - POCT Urinalysis  - POCT PREGNANCY  - URINE CULTURE(NEW); Future    2. Acute left-sided low back pain without sciatica    - URINE CULTURE(NEW); Future    -May continue over-the-counter ibuprofen as well as heat application to muscles as needed   -Maintain hydration/water intake  -Urinate more frequently and empty bladder completely  -Practice good toileting hygiene after bowel movements and sexual intercourse, refrain from sexual intercourse until infection cleared  -Monitor for back/flank pain, difficulty urinating, blood in urine- need re-evaluation    MyChart release of urine culture for " possible antibiotics with symptoms, patient notified

## 2022-12-14 NOTE — LETTER
December 14, 2022       Patient: Barbara David   YOB: 1995   Date of Visit: 12/14/2022         To Whom It May Concern:    In my medical opinion, I recommend that Barbara David be excused from work today as she was seen in clinic.    If you have any questions or concerns, please don't hesitate to call [phone]          Sincerely,          Misti Diane A.P.R.N.  Electronically Signed

## 2022-12-15 DIAGNOSIS — M54.50 ACUTE LEFT-SIDED LOW BACK PAIN WITHOUT SCIATICA: ICD-10-CM

## 2022-12-15 DIAGNOSIS — R30.0 DYSURIA: ICD-10-CM

## 2022-12-17 LAB
BACTERIA UR CULT: NORMAL
SIGNIFICANT IND 70042: NORMAL
SITE SITE: NORMAL
SOURCE SOURCE: NORMAL

## 2022-12-27 ENCOUNTER — PRE-ADMISSION TESTING (OUTPATIENT)
Dept: ADMISSIONS | Facility: MEDICAL CENTER | Age: 27
End: 2022-12-27
Attending: OBSTETRICS & GYNECOLOGY
Payer: COMMERCIAL

## 2022-12-27 DIAGNOSIS — Z01.812 PRE-OPERATIVE LABORATORY EXAMINATION: ICD-10-CM

## 2022-12-27 LAB
ANION GAP SERPL CALC-SCNC: 12 MMOL/L (ref 7–16)
BUN SERPL-MCNC: 12 MG/DL (ref 8–22)
CALCIUM SERPL-MCNC: 9.4 MG/DL (ref 8.5–10.5)
CHLORIDE SERPL-SCNC: 109 MMOL/L (ref 96–112)
CO2 SERPL-SCNC: 21 MMOL/L (ref 20–33)
CREAT SERPL-MCNC: 0.8 MG/DL (ref 0.5–1.4)
ERYTHROCYTE [DISTWIDTH] IN BLOOD BY AUTOMATED COUNT: 40.6 FL (ref 35.9–50)
GFR SERPLBLD CREATININE-BSD FMLA CKD-EPI: 103 ML/MIN/1.73 M 2
GLUCOSE SERPL-MCNC: 89 MG/DL (ref 65–99)
HCG SERPL QL: NEGATIVE
HCT VFR BLD AUTO: 35.9 % (ref 37–47)
HGB BLD-MCNC: 10.8 G/DL (ref 12–16)
MCH RBC QN AUTO: 22.7 PG (ref 27–33)
MCHC RBC AUTO-ENTMCNC: 30.1 G/DL (ref 33.6–35)
MCV RBC AUTO: 75.4 FL (ref 81.4–97.8)
PLATELET # BLD AUTO: 311 K/UL (ref 164–446)
PMV BLD AUTO: 11 FL (ref 9–12.9)
POTASSIUM SERPL-SCNC: 4.2 MMOL/L (ref 3.6–5.5)
RBC # BLD AUTO: 4.76 M/UL (ref 4.2–5.4)
SODIUM SERPL-SCNC: 142 MMOL/L (ref 135–145)
WBC # BLD AUTO: 7.9 K/UL (ref 4.8–10.8)

## 2022-12-27 PROCEDURE — 85027 COMPLETE CBC AUTOMATED: CPT

## 2022-12-27 PROCEDURE — 80048 BASIC METABOLIC PNL TOTAL CA: CPT

## 2022-12-27 PROCEDURE — 84703 CHORIONIC GONADOTROPIN ASSAY: CPT

## 2022-12-27 PROCEDURE — 36415 COLL VENOUS BLD VENIPUNCTURE: CPT

## 2022-12-29 ENCOUNTER — ANESTHESIA EVENT (OUTPATIENT)
Dept: SURGERY | Facility: MEDICAL CENTER | Age: 27
End: 2022-12-29
Payer: COMMERCIAL

## 2022-12-29 ENCOUNTER — HOSPITAL ENCOUNTER (OUTPATIENT)
Facility: MEDICAL CENTER | Age: 27
End: 2022-12-29
Attending: OBSTETRICS & GYNECOLOGY | Admitting: OBSTETRICS & GYNECOLOGY
Payer: COMMERCIAL

## 2022-12-29 ENCOUNTER — ANESTHESIA (OUTPATIENT)
Dept: SURGERY | Facility: MEDICAL CENTER | Age: 27
End: 2022-12-29
Payer: COMMERCIAL

## 2022-12-29 VITALS
RESPIRATION RATE: 18 BRPM | HEIGHT: 64 IN | WEIGHT: 215.39 LBS | OXYGEN SATURATION: 95 % | TEMPERATURE: 97.1 F | BODY MASS INDEX: 36.77 KG/M2 | HEART RATE: 90 BPM | SYSTOLIC BLOOD PRESSURE: 132 MMHG | DIASTOLIC BLOOD PRESSURE: 77 MMHG

## 2022-12-29 DIAGNOSIS — Z48.89 ENCOUNTER FOR POSTOPERATIVE CARE: ICD-10-CM

## 2022-12-29 LAB
ABO GROUP BLD: NORMAL
BLD GP AB SCN SERPL QL: NORMAL
PATHOLOGY CONSULT NOTE: NORMAL
RH BLD: NORMAL

## 2022-12-29 PROCEDURE — 700101 HCHG RX REV CODE 250: Performed by: ANESTHESIOLOGY

## 2022-12-29 PROCEDURE — A9270 NON-COVERED ITEM OR SERVICE: HCPCS | Performed by: ANESTHESIOLOGY

## 2022-12-29 PROCEDURE — 160035 HCHG PACU - 1ST 60 MINS PHASE I: Performed by: OBSTETRICS & GYNECOLOGY

## 2022-12-29 PROCEDURE — 160048 HCHG OR STATISTICAL LEVEL 1-5: Performed by: OBSTETRICS & GYNECOLOGY

## 2022-12-29 PROCEDURE — 160002 HCHG RECOVERY MINUTES (STAT): Performed by: OBSTETRICS & GYNECOLOGY

## 2022-12-29 PROCEDURE — 110371 HCHG SHELL REV 272: Performed by: OBSTETRICS & GYNECOLOGY

## 2022-12-29 PROCEDURE — 86901 BLOOD TYPING SEROLOGIC RH(D): CPT

## 2022-12-29 PROCEDURE — 160009 HCHG ANES TIME/MIN: Performed by: OBSTETRICS & GYNECOLOGY

## 2022-12-29 PROCEDURE — 88302 TISSUE EXAM BY PATHOLOGIST: CPT

## 2022-12-29 PROCEDURE — 700111 HCHG RX REV CODE 636 W/ 250 OVERRIDE (IP): Performed by: ANESTHESIOLOGY

## 2022-12-29 PROCEDURE — 160046 HCHG PACU - 1ST 60 MINS PHASE II: Performed by: OBSTETRICS & GYNECOLOGY

## 2022-12-29 PROCEDURE — 00840 ANES IPER PX LOWER ABD NOS: CPT | Performed by: ANESTHESIOLOGY

## 2022-12-29 PROCEDURE — 700101 HCHG RX REV CODE 250: Performed by: OBSTETRICS & GYNECOLOGY

## 2022-12-29 PROCEDURE — 36415 COLL VENOUS BLD VENIPUNCTURE: CPT

## 2022-12-29 PROCEDURE — 160039 HCHG SURGERY MINUTES - EA ADDL 1 MIN LEVEL 3: Performed by: OBSTETRICS & GYNECOLOGY

## 2022-12-29 PROCEDURE — 86900 BLOOD TYPING SEROLOGIC ABO: CPT

## 2022-12-29 PROCEDURE — 160036 HCHG PACU - EA ADDL 30 MINS PHASE I: Performed by: OBSTETRICS & GYNECOLOGY

## 2022-12-29 PROCEDURE — 160028 HCHG SURGERY MINUTES - 1ST 30 MINS LEVEL 3: Performed by: OBSTETRICS & GYNECOLOGY

## 2022-12-29 PROCEDURE — 700102 HCHG RX REV CODE 250 W/ 637 OVERRIDE(OP): Performed by: ANESTHESIOLOGY

## 2022-12-29 PROCEDURE — 86850 RBC ANTIBODY SCREEN: CPT

## 2022-12-29 PROCEDURE — 700105 HCHG RX REV CODE 258: Performed by: OBSTETRICS & GYNECOLOGY

## 2022-12-29 PROCEDURE — 160025 RECOVERY II MINUTES (STATS): Performed by: OBSTETRICS & GYNECOLOGY

## 2022-12-29 RX ORDER — ACETAMINOPHEN 500 MG
1000 TABLET ORAL ONCE
Status: COMPLETED | OUTPATIENT
Start: 2022-12-29 | End: 2022-12-29

## 2022-12-29 RX ORDER — IBUPROFEN 800 MG/1
800 TABLET ORAL EVERY 8 HOURS PRN
Qty: 30 TABLET | Refills: 1 | Status: SHIPPED | OUTPATIENT
Start: 2022-12-29 | End: 2023-09-21

## 2022-12-29 RX ORDER — LIDOCAINE HYDROCHLORIDE AND EPINEPHRINE 10; 10 MG/ML; UG/ML
INJECTION, SOLUTION INFILTRATION; PERINEURAL
Status: DISCONTINUED | OUTPATIENT
Start: 2022-12-29 | End: 2022-12-29 | Stop reason: HOSPADM

## 2022-12-29 RX ORDER — SODIUM CHLORIDE, SODIUM LACTATE, POTASSIUM CHLORIDE, CALCIUM CHLORIDE 600; 310; 30; 20 MG/100ML; MG/100ML; MG/100ML; MG/100ML
INJECTION, SOLUTION INTRAVENOUS CONTINUOUS
Status: DISCONTINUED | OUTPATIENT
Start: 2022-12-29 | End: 2022-12-29 | Stop reason: HOSPADM

## 2022-12-29 RX ORDER — ACETAMINOPHEN 500 MG
500-1000 TABLET ORAL EVERY 6 HOURS PRN
Qty: 30 TABLET | Refills: 0 | COMMUNITY
Start: 2022-12-29 | End: 2023-09-21

## 2022-12-29 RX ORDER — ONDANSETRON 2 MG/ML
INJECTION INTRAMUSCULAR; INTRAVENOUS PRN
Status: DISCONTINUED | OUTPATIENT
Start: 2022-12-29 | End: 2022-12-29 | Stop reason: SURG

## 2022-12-29 RX ORDER — OXYCODONE HYDROCHLORIDE 5 MG/1
5 TABLET ORAL EVERY 4 HOURS PRN
Qty: 5 TABLET | Refills: 0 | Status: SHIPPED | OUTPATIENT
Start: 2022-12-29 | End: 2023-01-01

## 2022-12-29 RX ORDER — DIPHENHYDRAMINE HYDROCHLORIDE 50 MG/ML
12.5 INJECTION INTRAMUSCULAR; INTRAVENOUS
Status: DISCONTINUED | OUTPATIENT
Start: 2022-12-29 | End: 2022-12-29 | Stop reason: HOSPADM

## 2022-12-29 RX ORDER — LIDOCAINE HYDROCHLORIDE 10 MG/ML
INJECTION, SOLUTION EPIDURAL; INFILTRATION; INTRACAUDAL; PERINEURAL
Status: DISCONTINUED
Start: 2022-12-29 | End: 2022-12-29 | Stop reason: HOSPADM

## 2022-12-29 RX ORDER — KETOROLAC TROMETHAMINE 30 MG/ML
15 INJECTION, SOLUTION INTRAMUSCULAR; INTRAVENOUS ONCE
Status: COMPLETED | OUTPATIENT
Start: 2022-12-29 | End: 2022-12-29

## 2022-12-29 RX ORDER — ONDANSETRON 2 MG/ML
4 INJECTION INTRAMUSCULAR; INTRAVENOUS
Status: DISCONTINUED | OUTPATIENT
Start: 2022-12-29 | End: 2022-12-29 | Stop reason: HOSPADM

## 2022-12-29 RX ORDER — EPINEPHRINE 1 MG/ML(1)
AMPUL (ML) INJECTION
Status: DISCONTINUED
Start: 2022-12-29 | End: 2022-12-29 | Stop reason: HOSPADM

## 2022-12-29 RX ORDER — DEXAMETHASONE SODIUM PHOSPHATE 4 MG/ML
INJECTION, SOLUTION INTRA-ARTICULAR; INTRALESIONAL; INTRAMUSCULAR; INTRAVENOUS; SOFT TISSUE PRN
Status: DISCONTINUED | OUTPATIENT
Start: 2022-12-29 | End: 2022-12-29 | Stop reason: SURG

## 2022-12-29 RX ORDER — OXYCODONE HCL 5 MG/5 ML
5 SOLUTION, ORAL ORAL
Status: COMPLETED | OUTPATIENT
Start: 2022-12-29 | End: 2022-12-29

## 2022-12-29 RX ORDER — MEPERIDINE HYDROCHLORIDE 25 MG/ML
6.25 INJECTION INTRAMUSCULAR; INTRAVENOUS; SUBCUTANEOUS
Status: DISCONTINUED | OUTPATIENT
Start: 2022-12-29 | End: 2022-12-29 | Stop reason: HOSPADM

## 2022-12-29 RX ORDER — BUPIVACAINE HYDROCHLORIDE 2.5 MG/ML
INJECTION, SOLUTION EPIDURAL; INFILTRATION; INTRACAUDAL
Status: DISCONTINUED
Start: 2022-12-29 | End: 2022-12-29 | Stop reason: HOSPADM

## 2022-12-29 RX ORDER — OXYCODONE HCL 5 MG/5 ML
10 SOLUTION, ORAL ORAL
Status: COMPLETED | OUTPATIENT
Start: 2022-12-29 | End: 2022-12-29

## 2022-12-29 RX ORDER — HALOPERIDOL 5 MG/ML
1 INJECTION INTRAMUSCULAR
Status: DISCONTINUED | OUTPATIENT
Start: 2022-12-29 | End: 2022-12-29 | Stop reason: HOSPADM

## 2022-12-29 RX ORDER — ROCURONIUM BROMIDE 10 MG/ML
INJECTION, SOLUTION INTRAVENOUS PRN
Status: DISCONTINUED | OUTPATIENT
Start: 2022-12-29 | End: 2022-12-29 | Stop reason: SURG

## 2022-12-29 RX ORDER — CELECOXIB 200 MG/1
200 CAPSULE ORAL ONCE
Status: COMPLETED | OUTPATIENT
Start: 2022-12-29 | End: 2022-12-29

## 2022-12-29 RX ORDER — CEFAZOLIN SODIUM 1 G/3ML
INJECTION, POWDER, FOR SOLUTION INTRAMUSCULAR; INTRAVENOUS PRN
Status: DISCONTINUED | OUTPATIENT
Start: 2022-12-29 | End: 2022-12-29 | Stop reason: SURG

## 2022-12-29 RX ADMIN — FENTANYL CITRATE 25 MCG: 50 INJECTION, SOLUTION INTRAMUSCULAR; INTRAVENOUS at 09:19

## 2022-12-29 RX ADMIN — FENTANYL CITRATE 50 MCG: 50 INJECTION, SOLUTION INTRAMUSCULAR; INTRAVENOUS at 07:35

## 2022-12-29 RX ADMIN — OXYCODONE HYDROCHLORIDE 5 MG: 5 SOLUTION ORAL at 09:02

## 2022-12-29 RX ADMIN — KETOROLAC TROMETHAMINE 15 MG: 30 INJECTION, SOLUTION INTRAMUSCULAR; INTRAVENOUS at 08:30

## 2022-12-29 RX ADMIN — FENTANYL CITRATE 25 MCG: 50 INJECTION, SOLUTION INTRAMUSCULAR; INTRAVENOUS at 08:53

## 2022-12-29 RX ADMIN — DEXAMETHASONE SODIUM PHOSPHATE 8 MG: 4 INJECTION, SOLUTION INTRA-ARTICULAR; INTRALESIONAL; INTRAMUSCULAR; INTRAVENOUS; SOFT TISSUE at 07:44

## 2022-12-29 RX ADMIN — PROPOFOL 200 MG: 10 INJECTION, EMULSION INTRAVENOUS at 07:35

## 2022-12-29 RX ADMIN — CELECOXIB 200 MG: 200 CAPSULE ORAL at 06:46

## 2022-12-29 RX ADMIN — CEFAZOLIN 2 G: 330 INJECTION, POWDER, FOR SOLUTION INTRAMUSCULAR; INTRAVENOUS at 07:42

## 2022-12-29 RX ADMIN — ONDANSETRON 4 MG: 2 INJECTION INTRAMUSCULAR; INTRAVENOUS at 07:46

## 2022-12-29 RX ADMIN — ROCURONIUM BROMIDE 50 MG: 10 INJECTION, SOLUTION INTRAVENOUS at 07:35

## 2022-12-29 RX ADMIN — ACETAMINOPHEN 1000 MG: 500 TABLET ORAL at 06:46

## 2022-12-29 RX ADMIN — SODIUM CHLORIDE, POTASSIUM CHLORIDE, SODIUM LACTATE AND CALCIUM CHLORIDE: 600; 310; 30; 20 INJECTION, SOLUTION INTRAVENOUS at 06:58

## 2022-12-29 RX ADMIN — SUGAMMADEX 200 MG: 100 INJECTION, SOLUTION INTRAVENOUS at 08:05

## 2022-12-29 RX ADMIN — FENTANYL CITRATE 25 MCG: 50 INJECTION, SOLUTION INTRAMUSCULAR; INTRAVENOUS at 09:04

## 2022-12-29 ASSESSMENT — PAIN DESCRIPTION - PAIN TYPE
TYPE: SURGICAL PAIN;ACUTE PAIN
TYPE: SURGICAL PAIN
TYPE: SURGICAL PAIN;ACUTE PAIN
TYPE: ACUTE PAIN;SURGICAL PAIN
TYPE: SURGICAL PAIN;ACUTE PAIN

## 2022-12-29 ASSESSMENT — PAIN SCALES - GENERAL: PAIN_LEVEL: 5

## 2022-12-29 ASSESSMENT — FIBROSIS 4 INDEX: FIB4 SCORE: 0.43

## 2022-12-29 NOTE — DISCHARGE INSTRUCTIONS
If any questions arise, call your provider.  If your provider is not available, please feel free to call the Surgical Center at [phone].    MEDICATIONS: Resume taking daily medication.  Take prescribed pain medication with food.  If no medication is prescribed, you may take non-aspirin pain medication if needed.  PAIN MEDICATION CAN BE VERY CONSTIPATING.  Take a stool softener or laxative such as senokot, pericolace, or milk of magnesia if needed.    Last pain medication given at Ketorolac 15 mg IV at 8:30 am, start Ibuprofen prescription at 4:30 pm. Took Oxycodone at 9:02 am, take next dose of Oxycodone at 1 pm if needed.     Medications given before surgery:    - Acetaminophen 1000 mg by mouth given at 6:46 am, take next dose of Tylenol at 12:45 pm.   - Celebrex 200 mg by mouth given at 6:46 m    What to Expect Post Anesthesia    Rest and take it easy for the first 24 hours.  A responsible adult is recommended to remain with you during that time.  It is normal to feel sleepy.  We encourage you to not do anything that requires balance, judgment or coordination.    FOR 24 HOURS DO NOT:  Drive, operate machinery or run household appliances.  Drink beer or alcoholic beverages.  Make important decisions or sign legal documents.    To avoid nausea, slowly advance diet as tolerated, avoiding spicy or greasy foods for the first day.  Add more substantial food to your diet according to your provider's instructions.  INCREASE FLUIDS AND FIBER TO AVOID CONSTIPATION.    MILD FLU-LIKE SYMPTOMS ARE NORMAL.  YOU MAY EXPERIENCE GENERALIZED MUSCLE ACHES, THROAT IRRITATION, HEADACHE AND/OR SOME NAUSEA.

## 2022-12-29 NOTE — ANESTHESIA POSTPROCEDURE EVALUATION
Patient: Barbara David    Procedure Summary     Date: 12/29/22 Room / Location: [facility] ROOM 27 / SURGERY SAME DAY [facility]    Anesthesia Start: 0729 Anesthesia Stop: 0815    Procedure: LAPAROSCOPIC BILATERAL SALPINGECTOMY (Pelvis) Diagnosis: (REQUEST FOR STERILIZATION)    Surgeons: Katie Tripp M.D. Responsible Provider: Allie Quiroz M.D.    Anesthesia Type: general ASA Status: 2          Final Anesthesia Type: general  Last vitals  BP   Blood Pressure: 132/77    Temp   36.2 °C (97.1 °F)    Pulse   90   Resp   18    SpO2   95 %      Anesthesia Post Evaluation    Patient location during evaluation: PACU  Patient participation: complete - patient participated  Level of consciousness: awake and alert  Pain score: 5    Airway patency: patent  Anesthetic complications: no  Cardiovascular status: adequate and hemodynamically stable  Respiratory status: acceptable and room air  Hydration status: acceptable    PONV: none          No notable events documented.     Nurse Pain Score: 5 (NPRS)

## 2022-12-29 NOTE — ANESTHESIA TIME REPORT
Anesthesia Start and Stop Event Times     Date Time Event    12/29/2022 0728 Ready for Procedure     0729 Anesthesia Start     0815 Anesthesia Stop        Responsible Staff  12/29/22    Name Role Begin End    Allie Quiroz M.D. Anesth 0729 0815        Overtime Reason:  no overtime (within assigned shift)    Comments:

## 2022-12-29 NOTE — OP REPORT
Operative Report  12/29/22      PreOp Diagnosis:   Request for sterilization      PostOp Diagnosis:   S/p bilateral salpingectomy      Procedure(s):  LAPAROSCOPIC BILATERAL SALPINGECTOMY - Wound Class: Clean Contaminated    Surgeon(s):  Katie Tripp M.D.    Anesthesiologist/Type of Anesthesia:  Anesthesiologist: Allie Quiroz M.D./General    Surgical Staff:  Circulator: Nerissa Giraldo R.N.  Scrub Person: Misti Cevallos    Specimens removed if any:  ID Type Source Tests Collected by Time Destination   A : bilateral fallopian tubes Tissue Fallopian Tube PATHOLOGY SPECIMEN Katie Tripp M.D. 12/29/2022  7:57 AM        Estimated Blood Loss: 5cc  IVF: 300cc LR  UOP: 100cc    Findings: normal uterus, tubes, and ovaries    Complications: none    Procedure in Detail:  The pt was taken to the operating room where general anesthesia was initiated and she was prepped and draped in the normal sterile fashion in dorsolithotomy position using Calos stirrups.  The bladder was drained with straight catheter producing approximately 100cc of clear urine.  A sponge was placed in the vagina.  Dilute epinephrine with 1% lidocaine was used to inject the skin prior to all skin incisions, a total of approximately 10cc was used.  A 5 mm horizontal infraumbilical skin incision was made with a scalpel through the patient's prior laparoscopic scar.  A 5 mm laparoscope with Optiview trocar was advanced directly into the abdominal cavity, visualizing all layers of the abdominal wall until intraperitoneal present was assured.  The abdomen was insufflated. 5 mm left lower quadrant and midabdominal balloon trochars were placed under direct visualization.  The bilateral tubes were individually elevated and amputated using LigaSure device.  These were able to be withdrawn through the 5 mm trocar and were sent to pathology.  The bilateral mesosalpinx were examined and noted to be hemostatic.  The abdomen was desufflated, all  trochars removed, and skin was closed with 4-0 Monocryl.  Dermabond was placed.  The sponge stick was removed, the patient was awakened from general anesthesia and taken recovery in stable condition.  All counts were correct.  There were no complications.      Katie Tripp MD  [facility]

## 2022-12-29 NOTE — ANESTHESIA PREPROCEDURE EVALUATION
Case: [id number] Date/Time: 12/29/22 0715    Procedures:       LAPAROSCOPIC BILATERAL SALPINGECTOMY      SALPINGECTOMY.    Pre-op diagnosis: REQUEST FOR STERILIZATION    Location: [facility] ROOM 27 / SURGERY SAME DAY [facility]    Surgeons: Katie Tripp M.D.          Relevant Problems   No relevant active problems       Physical Exam    Airway    Cardiovascular - normal exam     Dental    Pulmonary    Abdominal    Neurological              Anesthesia Plan    ASA 2       Plan - general       Airway plan will be ETT          Induction: intravenous    Postoperative Plan: Postoperative administration of opioids is intended.    Pertinent diagnostic labs and testing reviewed    Informed Consent:    Anesthetic plan and risks discussed with patient.    Use of blood products discussed with: whom consented to blood products.

## 2022-12-29 NOTE — ANESTHESIA PROCEDURE NOTES
Airway    Date/Time: 12/29/2022 7:39 AM  Performed by: Allie Quiroz M.D.  Authorized by: Allie Quiroz M.D.     Location:  OR  Urgency:  Elective  Difficult Airway: No    Indications for Airway Management:  Anesthesia      Spontaneous Ventilation: absent    Sedation Level:  Deep  Preoxygenated: Yes    Patient Position:  Sniffing  MILS Maintained Throughout: No    Mask Difficulty Assessment:  1 - vent by mask  Final Airway Type:  Endotracheal airway  Final Endotracheal Airway:  ETT  Cuffed: Yes    Technique Used for Successful ETT Placement:  Direct laryngoscopy  Devices/Methods Used in Placement:  Intubating stylet    Insertion Site:  Oral  Blade Type:  Morales  Laryngoscope Blade/Videolaryngoscope Blade Size:  2  ETT Size (mm):  6.5  Placement Verified by: capnometry    Cormack-Lehane Classification:  Grade I - full view of glottis

## 2022-12-29 NOTE — OR NURSING
1000 To phase II from PACU. Pain tolerable 5/10, no nausea. Instructions reviewed with pt and mother. Discussed diet, activity, medications, follow up care and worsening symptoms. No questions at this time.    1026 To bathroom, able to void.     1030 Discharge orders received. Meets discharge criteria at this time. Tolerable pain. No nausea. Tolerating PO. On RA. All lines and monitors discontinued.  Pt to be discharged to home via private vehicle. Offered hospital escort and wc, pt declined, ambulated out of department with RN, family, and all belongings.

## 2023-01-29 ENCOUNTER — OFFICE VISIT (OUTPATIENT)
Dept: URGENT CARE | Facility: PHYSICIAN GROUP | Age: 28
End: 2023-01-29
Payer: COMMERCIAL

## 2023-01-29 ENCOUNTER — HOSPITAL ENCOUNTER (OUTPATIENT)
Dept: RADIOLOGY | Facility: MEDICAL CENTER | Age: 28
End: 2023-01-29
Attending: PHYSICIAN ASSISTANT
Payer: COMMERCIAL

## 2023-01-29 VITALS
SYSTOLIC BLOOD PRESSURE: 110 MMHG | HEIGHT: 64 IN | OXYGEN SATURATION: 98 % | DIASTOLIC BLOOD PRESSURE: 70 MMHG | HEART RATE: 83 BPM | RESPIRATION RATE: 16 BRPM | TEMPERATURE: 98.2 F | BODY MASS INDEX: 35.89 KG/M2 | WEIGHT: 210.2 LBS

## 2023-01-29 DIAGNOSIS — M53.3 COCCYX PAIN: ICD-10-CM
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
## 2023-01-29 LAB
APPEARANCE UR: CLEAR
BILIRUB UR STRIP-MCNC: NEGATIVE MG/DL
COLOR UR AUTO: YELLOW
GLUCOSE UR STRIP.AUTO-MCNC: NEGATIVE MG/DL
KETONES UR STRIP.AUTO-MCNC: NEGATIVE MG/DL
LEUKOCYTE ESTERASE UR QL STRIP.AUTO: NEGATIVE
NITRITE UR QL STRIP.AUTO: NEGATIVE
PH UR STRIP.AUTO: 7 [PH] (ref 5–8)
PROT UR QL STRIP: NEGATIVE MG/DL
RBC UR QL AUTO: NEGATIVE
SP GR UR STRIP.AUTO: 1.02
UROBILINOGEN UR STRIP-MCNC: 0.2 MG/DL

## 2023-01-29 PROCEDURE — 99213 OFFICE O/P EST LOW 20 MIN: CPT | Performed by: PHYSICIAN ASSISTANT

## 2023-01-29 PROCEDURE — 72220 X-RAY EXAM SACRUM TAILBONE: CPT

## 2023-01-29 PROCEDURE — 81002 URINALYSIS NONAUTO W/O SCOPE: CPT | Performed by: PHYSICIAN ASSISTANT

## 2023-01-29 RX ORDER — DOCUSATE SODIUM 100 MG/1
100 CAPSULE, LIQUID FILLED ORAL 2 TIMES DAILY
Qty: 60 CAPSULE | Refills: 0 | Status: SHIPPED | OUTPATIENT
Start: 2023-01-29 | End: 2023-02-27

## 2023-01-29 ASSESSMENT — ENCOUNTER SYMPTOMS
BACK PAIN: 1
NAUSEA: 0
CHILLS: 0
FLANK PAIN: 0
VOMITING: 0
FEVER: 0

## 2023-01-29 ASSESSMENT — FIBROSIS 4 INDEX: FIB4 SCORE: 0.43

## 2023-01-29 NOTE — LETTER
January 29, 2023       Patient: Barbara David   YOB: 1995   Date of Visit: 1/29/2023         To Whom It May Concern:    In my medical opinion, I recommend that Barbara David should be excused from work for yesterday and today; 1/28 and 1/29.      If you have any questions or concerns, please don't hesitate to call [phone]          Sincerely,          Brenden Sanders P.A.-C.  Electronically Signed

## 2023-01-29 NOTE — PROGRESS NOTES
"Subjective:   Barbara David  is a 27 y.o. female who presents for Back Pain (Fell down the stairs on Wednesday )      Back Pain  This is a new problem. The current episode started in the past 7 days. Pertinent negatives include no dysuria or fever.     Patient presents urgent care complaining of pain to tailbone following a fall in the home.  She reports being shelter down her carpeted stairs when she had a slip and fall landing in a seated position.  She notes slipping down a number of stairs thereafter.  She was able to catch her self denies other trauma or injury.  Denies head injury or loss of consciousness.  Patient complains of significant and focal tenderness over tailbone.  She denies dysuria frequency or hematuria.  She notes some urinary incontinence secondary to pain to tailbone when attempting to flex muscles and hold urine.  She complains of significant pain when attempting to have a bowel movement but has not done so thus far secondary to complaints of tailbone pain.  She denies numbness tingling or weakness to the legs.  She denies history of significant low back pain.  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  She denies symptoms associated with this historically.    Review of Systems   Constitutional:  Negative for chills and fever.   Gastrointestinal:  Negative for nausea and vomiting.   Genitourinary:  Negative for dysuria, flank pain, frequency, hematuria and urgency.        Patient reports incontinence secondary to tailbone pain.   Musculoskeletal:  Positive for back pain.   Skin:  Negative for rash.     Allergies   Allergen Reactions    Codeine Swelling        Objective:   /70 (BP Location: Right arm, Patient Position: Sitting, BP Cuff Size: Adult)   Pulse 83   Temp 36.8 °C (98.2 °F) (Temporal)   Resp 16   Ht 1.613 m (5' 3.5\")   Wt 95.3 kg (210 lb 3.2 oz)   SpO2 98%   BMI 36.65 kg/m² "     Physical Exam  Vitals and nursing note reviewed.   Constitutional:       General: She is not in acute distress.     Appearance: She is well-developed. She is not diaphoretic.   HENT:      Head: Normocephalic and atraumatic.      Right Ear: External ear normal.      Left Ear: External ear normal.      Nose: Nose normal.   Eyes:      General: Lids are normal. No scleral icterus.        Right eye: No discharge.         Left eye: No discharge.      Conjunctiva/sclera: Conjunctivae normal.   Pulmonary:      Effort: Pulmonary effort is normal. No respiratory distress.   Musculoskeletal:         General: Normal range of motion.      Cervical back: Neck supple.      Comments: Focal tenderness over coccyx and slightly left, no evident pelvic instability or tenderness over SI joints, normal resisted strength testing of bilateral lower extremities with normal +2 DTRs bilaterally, nonantalgic gait   Skin:     General: Skin is warm and dry.      Coloration: Skin is not pale.      Findings: No erythema.   Neurological:      Mental Status: She is alert and oriented to person, place, and time. She is not disoriented.   Psychiatric:         Speech: Speech normal.         Behavior: Behavior normal.   DX Sacrum and Coccyx -   1/29/2023 1:56 PM     HISTORY/REASON FOR EXAM:  Pain Following Trauma.        TECHNIQUE/EXAM DESCRIPTION AND NUMBER OF VIEWS:  3 views of the sacrum and coccyx.     COMPARISON: None.     FINDINGS:  There are no fractures or dislocations.  No blastic or lytic lesions.  The sacral neural arches are intact. There is partial sacralization of L5 on the right.     IMPRESSION:     Negative sacrum and coccyx exam.           Exam Ended: 01/29/23  2:05 PM Last Resulted: 01/29/23  2:14 PM                POCT UA - NEG / NORMAL    Assessment/Plan:   1. Coccyx pain  - DX-SACRUM AND COCCYX 2+; Future  - POCT Urinalysis  - docusate sodium (COLACE) 100 MG Cap; Take 1 Capsule by mouth 2 times a day.  Dispense: 60 Capsule;  Refill: 0  - Referral to Orthopedics    2. Fall, initial encounter  - DX-SACRUM AND COCCYX 2+; Future  Recommend conservative care, rest, ice, use seating pad, stool softener, push fluids and high-fiber diet, OTC Tylenol, sent with Rx Flexeril, stressed to them patient with any numbness weakness or true incontinence she is directed to the ER for further work-up, Ortho referral placed  Return to clinic with lack of resolution or progression of symptoms.      I have worn an N95 mask, gloves and eye protection for the entire encounter with this patient.     Differential diagnosis, natural history, supportive care, and indications for immediate follow-up discussed.

## 2023-01-29 NOTE — LETTER
January 29, 2023       Patient: Barbara David   YOB: 1995   Date of Visit: 1/29/2023         To Whom It May Concern:    In my medical opinion, I recommend that Barbara David should be excused from work for yesterday and today, 1/28 and 1/29.      If you have any questions or concerns, please don't hesitate to call [phone]          Sincerely,          Brenden Sanders P.A.-C.  Electronically Signed

## 2023-01-30 ENCOUNTER — APPOINTMENT (OUTPATIENT)
Dept: MEDICAL GROUP | Facility: MEDICAL CENTER | Age: 28
End: 2023-01-30
Payer: COMMERCIAL

## 2023-02-27 ENCOUNTER — OFFICE VISIT (OUTPATIENT)
Dept: MEDICAL GROUP | Facility: MEDICAL CENTER | Age: 28
End: 2023-02-27
Payer: COMMERCIAL

## 2023-02-27 VITALS
WEIGHT: 205 LBS | TEMPERATURE: 97.8 F | RESPIRATION RATE: 16 BRPM | DIASTOLIC BLOOD PRESSURE: 80 MMHG | OXYGEN SATURATION: 96 % | HEIGHT: 63 IN | SYSTOLIC BLOOD PRESSURE: 120 MMHG | BODY MASS INDEX: 36.32 KG/M2 | HEART RATE: 72 BPM

## 2023-02-27 DIAGNOSIS — Z86.2 HISTORY OF IRON DEFICIENCY ANEMIA: ICD-10-CM

## 2023-02-27 DIAGNOSIS — E55.9 VITAMIN D DEFICIENCY: ICD-10-CM

## 2023-02-27 DIAGNOSIS — Z00.00 ANNUAL PHYSICAL EXAM: ICD-10-CM

## 2023-02-27 DIAGNOSIS — F41.9 ANXIETY AND DEPRESSION: ICD-10-CM

## 2023-02-27 DIAGNOSIS — Z83.3 FAMILY HISTORY OF DIABETES MELLITUS: ICD-10-CM

## 2023-02-27 DIAGNOSIS — F32.A ANXIETY AND DEPRESSION: ICD-10-CM

## 2023-02-27 DIAGNOSIS — E66.9 OBESITY (BMI 30-39.9): ICD-10-CM

## 2023-02-27 PROCEDURE — 99214 OFFICE O/P EST MOD 30 MIN: CPT | Performed by: NURSE PRACTITIONER

## 2023-02-27 ASSESSMENT — FIBROSIS 4 INDEX: FIB4 SCORE: 0.44

## 2023-02-27 NOTE — LETTER
CESIA Jason.  [place] [id number] [place] [address]  Phone: [phone] - Fax: [phone]        February 27, 2023       Patient: Barbara David   YOB: 1995   Date of Visit: 2/27/2023         To Whom It May Concern:    Barbara David had a medial appointment today, 2/27/2023.     If you have any questions or concerns, please don't hesitate to call [phone]          Sincerely,          BRADY Jason  Electronically Signed

## 2023-02-27 NOTE — ASSESSMENT & PLAN NOTE
Reports difficulty losing weight, some days has high appetite other days poor appetite. Sleep has been variable, some insomnia.     Has been more stressed, has a 6 month old and 2 year old as well as a 7 year old.     Does have feelings of a depression and anxiety, no SI/HI.

## 2023-02-27 NOTE — ASSESSMENT & PLAN NOTE
Chronic, worsening. Has not been treated for this in the past. Currently 6 months post partum. History of TIFFANY during pregnancy, labs have not been repeated. Denies SI/HI.     PHQ 11  JACKY 17

## 2023-02-27 NOTE — PROGRESS NOTES
Barbara David is a 28 y.o. female here to establish care and discuss the following:    HPI:    Obesity (BMI 30-39.9)  Reports difficulty losing weight, some days has high appetite other days poor appetite. Sleep has been variable, some insomnia.     Has been more stressed, has a 6 month old and 2 year old as well as a 7 year old.     Does have feelings of a depression and anxiety, no SI/HI.     Anxiety and depression  Chronic, worsening. Has not been treated for this in the past. Currently 6 months post partum. History of TIFFANY during pregnancy, labs have not been repeated. Denies SI/HI.     PHQ 11  JACKY 17  Current medicines (including changes today)  Current Outpatient Medications   Medication Sig Dispense Refill    ibuprofen (MOTRIN) 800 MG Tab Take 1 Tablet by mouth every 8 hours as needed for Moderate Pain. 30 Tablet 1    acetaminophen (TYLENOL) 500 MG Tab Take 1-2 Tablets by mouth every 6 hours as needed for Mild Pain. 30 Tablet 0     No current facility-administered medications for this visit.     She  has a past medical history of Family history of diabetes mellitus (DM) (06/02/2016), Gynecological disorder, Late prenatal care-1st visit at 22wks (01/07/2015), and Urinary tract infection, site not specified (01/01/2014).  She  has a past surgical history that includes ovarian cystectomy (Right, 08/10/2020) and pr lap,diagnostic abdomen (N/A, 12/29/2022).  Social History     Tobacco Use    Smoking status: Never    Smokeless tobacco: Never   Vaping Use    Vaping Use: Some days    Last attempt to quit: 5/20/2011    Substances: THC    Devices: Disposable   Substance Use Topics    Alcohol use: Not Currently    Drug use: Yes     Types: Inhaled, Marijuana     Comment: insomnia, stress, occasional     Social History     Social History Narrative    Not on file     Family History   Problem Relation Age of Onset    No Known Problems Mother     Diabetes Father     Diabetes Paternal Aunt     Heart Disease  "Maternal Grandfather     Alcohol/Drug Paternal Grandfather     Diabetes Paternal Grandfather     Stroke Neg Hx     Cancer Neg Hx     Lung Disease Neg Hx     Hypertension Neg Hx      Family Status   Relation Name Status    Mo  Alive    Fa  Alive    Sis  Alive    Sis  Alive    Bro  Alive    Bro  Alive    Bro  Alive    PAunt  (Not Specified)    MGMo  Alive    MGFa  Alive    PGMo  Alive    PGFa      Neg Hx  (Not Specified)         ROS  No chest pain, no abdominal pain, no rash.  Positive ROS as per HPI.  All other systems reviewed and are negative      Objective:     /80 (Patient Position: Sitting)   Pulse 72   Temp 36.6 °C (97.8 °F) (Temporal)   Resp 16   Ht 1.6 m (5' 3\")   Wt 93 kg (205 lb)   SpO2 96%  Body mass index is 36.31 kg/m².  Physical Exam:    Constitutional: Alert, no distress.  Skin: Warm, dry, good turgor, no rashes in visible areas.  Eye: Equal, round and reactive, conjunctiva clear, lids normal.  ENMT: Lips without lesions, good dentition, oropharynx clear.  Neck: Trachea midline, no masses, no thyromegaly. No cervical or supraclavicular lymphadenopathy.  Respiratory: Unlabored respiratory effort, lungs clear to auscultation, no wheezes, no ronchi.  Cardiovascular: Normal S1, S2, no murmur, no edema.  Abdomen: Soft, non-tender, no masses, no hepatosplenomegaly.  Psych: Alert and oriented x3, normal affect and mood.      Assessment and Plan:   The following treatment plan was discussed    1. Obesity (BMI 30-39.9)  Unstable  Check labs  Discussed lifestyle and medication treatment options  Will review baseline labs and diet/activity in depth prior to medication being started    2. History of iron deficiency anemia  Unstable, anemic during pregnancy, now 6 months post partum, repeat labs  - IRON/TOTAL IRON BIND; Future  - FERRITIN; Future    3. Family history of diabetes mellitus (DM)  - HEMOGLOBIN A1C; Future    4. Vitamin D deficiency  - VITAMIN D,25 HYDROXY (DEFICIENCY); Future    5. " Annual physical exam  - CBC WITH DIFFERENTIAL; Future  - Comp Metabolic Panel; Future  - HEMOGLOBIN A1C; Future  - Lipid Profile; Future  - TSH WITH REFLEX TO FT4; Future  - VITAMIN D,25 HYDROXY (DEFICIENCY); Future  - IRON/TOTAL IRON BIND; Future  - FERRITIN; Future    6. Anxiety and depression  Unstable  Check labs to eval for metabolic/endocrine dysfunction or nutrient deficiency as underlying cause      Records requested.  Followup: Return in about 4 weeks (around 3/27/2023) for Lab Review, Weight Management.    The MA is performing the above orders under the direction of Dr. Giraldo    Please note that this dictation was created using voice recognition software. I have made every reasonable attempt to correct obvious errors, but I expect that there are errors of grammar and possibly content that I did not discover before finalizing the note.

## 2023-03-27 ENCOUNTER — HOSPITAL ENCOUNTER (OUTPATIENT)
Dept: LAB | Facility: MEDICAL CENTER | Age: 28
End: 2023-03-27
Attending: NURSE PRACTITIONER
Payer: COMMERCIAL

## 2023-03-27 DIAGNOSIS — Z00.00 ANNUAL PHYSICAL EXAM: ICD-10-CM

## 2023-03-27 DIAGNOSIS — Z83.3 FAMILY HISTORY OF DIABETES MELLITUS: ICD-10-CM

## 2023-03-27 DIAGNOSIS — Z86.2 HISTORY OF IRON DEFICIENCY ANEMIA: ICD-10-CM

## 2023-03-27 DIAGNOSIS — E55.9 VITAMIN D DEFICIENCY: ICD-10-CM

## 2023-03-27 LAB
25(OH)D3 SERPL-MCNC: 8 NG/ML (ref 30–100)
ALBUMIN SERPL BCP-MCNC: 4.3 G/DL (ref 3.2–4.9)
ALBUMIN/GLOB SERPL: 1.2 G/DL
ALP SERPL-CCNC: 137 U/L (ref 30–99)
ALT SERPL-CCNC: 22 U/L (ref 2–50)
ANION GAP SERPL CALC-SCNC: 12 MMOL/L (ref 7–16)
ANISOCYTOSIS BLD QL SMEAR: ABNORMAL
AST SERPL-CCNC: 22 U/L (ref 12–45)
BASOPHILS # BLD AUTO: 0.2 % (ref 0–1.8)
BASOPHILS # BLD: 0.02 K/UL (ref 0–0.12)
BILIRUB SERPL-MCNC: 0.3 MG/DL (ref 0.1–1.5)
BUN SERPL-MCNC: 7 MG/DL (ref 8–22)
CALCIUM ALBUM COR SERPL-MCNC: 8.9 MG/DL (ref 8.5–10.5)
CALCIUM SERPL-MCNC: 9.1 MG/DL (ref 8.5–10.5)
CHLORIDE SERPL-SCNC: 101 MMOL/L (ref 96–112)
CHOLEST SERPL-MCNC: 205 MG/DL (ref 100–199)
CO2 SERPL-SCNC: 22 MMOL/L (ref 20–33)
COMMENT 1642: NORMAL
CREAT SERPL-MCNC: 0.63 MG/DL (ref 0.5–1.4)
EOSINOPHIL # BLD AUTO: 0.08 K/UL (ref 0–0.51)
EOSINOPHIL NFR BLD: 0.8 % (ref 0–6.9)
ERYTHROCYTE [DISTWIDTH] IN BLOOD BY AUTOMATED COUNT: 46 FL (ref 35.9–50)
EST. AVERAGE GLUCOSE BLD GHB EST-MCNC: 105 MG/DL
FERRITIN SERPL-MCNC: 20.2 NG/ML (ref 10–291)
GFR SERPLBLD CREATININE-BSD FMLA CKD-EPI: 124 ML/MIN/1.73 M 2
GLOBULIN SER CALC-MCNC: 3.5 G/DL (ref 1.9–3.5)
GLUCOSE SERPL-MCNC: 89 MG/DL (ref 65–99)
HBA1C MFR BLD: 5.3 % (ref 4–5.6)
HCT VFR BLD AUTO: 39.7 % (ref 37–47)
HDLC SERPL-MCNC: 52 MG/DL
HGB BLD-MCNC: 11.8 G/DL (ref 12–16)
IMM GRANULOCYTES # BLD AUTO: 0.03 K/UL (ref 0–0.11)
IMM GRANULOCYTES NFR BLD AUTO: 0.3 % (ref 0–0.9)
IRON SATN MFR SERPL: 8 % (ref 15–55)
IRON SERPL-MCNC: 30 UG/DL (ref 40–170)
LDLC SERPL CALC-MCNC: 114 MG/DL
LYMPHOCYTES # BLD AUTO: 2.85 K/UL (ref 1–4.8)
LYMPHOCYTES NFR BLD: 29.1 % (ref 22–41)
MCH RBC QN AUTO: 23.3 PG (ref 27–33)
MCHC RBC AUTO-ENTMCNC: 29.7 G/DL (ref 33.6–35)
MCV RBC AUTO: 78.5 FL (ref 81.4–97.8)
MICROCYTES BLD QL SMEAR: ABNORMAL
MONOCYTES # BLD AUTO: 0.48 K/UL (ref 0–0.85)
MONOCYTES NFR BLD AUTO: 4.9 % (ref 0–13.4)
MORPHOLOGY BLD-IMP: NORMAL
NEUTROPHILS # BLD AUTO: 6.33 K/UL (ref 2–7.15)
NEUTROPHILS NFR BLD: 64.7 % (ref 44–72)
NRBC # BLD AUTO: 0 K/UL
NRBC BLD-RTO: 0 /100 WBC
OVALOCYTES BLD QL SMEAR: NORMAL
PLATELET # BLD AUTO: 299 K/UL (ref 164–446)
PLATELET BLD QL SMEAR: NORMAL
PMV BLD AUTO: 11.1 FL (ref 9–12.9)
POIKILOCYTOSIS BLD QL SMEAR: NORMAL
POLYCHROMASIA BLD QL SMEAR: NORMAL
POTASSIUM SERPL-SCNC: 4.5 MMOL/L (ref 3.6–5.5)
PROT SERPL-MCNC: 7.8 G/DL (ref 6–8.2)
RBC # BLD AUTO: 5.06 M/UL (ref 4.2–5.4)
RBC BLD AUTO: PRESENT
SODIUM SERPL-SCNC: 135 MMOL/L (ref 135–145)
TIBC SERPL-MCNC: 397 UG/DL (ref 250–450)
TRIGL SERPL-MCNC: 193 MG/DL (ref 0–149)
TSH SERPL DL<=0.005 MIU/L-ACNC: 2.13 UIU/ML (ref 0.38–5.33)
UIBC SERPL-MCNC: 367 UG/DL (ref 110–370)
WBC # BLD AUTO: 9.8 K/UL (ref 4.8–10.8)

## 2023-03-27 PROCEDURE — 83550 IRON BINDING TEST: CPT

## 2023-03-27 PROCEDURE — 83540 ASSAY OF IRON: CPT

## 2023-03-27 PROCEDURE — 36415 COLL VENOUS BLD VENIPUNCTURE: CPT

## 2023-03-27 PROCEDURE — 83036 HEMOGLOBIN GLYCOSYLATED A1C: CPT

## 2023-03-27 PROCEDURE — 80053 COMPREHEN METABOLIC PANEL: CPT

## 2023-03-27 PROCEDURE — 82728 ASSAY OF FERRITIN: CPT

## 2023-03-27 PROCEDURE — 82306 VITAMIN D 25 HYDROXY: CPT

## 2023-03-27 PROCEDURE — 84443 ASSAY THYROID STIM HORMONE: CPT

## 2023-03-27 PROCEDURE — 80061 LIPID PANEL: CPT

## 2023-03-27 PROCEDURE — 85025 COMPLETE CBC W/AUTO DIFF WBC: CPT

## 2023-04-19 ENCOUNTER — OFFICE VISIT (OUTPATIENT)
Dept: MEDICAL GROUP | Facility: MEDICAL CENTER | Age: 28
End: 2023-04-19
Payer: COMMERCIAL

## 2023-04-19 VITALS
RESPIRATION RATE: 16 BRPM | TEMPERATURE: 98.3 F | DIASTOLIC BLOOD PRESSURE: 68 MMHG | SYSTOLIC BLOOD PRESSURE: 112 MMHG | HEIGHT: 63 IN | WEIGHT: 217 LBS | HEART RATE: 75 BPM | BODY MASS INDEX: 38.45 KG/M2 | OXYGEN SATURATION: 99 %

## 2023-04-19 DIAGNOSIS — F41.9 ANXIETY AND DEPRESSION: ICD-10-CM

## 2023-04-19 DIAGNOSIS — D50.8 IRON DEFICIENCY ANEMIA SECONDARY TO INADEQUATE DIETARY IRON INTAKE: ICD-10-CM

## 2023-04-19 DIAGNOSIS — F32.A ANXIETY AND DEPRESSION: ICD-10-CM

## 2023-04-19 DIAGNOSIS — E66.9 OBESITY (BMI 30-39.9): ICD-10-CM

## 2023-04-19 DIAGNOSIS — E55.9 VITAMIN D DEFICIENCY: ICD-10-CM

## 2023-04-19 PROCEDURE — 99214 OFFICE O/P EST MOD 30 MIN: CPT | Performed by: NURSE PRACTITIONER

## 2023-04-19 RX ORDER — ERGOCALCIFEROL 1.25 MG/1
50000 CAPSULE ORAL
Qty: 12 CAPSULE | Refills: 0 | Status: SHIPPED | OUTPATIENT
Start: 2023-04-19 | End: 2023-09-21

## 2023-04-19 ASSESSMENT — PATIENT HEALTH QUESTIONNAIRE - PHQ9
5. POOR APPETITE OR OVEREATING: 2 - MORE THAN HALF THE DAYS
CLINICAL INTERPRETATION OF PHQ2 SCORE: 5
SUM OF ALL RESPONSES TO PHQ QUESTIONS 1-9: 21

## 2023-04-19 ASSESSMENT — ANXIETY QUESTIONNAIRES
7. FEELING AFRAID AS IF SOMETHING AWFUL MIGHT HAPPEN: MORE THAN HALF THE DAYS
GAD7 TOTAL SCORE: 19
1. FEELING NERVOUS, ANXIOUS, OR ON EDGE: NEARLY EVERY DAY
2. NOT BEING ABLE TO STOP OR CONTROL WORRYING: NEARLY EVERY DAY
6. BECOMING EASILY ANNOYED OR IRRITABLE: NEARLY EVERY DAY
3. WORRYING TOO MUCH ABOUT DIFFERENT THINGS: NEARLY EVERY DAY
IF YOU CHECKED OFF ANY PROBLEMS ON THIS QUESTIONNAIRE, HOW DIFFICULT HAVE THESE PROBLEMS MADE IT FOR YOU TO DO YOUR WORK, TAKE CARE OF THINGS AT HOME, OR GET ALONG WITH OTHER PEOPLE: VERY DIFFICULT
4. TROUBLE RELAXING: NEARLY EVERY DAY
5. BEING SO RESTLESS THAT IT IS HARD TO SIT STILL: MORE THAN HALF THE DAYS

## 2023-04-19 ASSESSMENT — FIBROSIS 4 INDEX: FIB4 SCORE: 0.44

## 2023-04-19 NOTE — ASSESSMENT & PLAN NOTE
"Chronic, worsening. Has not been treated for this in the past. Currently 8 months post partum. Denies SI/HI. PHQ and JACKY worsened. Would like to see a therapist, doesn't want to start medication at this time. Unable to talk with her  about this as he \"doesn't believe in depression\".     PHQ  21  JACKY 19  "

## 2023-04-19 NOTE — PATIENT INSTRUCTIONS
Gentle Iron everyday with Vitamin C supplement, don't take with Calcium containing food or vitamin.

## 2023-04-19 NOTE — ASSESSMENT & PLAN NOTE
New diagnosis today. Does report heavy menses since birth of her daughter 8 months ago. Had anemia with first daughter. Not taking iron supplement.      Latest Reference Range & Units 03/27/23 13:23   Iron 40 - 170 ug/dL 30 (L)   Total Iron Binding 250 - 450 ug/dL 397   % Saturation 15 - 55 % 8 (L)   Unsat Iron Binding 110 - 370 ug/dL 367   (L): Data is abnormally low     Latest Reference Range & Units 03/27/23 13:23   Ferritin 10.0 - 291.0 ng/mL 20.2

## 2023-04-19 NOTE — ASSESSMENT & PLAN NOTE
Reports difficulty losing weight, some days has high appetite other days poor appetite. Sleep has been variable, some insomnia.     Has been more stressed, has a 8 month old and 2 year old as well as a 7 year old.      Does struggle with depression and anxiety, no SI/HI.     Would like to discuss weight loss medications to get her health in order while she is working on diet and exercise.

## 2023-04-19 NOTE — PROGRESS NOTES
"Subjective:   Barbara David is a 28 y.o. female here today for lab review, anxiety/depression weight management    Anxiety and depression  Chronic, worsening. Has not been treated for this in the past. Currently 8 months post partum. Denies SI/HI. PHQ and JACKY worsened. Would like to see a therapist, doesn't want to start medication at this time. Unable to talk with her  about this as he \"doesn't believe in depression\".     PHQ  21  JACKY 19    Iron deficiency anemia secondary to inadequate dietary iron intake  New diagnosis today. Does report heavy menses since birth of her daughter 8 months ago. Had anemia with first daughter. Not taking iron supplement.      Latest Reference Range & Units 03/27/23 13:23   Iron 40 - 170 ug/dL 30 (L)   Total Iron Binding 250 - 450 ug/dL 397   % Saturation 15 - 55 % 8 (L)   Unsat Iron Binding 110 - 370 ug/dL 367   (L): Data is abnormally low     Latest Reference Range & Units 03/27/23 13:23   Ferritin 10.0 - 291.0 ng/mL 20.2       Obesity (BMI 30-39.9)  Reports difficulty losing weight, some days has high appetite other days poor appetite. Sleep has been variable, some insomnia.     Has been more stressed, has a 8 month old and 2 year old as well as a 7 year old.      Does struggle with depression and anxiety, no SI/HI.     Would like to discuss weight loss medications to get her health in order while she is working on diet and exercise.        Current medicines (including changes today)  Current Outpatient Medications   Medication Sig Dispense Refill    ergocalciferol (DRISDOL) 39344 UNIT capsule Take 1 Capsule by mouth every 7 days. 12 Capsule 0    Semaglutide,0.25 or 0.5MG/DOS, 2 MG/1.5ML Solution Pen-injector Inject 0.25 mg under the skin every 7 days. 1.5 mL 3    ibuprofen (MOTRIN) 800 MG Tab Take 1 Tablet by mouth every 8 hours as needed for Moderate Pain. 30 Tablet 1    acetaminophen (TYLENOL) 500 MG Tab Take 1-2 Tablets by mouth every 6 hours as needed " "for Mild Pain. 30 Tablet 0     No current facility-administered medications for this visit.     She  has a past medical history of Family history of diabetes mellitus (DM) (06/02/2016), Gynecological disorder, Late prenatal care-1st visit at 22wks (01/07/2015), and Urinary tract infection, site not specified (01/01/2014).    ROS   No chest pain, no shortness of breath, no abdominal pain  Positive ROS as per HPI.  All other systems reviewed and are negative.     Objective:     /68 (Patient Position: Sitting)   Pulse 75   Temp 36.8 °C (98.3 °F) (Temporal)   Resp 16   Ht 1.6 m (5' 3\")   Wt 98.4 kg (217 lb)   SpO2 99%  Body mass index is 38.44 kg/m².     Physical Exam:  Constitutional: Alert, no distress.  Skin: Warm, dry, good turgor, no rashes in visible areas.  Eye: Equal, round and reactive, conjunctiva clear, lids normal.  ENMT: Mask in place  Respiratory: Unlabored respiratory effort  Psych: Alert and oriented x3, normal affect and mood.        Assessment and Plan:   The following treatment plan was discussed    1. Anxiety and depression  Unstable  Declines medication treatment today  Denies SI  Follow up with [facility]  - Referral to Behavioral Health    2. Iron deficiency anemia secondary to inadequate dietary iron intake  Unstable  Start gentle iron daily for 3 months, then repeat labs.   - IRON/TOTAL IRON BIND; Future  - FERRITIN; Future  - CBC WITH DIFFERENTIAL; Future    3. Vitamin D deficiency  Unstable  D2 weekly for 3 months, then D3 daily  - ergocalciferol (DRISDOL) 69538 UNIT capsule; Take 1 Capsule by mouth every 7 days.  Dispense: 12 Capsule; Refill: 0    4. Obesity (BMI 30-39.9)  Unstable  Continue with healthy diet changes, increase exercise  Trial ozempic 0.25 mg weekly. If too expensive we will change to phentermine   - Semaglutide,0.25 or 0.5MG/DOS, 2 MG/1.5ML Solution Pen-injector; Inject 0.25 mg under the skin every 7 days.  Dispense: 1.5 mL; Refill: 3      Followup: Return in " about 4 weeks (around 5/17/2023) for Weight Management, Depression/Anxiety.    The MA is performing the above orders under the direction of Dr. Giraldo    Please note that this dictation was created using voice recognition software. I have made every reasonable attempt to correct obvious errors, but I expect that there are errors of grammar and possibly content that I did not discover before finalizing the note.

## 2023-04-19 NOTE — LETTER
CESIA Jason.  [facility]   [address] [facility] [place] [address]  Phone: [phone] - Fax: [phone]          April 19, 2023       Patient: Barbara David   YOB: 1995   Date of Visit: 4/19/2023         To Whom It May Concern:    Barbara David is under my care for depression and anxiety. Please excuse her from work on 4/19/2023, she had a medical appointment. She may miss work periodically as well due to flares in her depression,anxiety.     If you have any questions or concerns, please don't hesitate to call [phone]          Sincerely,          BRADY Jason  Electronically Signed

## 2023-04-24 ENCOUNTER — PATIENT MESSAGE (OUTPATIENT)
Dept: MEDICAL GROUP | Facility: MEDICAL CENTER | Age: 28
End: 2023-04-24
Payer: COMMERCIAL

## 2023-04-24 DIAGNOSIS — Z83.3 FAMILY HISTORY OF DIABETES MELLITUS: ICD-10-CM

## 2023-04-24 DIAGNOSIS — E66.9 OBESITY (BMI 30-39.9): ICD-10-CM

## 2023-05-02 RX ORDER — PHENTERMINE HYDROCHLORIDE 37.5 MG/1
37.5 CAPSULE ORAL EVERY MORNING
Qty: 30 CAPSULE | Refills: 0 | Status: SHIPPED | OUTPATIENT
Start: 2023-05-02 | End: 2023-06-09 | Stop reason: SDUPTHER

## 2023-06-09 ENCOUNTER — OFFICE VISIT (OUTPATIENT)
Dept: MEDICAL GROUP | Facility: MEDICAL CENTER | Age: 28
End: 2023-06-09
Payer: COMMERCIAL

## 2023-06-09 VITALS
TEMPERATURE: 97.1 F | WEIGHT: 203 LBS | OXYGEN SATURATION: 96 % | HEART RATE: 66 BPM | HEIGHT: 63 IN | DIASTOLIC BLOOD PRESSURE: 78 MMHG | SYSTOLIC BLOOD PRESSURE: 112 MMHG | BODY MASS INDEX: 35.97 KG/M2

## 2023-06-09 DIAGNOSIS — Z83.3 FAMILY HISTORY OF DIABETES MELLITUS: ICD-10-CM

## 2023-06-09 DIAGNOSIS — E66.9 OBESITY (BMI 30-39.9): ICD-10-CM

## 2023-06-09 PROCEDURE — 3074F SYST BP LT 130 MM HG: CPT | Performed by: NURSE PRACTITIONER

## 2023-06-09 PROCEDURE — 99214 OFFICE O/P EST MOD 30 MIN: CPT | Performed by: NURSE PRACTITIONER

## 2023-06-09 PROCEDURE — 3078F DIAST BP <80 MM HG: CPT | Performed by: NURSE PRACTITIONER

## 2023-06-09 RX ORDER — PHENTERMINE HYDROCHLORIDE 37.5 MG/1
37.5 CAPSULE ORAL EVERY MORNING
Qty: 30 CAPSULE | Refills: 0 | Status: SHIPPED | OUTPATIENT
Start: 2023-06-09 | End: 2023-07-12 | Stop reason: SDUPTHER

## 2023-06-09 ASSESSMENT — FIBROSIS 4 INDEX: FIB4 SCORE: 0.44

## 2023-06-09 NOTE — ASSESSMENT & PLAN NOTE
Started on phentermine daily about 6 weeks ago. Going well, has lost about 17 pounds. Has been eating more greens and protein. Going on more walks, hasn't started more moderate intensity exercise yet.     Denies insomnia, anxiety, chest pain.

## 2023-06-09 NOTE — PROGRESS NOTES
"Subjective:   Barbara David is a 28 y.o. female here today for weight management    Obesity (BMI 30-39.9)  Started on phentermine daily about 6 weeks ago. Going well, has lost about 17 pounds. Has been eating more greens and protein. Going on more walks, hasn't started more moderate intensity exercise yet.     Denies insomnia, anxiety, chest pain.      Current medicines (including changes today)  Current Outpatient Medications   Medication Sig Dispense Refill    phentermine 37.5 MG capsule Take 1 Capsule by mouth every morning for 30 days. 30 Capsule 0    ergocalciferol (DRISDOL) 38395 UNIT capsule Take 1 Capsule by mouth every 7 days. 12 Capsule 0    Semaglutide,0.25 or 0.5MG/DOS, 2 MG/1.5ML Solution Pen-injector Inject 0.25 mg under the skin every 7 days. 1.5 mL 3    ibuprofen (MOTRIN) 800 MG Tab Take 1 Tablet by mouth every 8 hours as needed for Moderate Pain. 30 Tablet 1    acetaminophen (TYLENOL) 500 MG Tab Take 1-2 Tablets by mouth every 6 hours as needed for Mild Pain. 30 Tablet 0     No current facility-administered medications for this visit.     She  has a past medical history of Family history of diabetes mellitus (DM) (06/02/2016), Gynecological disorder, Late prenatal care-1st visit at 22wks (01/07/2015), and Urinary tract infection, site not specified (01/01/2014).    ROS   No chest pain, no shortness of breath, no abdominal pain  Positive ROS as per HPI.  All other systems reviewed and are negative.     Objective:     /78   Pulse 66   Temp 36.2 °C (97.1 °F) (Temporal)   Ht 1.6 m (5' 3\")   Wt 92.1 kg (203 lb)   SpO2 96%  Body mass index is 35.96 kg/m².     Physical Exam:  Constitutional: Alert, no distress.  Skin: Warm, dry, good turgor, no rashes in visible areas.  Eye: Equal, round and reactive, conjunctiva clear, lids normal.  ENMT: Lips without lesions, good dentition  Respiratory: Unlabored respiratory effort, lungs clear to auscultation, no wheezes, no " dane.  Cardiovascular: Normal S1, S2, no murmur, no edema.  Psych: Alert and oriented x3, normal affect and mood.        Assessment and Plan:   The following treatment plan was discussed    1. Obesity (BMI 30-39.9)  Unstable, improving  Continue phentermine daily, refilled  Increase exercise, continue with healthy diet  - phentermine 37.5 MG capsule; Take 1 Capsule by mouth every morning for 30 days.  Dispense: 30 Capsule; Refill: 0    2. Family history of diabetes mellitus (DM)  - phentermine 37.5 MG capsule; Take 1 Capsule by mouth every morning for 30 days.  Dispense: 30 Capsule; Refill: 0      Followup: Return in about 4 weeks (around 7/7/2023) for Weight Management.    The MA is performing the above orders under the direction of Dr. Giraldo    Please note that this dictation was created using voice recognition software. I have made every reasonable attempt to correct obvious errors, but I expect that there are errors of grammar and possibly content that I did not discover before finalizing the note.

## 2023-07-12 ENCOUNTER — OFFICE VISIT (OUTPATIENT)
Dept: MEDICAL GROUP | Facility: MEDICAL CENTER | Age: 28
End: 2023-07-12
Payer: COMMERCIAL

## 2023-07-12 VITALS
HEIGHT: 63 IN | DIASTOLIC BLOOD PRESSURE: 66 MMHG | BODY MASS INDEX: 34.73 KG/M2 | TEMPERATURE: 97.6 F | OXYGEN SATURATION: 100 % | HEART RATE: 88 BPM | SYSTOLIC BLOOD PRESSURE: 106 MMHG | WEIGHT: 196 LBS

## 2023-07-12 DIAGNOSIS — E66.9 OBESITY (BMI 30-39.9): ICD-10-CM

## 2023-07-12 DIAGNOSIS — N92.6 MISSED MENSES: ICD-10-CM

## 2023-07-12 DIAGNOSIS — Z83.3 FAMILY HISTORY OF DIABETES MELLITUS: ICD-10-CM

## 2023-07-12 LAB
POCT INT CON NEG: NEGATIVE
POCT INT CON POS: POSITIVE
POCT URINE PREGNANCY TEST: NEGATIVE

## 2023-07-12 PROCEDURE — 3074F SYST BP LT 130 MM HG: CPT | Performed by: NURSE PRACTITIONER

## 2023-07-12 PROCEDURE — 99214 OFFICE O/P EST MOD 30 MIN: CPT | Performed by: NURSE PRACTITIONER

## 2023-07-12 PROCEDURE — 81025 URINE PREGNANCY TEST: CPT | Performed by: NURSE PRACTITIONER

## 2023-07-12 PROCEDURE — 3078F DIAST BP <80 MM HG: CPT | Performed by: NURSE PRACTITIONER

## 2023-07-12 RX ORDER — PHENTERMINE HYDROCHLORIDE 37.5 MG/1
37.5 CAPSULE ORAL EVERY MORNING
Qty: 30 CAPSULE | Refills: 0 | Status: SHIPPED | OUTPATIENT
Start: 2023-07-12 | End: 2023-08-11

## 2023-07-12 ASSESSMENT — FIBROSIS 4 INDEX: FIB4 SCORE: 0.44

## 2023-07-12 NOTE — ASSESSMENT & PLAN NOTE
Started on phentermine daily about 6 weeks ago. Going well, has lost about 24 pounds. Has been eating more greens and protein. Going on more walks, hasn't started more moderate intensity exercise yet.     Denies insomnia, anxiety, chest pain.

## 2023-07-12 NOTE — ASSESSMENT & PLAN NOTE
1 week late, she is normally very regular. Had tubal ligation. Has been on phentermine and losing weight lately. Recent labs in March unremarkable including thyroid.

## 2023-08-10 NOTE — PROGRESS NOTES
"Subjective:   Barbara David is a 28 y.o. female here today for weight management    Obesity (BMI 30-39.9)  Started on phentermine daily about 6 weeks ago. Going well, has lost about 24 pounds. Has been eating more greens and protein. Going on more walks, hasn't started more moderate intensity exercise yet.     Denies insomnia, anxiety, chest pain.     Missed menses  1 week late, she is normally very regular. Had tubal ligation. Has been on phentermine and losing weight lately. Recent labs in March unremarkable including thyroid.      Current medicines (including changes today)  Current Outpatient Medications   Medication Sig Dispense Refill    phentermine 37.5 MG capsule Take 1 Capsule by mouth every morning for 30 days. 30 Capsule 0    ergocalciferol (DRISDOL) 34108 UNIT capsule Take 1 Capsule by mouth every 7 days. 12 Capsule 0    ibuprofen (MOTRIN) 800 MG Tab Take 1 Tablet by mouth every 8 hours as needed for Moderate Pain. 30 Tablet 1    acetaminophen (TYLENOL) 500 MG Tab Take 1-2 Tablets by mouth every 6 hours as needed for Mild Pain. 30 Tablet 0     No current facility-administered medications for this visit.     She  has a past medical history of Family history of diabetes mellitus (DM) (06/02/2016), Gynecological disorder, Late prenatal care-1st visit at 22wks (01/07/2015), and Urinary tract infection, site not specified (01/01/2014).    ROS   No chest pain, no shortness of breath, no abdominal pain  Positive ROS as per HPI.  All other systems reviewed and are negative.     Objective:     /66   Pulse 88   Temp 36.4 °C (97.6 °F) (Temporal)   Ht 1.6 m (5' 3\")   Wt 88.9 kg (196 lb)   SpO2 100%  Body mass index is 34.72 kg/m².     Physical Exam:  Constitutional: Alert, no distress.  Skin: Warm, dry, good turgor, no rashes in visible areas.  Eye: Equal, round and reactive, conjunctiva clear, lids normal.  ENMT: Lips without lesions, good dentition  Respiratory: Unlabored respiratory " effort  Cardiovascular: Normal S1, S2, no murmur, no edema.  Psych: Alert and oriented x3, normal affect and mood.        Assessment and Plan:   The following treatment plan was discussed    1. Obesity (BMI 30-39.9)  Unstable, improving  Continue phentermine daily, refill x 1 month   reviewed and consistent, UDS up-to-date, contract up-to-date  - phentermine 37.5 MG capsule; Take 1 Capsule by mouth every morning for 30 days.  Dispense: 30 Capsule; Refill: 0    2. Family history of diabetes mellitus (DM)  - phentermine 37.5 MG capsule; Take 1 Capsule by mouth every morning for 30 days.  Dispense: 30 Capsule; Refill: 0    3. Missed menses  Pregnancy test negative, possibly due to weight loss, phentermine use  - POCT Pregnancy      Followup: Return in about 4 weeks (around 8/9/2023) for Weight Management.    The MA is performing the above orders under the direction of Dr. Giraldo    Please note that this dictation was created using voice recognition software. I have made every reasonable attempt to correct obvious errors, but I expect that there are errors of grammar and possibly content that I did not discover before finalizing the note.

## 2023-09-21 ENCOUNTER — OFFICE VISIT (OUTPATIENT)
Dept: MEDICAL GROUP | Facility: CLINIC | Age: 28
End: 2023-09-21
Payer: MEDICAID

## 2023-09-21 VITALS
BODY MASS INDEX: 33.31 KG/M2 | OXYGEN SATURATION: 97 % | HEIGHT: 63 IN | HEART RATE: 78 BPM | TEMPERATURE: 98 F | WEIGHT: 188 LBS | SYSTOLIC BLOOD PRESSURE: 122 MMHG | DIASTOLIC BLOOD PRESSURE: 78 MMHG

## 2023-09-21 PROCEDURE — 99203 OFFICE O/P NEW LOW 30 MIN: CPT | Mod: GE

## 2023-09-21 PROCEDURE — 3074F SYST BP LT 130 MM HG: CPT

## 2023-09-21 PROCEDURE — 3078F DIAST BP <80 MM HG: CPT

## 2023-09-21 RX ORDER — PHENTERMINE HYDROCHLORIDE 37.5 MG/1
37.5 CAPSULE ORAL EVERY MORNING
Qty: 90 CAPSULE | Refills: 0 | Status: SHIPPED | OUTPATIENT
Start: 2023-09-21 | End: 2023-12-20

## 2023-09-21 ASSESSMENT — FIBROSIS 4 INDEX: FIB4 SCORE: 0.44

## 2023-09-21 NOTE — ASSESSMENT & PLAN NOTE
Afebrile, vital signs stable.  BMI 33.  Patient was prescribed phentermine 37.5 mg daily by previous provider.  Patient has had significant success with this medication and weight loss.  PE: Cardio regular rate and rhythm no murmur appreciated, pulm clear to auscultation bilaterally no wheezing appreciated.    Plan  - Prescribed phentermine 37.5 mg daily for 90-day supply.  We will continue to monitor patient's BMI.  Once BMI below 30 we will reconsider medication regiment.  Discussed with patient risk versus benefit of taking this medications and signs and symptoms that would indicate needing to stop the medication.  - Controlled substance contract signed  - Encourage patient to maintain her healthy diet, journaling and increased physical activity and exercise.  - Patient is interested in attending lifestyle medicine group sessions.  Information forwarded to Dr. Valerio  - Follow-up in 3 months

## 2023-09-21 NOTE — PROGRESS NOTES
CC:The encounter diagnosis was BMI 33.0-33.9,adult.    HISTORY OF PRESENT ILLNESS: Patient is a 28 y.o. female patient who presents today to establish and discuss weight loss medication management.    Problem   Bmi 33.0-33.9,Adult    Patient is here to establish care due to a change in insurance she is no longer to see her previous provider.  Patient had gained a tremendous amount of weight having children back to back, last child born in 2022.  Patient had been prescribed phentermine for weight loss.  Patient reports she started the medication in March weighting 230 lb and in July she was down to 175 lb. patient reports success with this medication and says that it helps suppress her appetite and reduce her cravings.  Patient has not been taking the medication since August 1 and has gained 10 pounds.  Patient has also been eating a healthy diet and has increased her physical activity and exercise.  Patient journals what she eats and has learned a lot about herself in regards to her triggers that make her crave unhealthy foods.  Denies any cardiac history.  Denies any headache, lightheadedness, dizziness, shortness of breath or chest pain.        Patient Active Problem List    Diagnosis Date Noted    Missed menses 07/12/2023    Iron deficiency anemia secondary to inadequate dietary iron intake 04/19/2023    Anxiety and depression 02/27/2023    Family history of diabetes mellitus (DM) 06/02/2016    Vitamin D deficiency 05/17/2016    Midline low back pain without sciatica 05/05/2016    BMI 33.0-33.9,adult 05/05/2016      Allergies:Codeine    Current Outpatient Medications   Medication Sig Dispense Refill    phentermine 37.5 MG capsule Take 1 Capsule by mouth every morning for 90 days. 90 Capsule 0     No current facility-administered medications for this visit.     Social History     Tobacco Use    Smoking status: Never    Smokeless tobacco: Never   Vaping Use    Vaping Use: Some days    Last attempt to quit:  "5/20/2011    Substances: THC    Devices: Disposable   Substance Use Topics    Alcohol use: Not Currently    Drug use: Yes     Types: Inhaled, Marijuana     Comment: insomnia, stress, occasional     Social History     Social History Narrative    Not on file       Family History   Problem Relation Age of Onset    No Known Problems Mother     Diabetes Father     Diabetes Paternal Aunt     Heart Disease Maternal Grandfather     Alcohol/Drug Paternal Grandfather     Diabetes Paternal Grandfather     Stroke Neg Hx     Cancer Neg Hx     Lung Disease Neg Hx     Hypertension Neg Hx        Exam:    /78   Pulse 78   Temp 36.7 °C (98 °F)   Ht 1.6 m (5' 3\")   Wt 85.3 kg (188 lb)   SpO2 97%  Body mass index is 33.3 kg/m².    General:  Well nourished, well developed female in NAD  HENT: Atraumatic, normocephalic  EYES: Extraocular movements intact, pupils equal and reactive to light  NECK: Supple, FROM  CHEST: No deformities, Equal chest expansion.  Regular rate and rhythm no murmur appreciated  RESP: Unlabored, no stridor or audible wheeze.  Clear to auscultation bilaterally no wheezing  ABD: Non-Distended  Extremities: No Clubbing, Cyanosis, or Edema  Skin: Warm/dry, without rashes  Neuro: A/O x 4, CN 2-12 Grossly intact, Motor/sensory grossly intact  Psych: Normal behavior, normal affect    LABS: Results reviewed and discussed with the patient, questions answered.    BMI 33.0-33.9,adult  Afebrile, vital signs stable.  BMI 33.  Patient was prescribed phentermine 37.5 mg daily by previous provider.  Patient has had significant success with this medication and weight loss.  PE: Cardio regular rate and rhythm no murmur appreciated, pulm clear to auscultation bilaterally no wheezing appreciated.    Plan  - Prescribed phentermine 37.5 mg daily for 90-day supply.  We will continue to monitor patient's BMI.  Once BMI below 30 we will reconsider medication regiment.  Discussed with patient risk versus benefit of taking this " medications and signs and symptoms that would indicate needing to stop the medication.  - Controlled substance contract signed  - Encourage patient to maintain her healthy diet, journaling and increased physical activity and exercise.  - Patient is interested in attending lifestyle medicine group sessions.  Information forwarded to Dr. Valerio  - Follow-up in 3 months    No follow-ups on file.    Nancy Schneider MD PGY2

## 2024-06-03 ENCOUNTER — APPOINTMENT (OUTPATIENT)
Dept: MEDICAL GROUP | Facility: CLINIC | Age: 29
End: 2024-06-03
Payer: MEDICAID

## 2024-06-03 VITALS
OXYGEN SATURATION: 96 % | BODY MASS INDEX: 32.44 KG/M2 | SYSTOLIC BLOOD PRESSURE: 117 MMHG | HEIGHT: 64 IN | WEIGHT: 190 LBS | HEART RATE: 65 BPM | RESPIRATION RATE: 16 BRPM | TEMPERATURE: 96.9 F | DIASTOLIC BLOOD PRESSURE: 80 MMHG

## 2024-06-03 DIAGNOSIS — F41.8 DEPRESSION WITH ANXIETY: ICD-10-CM

## 2024-06-03 DIAGNOSIS — F32.2 CURRENT SEVERE EPISODE OF MAJOR DEPRESSIVE DISORDER WITHOUT PSYCHOTIC FEATURES WITHOUT PRIOR EPISODE (HCC): ICD-10-CM

## 2024-06-03 PROBLEM — F32.9 MAJOR DEPRESSIVE DISORDER: Status: ACTIVE | Noted: 2024-06-03

## 2024-06-03 PROCEDURE — 99214 OFFICE O/P EST MOD 30 MIN: CPT | Mod: GC

## 2024-06-03 RX ORDER — HYDROXYZINE HYDROCHLORIDE 25 MG/1
25 TABLET, FILM COATED ORAL 3 TIMES DAILY PRN
Qty: 30 TABLET | Refills: 0 | Status: SHIPPED | OUTPATIENT
Start: 2024-06-03

## 2024-06-03 RX ORDER — BUPROPION HYDROCHLORIDE 150 MG/1
150 TABLET ORAL EVERY MORNING
Qty: 60 TABLET | Refills: 1 | Status: SHIPPED | OUTPATIENT
Start: 2024-06-03 | End: 2024-06-24 | Stop reason: SDUPTHER

## 2024-06-03 SDOH — SOCIAL STABILITY: SOCIAL INSECURITY: WITHIN THE LAST YEAR, HAVE YOU BEEN HUMILIATED OR EMOTIONALLY ABUSED IN OTHER WAYS BY YOUR PARTNER OR EX-PARTNER?: YES

## 2024-06-03 SDOH — SOCIAL STABILITY: SOCIAL INSECURITY
WITHIN THE LAST YEAR, HAVE TO BEEN RAPED OR FORCED TO HAVE ANY KIND OF SEXUAL ACTIVITY BY YOUR PARTNER OR EX-PARTNER?: NO

## 2024-06-03 SDOH — SOCIAL STABILITY: SOCIAL INSECURITY: WITHIN THE LAST YEAR, HAVE YOU BEEN AFRAID OF YOUR PARTNER OR EX-PARTNER?: NO

## 2024-06-03 SDOH — SOCIAL STABILITY: SOCIAL INSECURITY
WITHIN THE LAST YEAR, HAVE YOU BEEN KICKED, HIT, SLAPPED, OR OTHERWISE PHYSICALLY HURT BY YOUR PARTNER OR EX-PARTNER?: NO

## 2024-06-03 ASSESSMENT — FIBROSIS 4 INDEX: FIB4 SCORE: 0.45

## 2024-06-03 ASSESSMENT — PATIENT HEALTH QUESTIONNAIRE - PHQ9
CLINICAL INTERPRETATION OF PHQ2 SCORE: 4
5. POOR APPETITE OR OVEREATING: 1 - SEVERAL DAYS
SUM OF ALL RESPONSES TO PHQ QUESTIONS 1-9: 16

## 2024-06-03 NOTE — PROGRESS NOTES
CC:Diagnoses of Depression with anxiety and BMI 33.0-33.9,adult were pertinent to this visit.    HISTORY OF PRESENT ILLNESS: Patient is a 29 y.o. female established patient who presents today for the following:    Problem   Depression With Anxiety    Patient is a been suffering from depression and anxiety for the past 2 months.  She is going through a separation from her partner of 10 years and has to take care of her 2 children.  Patient has been having difficulty with work due to panic attacks.  She has reached out through her work to start therapy but has not found a therapist that she feels comfortable with.  Patient is also used her works hotline multiple times when she felt overwhelmed.  Patient reports that she is just trying to get her life back.  Patient occasionally feels like the world would better without her but then remember she has her 2 kids and would never hurt herself.     Bmi 33.0-33.9,Adult    Last Visit  9/21/2023  Patient is here to establish care due to a change in insurance she is no longer to see her previous provider.  Patient had gained a tremendous amount of weight having children back to back, last child born in 2022.  Patient had been prescribed phentermine for weight loss.  Patient reports she started the medication in March weighting 230 lb and in July she was down to 175 lb. patient reports success with this medication and says that it helps suppress her appetite and reduce her cravings.  Patient has not been taking the medication since August 1 and has gained 10 pounds.  Patient has also been eating a healthy diet and has increased her physical activity and exercise.  Patient journals what she eats and has learned a lot about herself in regards to her triggers that make her crave unhealthy foods.  Denies any cardiac history.  Denies any headache, lightheadedness, dizziness, shortness of breath or chest pain.    Today's Visit:  6/3/2024  Patient presents to clinic to discuss  weight loss medication management.  Patient reports losing 20 pounds when she started taking the phentermine last September.  However patient has not taken the medication for over 2 months.  Patient is currently going through a separation with her partner of 10 years and has 2 children.  Patient is currently suffering from depression and anxiety.        Patient Active Problem List    Diagnosis Date Noted    Missed menses 07/12/2023    Iron deficiency anemia secondary to inadequate dietary iron intake 04/19/2023    Depression with anxiety 02/27/2023    Family history of diabetes mellitus (DM) 06/02/2016    Vitamin D deficiency 05/17/2016    Midline low back pain without sciatica 05/05/2016    BMI 33.0-33.9,adult 05/05/2016      Allergies:Codeine    Current Outpatient Medications   Medication Sig Dispense Refill    buPROPion (WELLBUTRIN XL) 150 MG XL tablet Take 1 Tablet by mouth every morning. START with 150 mg daily for 4 days, THEN on Day 4 increase to 300 mg daily for 3 weeks 60 Tablet 1    hydrOXYzine HCl (ATARAX) 25 MG Tab Take 1 Tablet by mouth 3 times a day as needed for Anxiety. 30 Tablet 0     No current facility-administered medications for this visit.     Social History     Tobacco Use    Smoking status: Never    Smokeless tobacco: Never   Vaping Use    Vaping status: Some Days    Last attempt to quit: 5/20/2011    Substances: THC    Devices: Disposable   Substance Use Topics    Alcohol use: Not Currently    Drug use: Yes     Types: Inhaled, Marijuana     Comment: insomnia, stress, occasional     Social History     Social History Narrative    Not on file     Family History   Problem Relation Age of Onset    No Known Problems Mother     Diabetes Father     Diabetes Paternal Aunt     Heart Disease Maternal Grandfather     Alcohol/Drug Paternal Grandfather     Diabetes Paternal Grandfather     Stroke Neg Hx     Cancer Neg Hx     Lung Disease Neg Hx     Hypertension Neg Hx      Exam:    /80 (BP Location:  "Left arm, Patient Position: Sitting, BP Cuff Size: Adult)   Pulse 65   Temp 36.1 °C (96.9 °F) (Temporal)   Resp 16   Ht 1.613 m (5' 3.5\")   Wt 86.2 kg (190 lb)   SpO2 96%  Body mass index is 33.13 kg/m².    General:  Well nourished, well developed female in NAD  HENT: Atraumatic, normocephalic  EYES: Extraocular movements intact, pupils equal and reactive to light  NECK: Supple, FROM  CHEST: No deformities, Equal chest expansion  RESP: Unlabored, no stridor or audible wheeze  ABD: Non-Distended  Extremities: No Clubbing, Cyanosis, or Edema  Skin: Warm/dry, without rashes  Neuro: A/O x 4, CN 2-12 Grossly intact, Motor/sensory grossly intact  Psych: Normal behavior, normal affect    Depression Screening    Little interest or pleasure in doing things?  2 - more than half the days   Feeling down, depressed , or hopeless? 2 - more than half the days   Trouble falling or staying asleep, or sleeping too much?  2 - more than half the days   Feeling tired or having little energy?  2 - more than half the days   Poor appetite or overeating?  1 - several days   Feeling bad about yourself - or that you are a failure or have let yourself or your family down? 2 - more than half the days   Trouble concentrating on things, such as reading the newspaper or watching television? 2 - more than half the days   Moving or speaking so slowly that other people could have noticed.  Or the opposite - being so fidgety or restless that you have been moving around a lot more than usual?  2 - more than half the days   Thoughts that you would be better off dead, or of hurting yourself?  1 - several days   Patient Health Questionnaire Score: 16     If depressive symptoms identified deferred to follow up visit unless specifically addressed in assesment and plan.    Interpretation of PHQ-9 Total Score   Score Severity   1-4 No Depression   5-9 Mild Depression   10-14 Moderate Depression   15-19 Moderately Severe Depression   20-27 Severe " Depression     LABS: Results reviewed and discussed with the patient, questions answered.    ASSESSMENT/PLAN:    Depression with anxiety  29-year-old female suffering from depression anxiety for the past 2 months due to recent separation from her partner of 10 years in which they share 2 children.  Patient reports multiple panic attacks which is affecting her work.  Her work is put her on intermediate work in the event she has a panic attack she will be getting paid.  Patient has reached out several times to her works hotline when she feels overwhelmed with her depression and anxiety.  Patient's PHQ-9 score 16 indicating moderately severe depression.  Discussed patient's desires to lose weight and starting Wellbutrin to help with her depression.  Patient was agreeable to this.  Discussed with patient that Wellbutrin is an activating medication and might additionally make her anxiety worse but would eventually level out.  Also discussed giving patient as needed Atarax to help with her panic attacks and anxiety.  Patient will follow-up in 3 weeks to see how she is tolerating medication with possible titration.  She will make an appointment to see  to initiate therapy    BMI 33.0-33.9,adult  29-year-old female with a BMI of 33.  Patient has not taken her phentermine for over 2 months.  Patient is currently having an episode of depression and anxiety.  Patient will be started on Wellbutrin for depression with Atarax for panic attacks and anxiety.  Will hold off on restarting phentermine at this time.  Will reevaluate patient in 3 weeks to determine if depression and anxiety is improving.  Once depression and anxiety are managed will consider restarting patient's weight loss management medications.  Courage patient to continue eating a diet rich in fruits and vegetables and getting 150 minutes of moderate exercise weekly.    Return in about 3 weeks (around 6/24/2024).    Nancy Schneider MD PGY2

## 2024-06-03 NOTE — ASSESSMENT & PLAN NOTE
29-year-old female suffering from depression anxiety for the past 2 months due to recent separation from her partner of 10 years in which they share 2 children.  Patient reports multiple panic attacks which is affecting her work.  Her work is put her on intermediate work in the event she has a panic attack she will be getting paid.  Patient has reached out several times to her works hotline when she feels overwhelmed with her depression and anxiety.  Patient's PHQ-9 score 16 indicating moderately severe depression.  Discussed patient's desires to lose weight and starting Wellbutrin to help with her depression.  Patient was agreeable to this.  Discussed with patient that Wellbutrin is an activating medication and might additionally make her anxiety worse but would eventually level out.  Also discussed giving patient as needed Atarax to help with her panic attacks and anxiety.  Patient will follow-up in 3 weeks to see how she is tolerating medication with possible titration.  She will make an appointment to see  to initiate therapy.

## 2024-06-03 NOTE — ASSESSMENT & PLAN NOTE
29-year-old female with a BMI of 33.  Patient has not taken her phentermine for over 2 months.  Patient is currently having an episode of depression and anxiety.  Patient will be started on Wellbutrin for depression with Atarax for panic attacks and anxiety.  Will hold off on restarting phentermine at this time.  Will reevaluate patient in 3 weeks to determine if depression and anxiety is improving.  Once depression and anxiety are managed will consider restarting patient's weight loss management medications.  Courage patient to continue eating a diet rich in fruits and vegetables and getting 150 minutes of moderate exercise weekly.

## 2024-06-03 NOTE — ASSESSMENT & PLAN NOTE
Patient is a been suffering from depression and anxiety for the past 2 months.  She is going through a separation from her partner of 10 years and has to take care of her 2 children.  Patient has been having difficulty with work due to panic attacks.  She has reached out through her work to start therapy but has not found a therapist that she feels comfortable with.  Patient is also used her works hotline multiple times when she felt overwhelmed.  Patient reports that she is just trying to get her life back.  Patient occasionally feels like the world would better without her but then remember she has her 2 kids and would never hurt herself.

## 2024-06-11 ENCOUNTER — OFFICE VISIT (OUTPATIENT)
Dept: URGENT CARE | Facility: PHYSICIAN GROUP | Age: 29
End: 2024-06-11
Payer: MEDICAID

## 2024-06-11 ENCOUNTER — HOSPITAL ENCOUNTER (OUTPATIENT)
Facility: MEDICAL CENTER | Age: 29
End: 2024-06-11
Attending: STUDENT IN AN ORGANIZED HEALTH CARE EDUCATION/TRAINING PROGRAM
Payer: MEDICAID

## 2024-06-11 VITALS
OXYGEN SATURATION: 98 % | WEIGHT: 180.78 LBS | HEIGHT: 64 IN | TEMPERATURE: 97 F | DIASTOLIC BLOOD PRESSURE: 62 MMHG | SYSTOLIC BLOOD PRESSURE: 104 MMHG | HEART RATE: 70 BPM | RESPIRATION RATE: 16 BRPM | BODY MASS INDEX: 30.86 KG/M2

## 2024-06-11 DIAGNOSIS — N76.0 ACUTE VAGINITIS: ICD-10-CM

## 2024-06-11 LAB
APPEARANCE UR: CLEAR
BILIRUB UR STRIP-MCNC: NEGATIVE MG/DL
COLOR UR AUTO: YELLOW
GLUCOSE UR STRIP.AUTO-MCNC: NEGATIVE MG/DL
KETONES UR STRIP.AUTO-MCNC: NORMAL MG/DL
LEUKOCYTE ESTERASE UR QL STRIP.AUTO: NORMAL
NITRITE UR QL STRIP.AUTO: NEGATIVE
PH UR STRIP.AUTO: 7 [PH] (ref 5–8)
POCT INT CON NEG: NEGATIVE
POCT INT CON POS: POSITIVE
POCT URINE PREGNANCY TEST: NEGATIVE
PROT UR QL STRIP: NEGATIVE MG/DL
RBC UR QL AUTO: NORMAL
SP GR UR STRIP.AUTO: 1.02
UROBILINOGEN UR STRIP-MCNC: 1 MG/DL

## 2024-06-11 PROCEDURE — 99213 OFFICE O/P EST LOW 20 MIN: CPT | Performed by: STUDENT IN AN ORGANIZED HEALTH CARE EDUCATION/TRAINING PROGRAM

## 2024-06-11 PROCEDURE — 81025 URINE PREGNANCY TEST: CPT | Performed by: STUDENT IN AN ORGANIZED HEALTH CARE EDUCATION/TRAINING PROGRAM

## 2024-06-11 PROCEDURE — 3074F SYST BP LT 130 MM HG: CPT | Performed by: STUDENT IN AN ORGANIZED HEALTH CARE EDUCATION/TRAINING PROGRAM

## 2024-06-11 PROCEDURE — 87660 TRICHOMONAS VAGIN DIR PROBE: CPT

## 2024-06-11 PROCEDURE — 87480 CANDIDA DNA DIR PROBE: CPT

## 2024-06-11 PROCEDURE — 3078F DIAST BP <80 MM HG: CPT | Performed by: STUDENT IN AN ORGANIZED HEALTH CARE EDUCATION/TRAINING PROGRAM

## 2024-06-11 PROCEDURE — 87077 CULTURE AEROBIC IDENTIFY: CPT

## 2024-06-11 PROCEDURE — 81002 URINALYSIS NONAUTO W/O SCOPE: CPT | Performed by: STUDENT IN AN ORGANIZED HEALTH CARE EDUCATION/TRAINING PROGRAM

## 2024-06-11 PROCEDURE — 87510 GARDNER VAG DNA DIR PROBE: CPT

## 2024-06-11 PROCEDURE — 87086 URINE CULTURE/COLONY COUNT: CPT

## 2024-06-11 RX ORDER — METRONIDAZOLE 500 MG/1
500 TABLET ORAL 2 TIMES DAILY
Qty: 14 TABLET | Refills: 0 | Status: SHIPPED | OUTPATIENT
Start: 2024-06-11 | End: 2024-06-24

## 2024-06-11 ASSESSMENT — FIBROSIS 4 INDEX: FIB4 SCORE: 0.45

## 2024-06-11 NOTE — PROGRESS NOTES
Subjective:   CHIEF COMPLAINT  Chief Complaint   Patient presents with    UTI     X1 week. Tried taking azo w/ no relief, last taken 2 days ago. C/o dysuria, irritation, and pain/uncomfortable feeling in her bladder.        [facility]  Barbara David is a 29 y.o. female who presents for evaluation of 1 week of urinary symptoms.  Reports she has been experiencing dysuria, difficulty with voiding and sensitivity/irritation of the vaginal tissue.  She took AZO which helped for couple days however says symptoms never fully resolved.  Positive ROS for abnormal vaginal discharge with odor.  No significant pruritus.  No recent antibiotics.  No concerns for STDs.  Does not have a history of recurrent UTIs.    REVIEW OF SYSTEMS  General: no fever or chills  GI: no nausea or vomiting  See HPI for further details.    PAST MEDICAL HISTORY  Patient Active Problem List    Diagnosis Date Noted    Major depressive disorder 06/03/2024    Missed menses 07/12/2023    Iron deficiency anemia secondary to inadequate dietary iron intake 04/19/2023    Depression with anxiety 02/27/2023    Family history of diabetes mellitus (DM) 06/02/2016    Vitamin D deficiency 05/17/2016    Midline low back pain without sciatica 05/05/2016    BMI 33.0-33.9,adult 05/05/2016       SURGICAL HISTORY   has a past surgical history that includes ovarian cystectomy (Right, 08/10/2020) and lap,diagnostic abdomen (N/A, 12/29/2022).    ALLERGIES  Allergies   Allergen Reactions    Codeine Swelling       CURRENT MEDICATIONS  buPROPion  hydrOXYzine HCl Tabs  metroNIDAZOLE Tabs    SOCIAL HISTORY  Social History     Tobacco Use    Smoking status: Never    Smokeless tobacco: Never   Vaping Use    Vaping status: Some Days    Last attempt to quit: 5/20/2011    Substances: THC    Devices: Disposable   Substance and Sexual Activity    Alcohol use: Not Currently    Drug use: Yes     Types: Inhaled, Marijuana     Comment: insomnia, stress, occasional    Sexual  "activity: Yes     Partners: Male     Birth control/protection: Surgical     Comment: none       FAMILY HISTORY  Family History   Problem Relation Age of Onset    No Known Problems Mother     Diabetes Father     Diabetes Paternal Aunt     Heart Disease Maternal Grandfather     Alcohol/Drug Paternal Grandfather     Diabetes Paternal Grandfather     Stroke Neg Hx     Cancer Neg Hx     Lung Disease Neg Hx     Hypertension Neg Hx           Objective:   PHYSICAL EXAM  VITAL SIGNS: /62 (BP Location: Right arm, Patient Position: Sitting, BP Cuff Size: Adult)   Pulse 70   Temp 36.1 °C (97 °F) (Temporal)   Resp 16   Ht 1.613 m (5' 3.5\") Comment: Pt reported  Wt 82 kg (180 lb 12.4 oz)   SpO2 98%   BMI 31.52 kg/m²     Gen: no acute distress, normal voice  Skin: dry, intact, moist mucosal membranes  Eyes: No conjunctival injection bilaterally.  Neck: Normal range of motion. No meningeal signs.   Lungs: No increased work of breathing.  CTAB w/ symmetric expansion  CV: RRR w/o murmurs or clicks  Psych: normal affect, normal judgement, alert, awake    Assessment/Plan:     1. Acute vaginitis  POCT Urinalysis    POCT PREGNANCY    VAGINAL PATHOGENS DNA PANEL    URINE CULTURE(NEW)    metroNIDAZOLE (FLAGYL) 500 MG Tab      Malodorous discharge with dysuria suggestive of underlying BV infection.  -Ordered metronidazole  -Ordered urine culture  -Ordered vaginal pathogens  -Return to urgent care any new/worsening symptoms or further questions or concerns.  Patient understood everything discussed.  All questions were answered.      Differential diagnosis and supportive care discussed. Follow-up as needed if symptoms worsen or fail to improve to PCP, Urgent care or Emergency Room.    Please note that this dictation was created using voice recognition software. I have made a reasonable attempt to correct obvious errors, but I expect that there are errors of grammar and possibly content that I did not discover before finalizing " the note.

## 2024-06-12 LAB
CANDIDA DNA VAG QL PROBE+SIG AMP: NEGATIVE
G VAGINALIS DNA VAG QL PROBE+SIG AMP: POSITIVE
T VAGINALIS DNA VAG QL PROBE+SIG AMP: NEGATIVE

## 2024-06-14 LAB
BACTERIA UR CULT: NORMAL
SIGNIFICANT IND 70042: NORMAL
SITE SITE: NORMAL
SOURCE SOURCE: NORMAL

## 2024-06-24 ENCOUNTER — OFFICE VISIT (OUTPATIENT)
Dept: MEDICAL GROUP | Facility: CLINIC | Age: 29
End: 2024-06-24
Payer: MEDICAID

## 2024-06-24 VITALS
BODY MASS INDEX: 31.67 KG/M2 | OXYGEN SATURATION: 97 % | RESPIRATION RATE: 16 BRPM | HEART RATE: 83 BPM | SYSTOLIC BLOOD PRESSURE: 109 MMHG | WEIGHT: 185.5 LBS | TEMPERATURE: 98 F | DIASTOLIC BLOOD PRESSURE: 72 MMHG | HEIGHT: 64 IN

## 2024-06-24 DIAGNOSIS — F41.8 DEPRESSION WITH ANXIETY: ICD-10-CM

## 2024-06-24 PROCEDURE — 99213 OFFICE O/P EST LOW 20 MIN: CPT | Mod: GE

## 2024-06-24 RX ORDER — BUPROPION HYDROCHLORIDE 150 MG/1
300 TABLET ORAL EVERY MORNING
Qty: 60 TABLET | Refills: 2 | Status: SHIPPED | OUTPATIENT
Start: 2024-06-24

## 2024-06-24 SDOH — SOCIAL STABILITY: SOCIAL INSECURITY: WITHIN THE LAST YEAR, HAVE YOU BEEN HUMILIATED OR EMOTIONALLY ABUSED IN OTHER WAYS BY YOUR PARTNER OR EX-PARTNER?: NO

## 2024-06-24 SDOH — SOCIAL STABILITY: SOCIAL INSECURITY: WITHIN THE LAST YEAR, HAVE YOU BEEN AFRAID OF YOUR PARTNER OR EX-PARTNER?: NO

## 2024-06-24 ASSESSMENT — PATIENT HEALTH QUESTIONNAIRE - PHQ9
5. POOR APPETITE OR OVEREATING: 1 - SEVERAL DAYS
SUM OF ALL RESPONSES TO PHQ QUESTIONS 1-9: 8
CLINICAL INTERPRETATION OF PHQ2 SCORE: 2

## 2024-06-24 ASSESSMENT — FIBROSIS 4 INDEX: FIB4 SCORE: 0.45

## 2024-06-24 NOTE — ASSESSMENT & PLAN NOTE
29-year-old female started on Wellbutrin 150 mg daily.  Presents to clinic for 3-week follow-up.  Patient reports she is doing better and has noticed significant change after taking the medication.  Patient is more aware of her mood and is having less days with depression symptoms.  Patient also reports her anxiety is less and the hydroxyzine does help with her nighttime depression.  Patient denies any weight loss during the last 3 weeks.  Patient continues to have adjusted schedule at work for when she has anxiety attacks.  Denies any SI/HI.  PHQ-9 score of 8 indicating mild depression.  Refill Wellbutrin 300 mg daily    Patient would like to start taking phentermine for weight loss.  Discussed the weight loss benefits of Wellbutrin and will follow-up in 3 weeks to see if patient has noticed any weight loss.  If at that time patient wants to restart phentermine will need to evaluate her level of anxiety and switch Wellbutrin to an SSRI to decrease stimulant side effects.

## 2024-06-24 NOTE — PROGRESS NOTES
CC:The encounter diagnosis was Depression with anxiety.    HISTORY OF PRESENT ILLNESS: Patient is a 29 y.o. female established patient who presents today for the following:    Problem   Depression With Anxiety    Last Visit:  06/03/2024  Patient is a been suffering from depression and anxiety for the past 2 months.  She is going through a separation from her partner of 10 years and has to take care of her 2 children.  Patient has been having difficulty with work due to panic attacks.  She has reached out through her work to start therapy but has not found a therapist that she feels comfortable with.  Patient is also used her works hotline multiple times when she felt overwhelmed.  Patient reports that she is just trying to get her life back.  Patient occasionally feels like the world would better without her but then remember she has her 2 kids and would never hurt herself.    Today's Visit:  06/24/2024  Presents to clinic for 3-week follow-up.  Patient reports that the Wellbutrin is making her more aware of how she is feeling and she is overall feeling better.  Patient reports that she is not as hopeless that she was 3 weeks ago.  Patient has not noticed any significant weight loss.  Patient reports that the hydroxyzine makes her very tired so she only takes it at night when she is anxious.  Overall patient is doing well.  Denies any SI/HI.        Patient Active Problem List    Diagnosis Date Noted    Major depressive disorder 06/03/2024    Missed menses 07/12/2023    Iron deficiency anemia secondary to inadequate dietary iron intake 04/19/2023    Depression with anxiety 02/27/2023    Family history of diabetes mellitus (DM) 06/02/2016    Vitamin D deficiency 05/17/2016    Midline low back pain without sciatica 05/05/2016    BMI 33.0-33.9,adult 05/05/2016      Allergies:Codeine    Current Outpatient Medications   Medication Sig Dispense Refill    buPROPion (WELLBUTRIN XL) 150 MG XL tablet Take 2 Tablets by  "mouth every morning. 60 Tablet 2    hydrOXYzine HCl (ATARAX) 25 MG Tab Take 1 Tablet by mouth 3 times a day as needed for Anxiety. (Patient not taking: Reported on 6/11/2024) 30 Tablet 0     No current facility-administered medications for this visit.     Social History     Tobacco Use    Smoking status: Never    Smokeless tobacco: Never   Vaping Use    Vaping status: Some Days    Last attempt to quit: 5/20/2011    Substances: THC    Devices: Disposable   Substance Use Topics    Alcohol use: Not Currently    Drug use: Yes     Types: Inhaled, Marijuana     Comment: insomnia, stress, occasional     Social History     Social History Narrative    Not on file       Family History   Problem Relation Age of Onset    No Known Problems Mother     Diabetes Father     Diabetes Paternal Aunt     Heart Disease Maternal Grandfather     Alcohol/Drug Paternal Grandfather     Diabetes Paternal Grandfather     Stroke Neg Hx     Cancer Neg Hx     Lung Disease Neg Hx     Hypertension Neg Hx        Exam:    /72 (BP Location: Right arm, Patient Position: Sitting, BP Cuff Size: Adult)   Pulse 83   Temp 36.7 °C (98 °F) (Temporal)   Resp 16   Ht 1.613 m (5' 3.5\")   Wt 84.1 kg (185 lb 8 oz)   SpO2 97%  Body mass index is 32.34 kg/m².    General:  Well nourished, well developed female in NAD  HENT: Atraumatic, normocephalic  EYES: Extraocular movements intact, pupils equal and reactive to light  NECK: Supple, FROM  CHEST: No deformities, Equal chest expansion  RESP: Unlabored, no stridor or audible wheeze  ABD: Non-Distended  Extremities: No Clubbing, Cyanosis, or Edema  Skin: Warm/dry, without rashes  Neuro: A/O x 4, CN 2-12 Grossly intact, Motor/sensory grossly intact  Psych: Normal behavior, normal affect    Depression Screening    Little interest or pleasure in doing things?  1 - several days   Feeling down, depressed , or hopeless? 1 - several days   Trouble falling or staying asleep, or sleeping too much?  1 - several days "   Feeling tired or having little energy?  1 - several days   Poor appetite or overeating?  1 - several days   Feeling bad about yourself - or that you are a failure or have let yourself or your family down? 1 - several days   Trouble concentrating on things, such as reading the newspaper or watching television? 1 - several days   Moving or speaking so slowly that other people could have noticed.  Or the opposite - being so fidgety or restless that you have been moving around a lot more than usual?  1 - several days   Thoughts that you would be better off dead, or of hurting yourself?  0 - not at all   Patient Health Questionnaire Score: 8     If depressive symptoms identified deferred to follow up visit unless specifically addressed in assesment and plan.    Interpretation of PHQ-9 Total Score   Score Severity   1-4 No Depression   5-9 Mild Depression   10-14 Moderate Depression   15-19 Moderately Severe Depression   20-27 Severe Depression     LABS: Results reviewed and discussed with the patient, questions answered.    ASSESSMENT/PLAN:    Depression with anxiety  29-year-old female started on Wellbutrin 150 mg daily.  Presents to clinic for 3-week follow-up.  Patient reports she is doing better and has noticed significant change after taking the medication.  Patient is more aware of her mood and is having less days with depression symptoms.  Patient also reports her anxiety is less and the hydroxyzine does help with her nighttime depression.  Patient denies any weight loss during the last 3 weeks.  Patient continues to have adjusted schedule at work for when she has anxiety attacks.  Denies any SI/HI.  PHQ-9 score of 8 indicating mild depression.  Refill Wellbutrin 300 mg daily    Patient would like to start taking phentermine for weight loss.  Discussed the weight loss benefits of Wellbutrin and will follow-up in 3 weeks to see if patient has noticed any weight loss.  If at that time patient wants to restart  phentermine will need to evaluate her level of anxiety and switch Wellbutrin to an SSRI to decrease stimulant side effects.    Return in about 3 weeks (around 7/15/2024).    Nancy Schneider MD PGY2

## 2024-07-16 ENCOUNTER — OFFICE VISIT (OUTPATIENT)
Dept: MEDICAL GROUP | Facility: CLINIC | Age: 29
End: 2024-07-16
Payer: MEDICAID

## 2024-07-16 VITALS
OXYGEN SATURATION: 97 % | RESPIRATION RATE: 16 BRPM | WEIGHT: 192 LBS | DIASTOLIC BLOOD PRESSURE: 72 MMHG | BODY MASS INDEX: 32.78 KG/M2 | HEIGHT: 64 IN | TEMPERATURE: 97.5 F | HEART RATE: 86 BPM | SYSTOLIC BLOOD PRESSURE: 122 MMHG

## 2024-07-16 DIAGNOSIS — F41.8 DEPRESSION WITH ANXIETY: ICD-10-CM

## 2024-07-16 PROCEDURE — 99213 OFFICE O/P EST LOW 20 MIN: CPT | Mod: GE

## 2024-07-16 ASSESSMENT — PATIENT HEALTH QUESTIONNAIRE - PHQ9
5. POOR APPETITE OR OVEREATING: 1 - SEVERAL DAYS
SUM OF ALL RESPONSES TO PHQ QUESTIONS 1-9: 7
CLINICAL INTERPRETATION OF PHQ2 SCORE: 2

## 2024-07-16 ASSESSMENT — FIBROSIS 4 INDEX: FIB4 SCORE: 0.45

## 2024-10-18 ENCOUNTER — OFFICE VISIT (OUTPATIENT)
Dept: MEDICAL GROUP | Facility: CLINIC | Age: 29
End: 2024-10-18
Payer: MEDICAID

## 2024-10-18 VITALS
SYSTOLIC BLOOD PRESSURE: 118 MMHG | BODY MASS INDEX: 35.17 KG/M2 | WEIGHT: 206 LBS | HEART RATE: 88 BPM | RESPIRATION RATE: 16 BRPM | OXYGEN SATURATION: 97 % | HEIGHT: 64 IN | DIASTOLIC BLOOD PRESSURE: 79 MMHG

## 2024-10-18 DIAGNOSIS — Z71.3 ENCOUNTER FOR WEIGHT LOSS COUNSELING: ICD-10-CM

## 2024-10-18 DIAGNOSIS — F41.8 DEPRESSION WITH ANXIETY: ICD-10-CM

## 2024-10-18 PROCEDURE — 99213 OFFICE O/P EST LOW 20 MIN: CPT | Mod: GE

## 2024-10-18 RX ORDER — TOPIRAMATE 25 MG/1
25 TABLET, FILM COATED ORAL DAILY
Qty: 30 TABLET | Refills: 0 | Status: SHIPPED | OUTPATIENT
Start: 2024-10-18 | End: 2024-10-22 | Stop reason: SDUPTHER

## 2024-10-18 RX ORDER — HYDROXYZINE HYDROCHLORIDE 25 MG/1
25 TABLET, FILM COATED ORAL 3 TIMES DAILY PRN
Qty: 30 TABLET | Refills: 0 | Status: SHIPPED | OUTPATIENT
Start: 2024-10-18

## 2024-10-18 RX ORDER — BUPROPION HYDROCHLORIDE 150 MG/1
300 TABLET ORAL EVERY MORNING
Qty: 60 TABLET | Refills: 2 | Status: SHIPPED | OUTPATIENT
Start: 2024-10-18

## 2024-10-18 RX ORDER — PHENTERMINE HYDROCHLORIDE 15 MG/1
15 CAPSULE ORAL EVERY MORNING
Qty: 30 CAPSULE | Refills: 0 | Status: SHIPPED | OUTPATIENT
Start: 2024-10-18 | End: 2024-10-22 | Stop reason: SDUPTHER

## 2024-10-18 ASSESSMENT — FIBROSIS 4 INDEX: FIB4 SCORE: 0.45

## 2024-10-22 DIAGNOSIS — Z71.3 ENCOUNTER FOR WEIGHT LOSS COUNSELING: ICD-10-CM

## 2024-10-22 RX ORDER — TOPIRAMATE 25 MG/1
25 TABLET, FILM COATED ORAL DAILY
Qty: 30 TABLET | Refills: 0 | Status: SHIPPED | OUTPATIENT
Start: 2024-10-22

## 2024-10-22 RX ORDER — PHENTERMINE HYDROCHLORIDE 15 MG/1
15 CAPSULE ORAL EVERY MORNING
Qty: 30 CAPSULE | Refills: 0 | Status: SHIPPED | OUTPATIENT
Start: 2024-10-22 | End: 2024-11-21

## 2024-11-22 ENCOUNTER — OFFICE VISIT (OUTPATIENT)
Dept: MEDICAL GROUP | Facility: CLINIC | Age: 29
End: 2024-11-22
Payer: MEDICAID

## 2024-11-22 VITALS
OXYGEN SATURATION: 95 % | BODY MASS INDEX: 34.15 KG/M2 | SYSTOLIC BLOOD PRESSURE: 112 MMHG | TEMPERATURE: 97.1 F | DIASTOLIC BLOOD PRESSURE: 66 MMHG | HEART RATE: 85 BPM | HEIGHT: 64 IN | WEIGHT: 200 LBS | RESPIRATION RATE: 18 BRPM

## 2024-11-22 DIAGNOSIS — Z71.3 ENCOUNTER FOR WEIGHT LOSS COUNSELING: ICD-10-CM

## 2024-11-22 RX ORDER — TOPIRAMATE 25 MG/1
25 TABLET, FILM COATED ORAL DAILY
Qty: 90 TABLET | Refills: 0 | Status: SHIPPED | OUTPATIENT
Start: 2024-11-22

## 2024-11-22 RX ORDER — PHENTERMINE HYDROCHLORIDE 15 MG/1
15 CAPSULE ORAL EVERY MORNING
Qty: 90 CAPSULE | Refills: 0 | Status: SHIPPED | OUTPATIENT
Start: 2024-11-22 | End: 2025-02-20

## 2024-11-22 ASSESSMENT — FIBROSIS 4 INDEX: FIB4 SCORE: 0.45

## 2024-11-22 NOTE — PROGRESS NOTES
[facility]    Subjective:     CC: Follow-up on weight loss medications    HPI:   Barbara is a 29 y.o. female with:    Problem   Encounter for Weight Loss Counseling    Last Visit:  Patient presents to clinic to discuss starting phentermine.  Patient is no longer having episodes of anxiety or panic attacks.  Patient stopped taking her Wellbutrin and hydroxyzine due to moving and losing the medications.  Patient denies any side effects.  Patient reports that she has gained 14 pounds since the last time she was in clinic.  Patient has recently started incorporating food prep for meal planning.  Patient has also started a workout program.  Patient reports she had good success with phentermine in the past and would like to restart it.  Denies any headache, change in vision, shortness of breath, palpitations or chest pain.    Today's Visit:  11/22/2024  Patient presents to clinic for follow-up on loss medications.  Patient has been taking phentermine 15 mg and topiramate 25 mg daily.  She reports she has been experiencing some brain fog with the topiramate.  Patient reports that she is feeling better overall and has experienced some weight loss.  Patient continues to eat a healthy diet.  Patient has not worked out for a week due to a back injury but is hoping to get back into it before the holidays.  Patient denies any exacerbations of her depression and anxiety.  Patient has a very positive attitude and is excited about seeing her family for the holidays.         Current Outpatient Medications Ordered in Epic   Medication Sig Dispense Refill    phentermine 15 MG capsule Take 1 Capsule by mouth every morning for 90 days. 90 Capsule 0    topiramate (TOPAMAX) 25 MG Tab Take 1 Tablet by mouth every day. 90 Tablet 0    hydrOXYzine HCl (ATARAX) 25 MG Tab Take 1 Tablet by mouth 3 times a day as needed for Anxiety. 30 Tablet 0    buPROPion (WELLBUTRIN XL) 150 MG XL tablet Take 2 Tablets by mouth every morning. 60  "Tablet 2     No current [facility]-ordered facility-administered medications on file.         ROS:  Negative except as noted in HPI        Objective:     Exam:  /66 (BP Location: Left arm, Patient Position: Sitting, BP Cuff Size: Adult)   Pulse 85   Temp 36.2 °C (97.1 °F) (Temporal)   Resp 18   Ht 1.613 m (5' 3.5\")   Wt 90.7 kg (200 lb)   SpO2 95%   BMI 34.87 kg/m²  Body mass index is 34.87 kg/m².    Gen:  Alert and oriented, No apparent distress.  HEENT: Neck is supple without lymphadenopathy, EOMI, no pharyngeal erythema or exudate  Lungs:  Normal effort, CTA bilaterally, no wheezes, rhonchi, or rales  CV:  Regular rate and rhythm. No murmurs, rubs, or gallops.  Abd:      Soft, non-tender, non-distended  Ext:  No clubbing, cyanosis, edema.      Labs:     Results for orders placed or performed during the hospital encounter of 06/11/24   URINE CULTURE(NEW)    Collection Time: 06/11/24  3:00 PM    Specimen: Urine   Result Value Ref Range    Significant Indicator NEG     Source UR     Site -     Culture Result Usual urogenital [place] 10-50,000 cfu/mL    VAGINAL PATHOGENS DNA PANEL    Collection Time: 06/11/24  3:15 PM   Result Value Ref Range    Candida species DNA Probe Negative Negative    Trichamonas vaginalis DNA Probe Negative Negative    Gardnerella vaginalis DNA Probe POSITIVE (A) Negative       Assessment & Plan:     29 y.o. female with the following -     Problem List Items Addressed This Visit       Encounter for weight loss counseling     29-year-old female  BMI 34.78, /66  Has lost 6 pounds since last appointment  Experiencing brain fog with topiramate  Patient reports she is feeling better and is not experiencing any symptoms of depression or increased anxiety.  No SI/HI    Plan  - Continue phentermine 15 mg daily  - Continue topiramate 25 mg daily  - Encourage patient continue eating a diet rich in fruits and vegetables and getting on 150 minutes of moderate exercise weekly  - Follow-up " appointment in 90 days         Relevant Medications    phentermine 15 MG capsule    topiramate (TOPAMAX) 25 MG Tab         Return in about 3 months (around 2/22/2025).      I advised the patient that I will contact them with all lab, imaging, or procedure results within a week of having the test performed. If they have not received a message/call from our clinic during that expected time period, they are advised to contact our clinic to ensure that the labs/imaging studies were received by this office.     Nancy Schneider MD  [facility]  PGY-3

## 2024-11-22 NOTE — ASSESSMENT & PLAN NOTE
29-year-old female  BMI 34.78, /66  Has lost 6 pounds since last appointment  Experiencing brain fog with topiramate  Patient reports she is feeling better and is not experiencing any symptoms of depression or increased anxiety.  No SI/HI    Plan  - Continue phentermine 15 mg daily  - Continue topiramate 25 mg daily  - Encourage patient continue eating a diet rich in fruits and vegetables and getting on 150 minutes of moderate exercise weekly  - Follow-up appointment in 90 days

## (undated) DEVICE — GOWN WARMING STANDARD FLEX - (30/CA)

## (undated) DEVICE — PAD SANITARY 11IN MAXI IND WRAPPED  (12EA/PK 24PK/CA)

## (undated) DEVICE — GLOVE BIOGEL SZ 6 PF LATEX - (50EA/BX 4BX/CA)

## (undated) DEVICE — MASK OXYGEN VNYL ADLT MED CONC WITH 7 FOOT TUBING  - (50EA/CA)

## (undated) DEVICE — SCRUB SOLUTION EXIDINE 4% 40Z - 48/CS CHLORAHEXADINE GLUCONATE

## (undated) DEVICE — DERMABOND ADVANCED - (12EA/BX)

## (undated) DEVICE — TUBE CONNECTING SUCTION - CLEAR PLASTIC STERILE 72 IN (50EA/CA)

## (undated) DEVICE — Device

## (undated) DEVICE — SET LEADWIRE 5 LEAD BEDSIDE DISPOSABLE ECG (1SET OF 5/EA)

## (undated) DEVICE — SUCTION INSTRUMENT YANKAUER BULBOUS TIP W/O VENT (50EA/CA)

## (undated) DEVICE — LIGASURE LAPAROSCOPIC 5MM - (6EA/CA)

## (undated) DEVICE — SENSOR OXIMETER ADULT SPO2 RD SET (20EA/BX)

## (undated) DEVICE — TOWEL STOP TIMEOUT SAFETY FLAG (40EA/CA)

## (undated) DEVICE — CANISTER SUCTION RIGID RED 1500CC (40EA/CA)

## (undated) DEVICE — GLOVE SZ 6 BIOGEL PI MICRO - PF LF (50PR/BX 4BX/CA)

## (undated) DEVICE — SLEEVE VASO CALF MED - (10PR/CA)

## (undated) DEVICE — LACTATED RINGERS INJ 1000 ML - (14EA/CA 60CA/PF)

## (undated) DEVICE — SUTURE 4-0 MONOCRYL PLUS PS-2 - 27 INCH (36/BX)

## (undated) DEVICE — TUBING CLEARLINK DUO-VENT - C-FLO (48EA/CA)

## (undated) DEVICE — KIT  I.V. START (100EA/CA)

## (undated) DEVICE — CANISTER SUCTION 3000ML MECHANICAL FILTER AUTO SHUTOFF MEDI-VAC NONSTERILE LF DISP  (40EA/CA)

## (undated) DEVICE — TUBE E-T HI-LO CUFF 7.0MM (10EA/PK)

## (undated) DEVICE — GOWN SURGEONS LARGE - (32/CA)

## (undated) DEVICE — TUBING SETDISPOS HIGH FLOW II - (10/BX)

## (undated) DEVICE — TROCAR 5X100 NON BLADED Z-TH - READ KII (6/BX)

## (undated) DEVICE — TROCAR 5X100 SEPARTATOR ADV - FIXATION (6/BX)

## (undated) DEVICE — SUTURE GENERAL

## (undated) DEVICE — SODIUM CHL IRRIGATION 0.9% 1000ML (12EA/CA)

## (undated) DEVICE — GLOVE LITE HANDLE DISP. - (1/PK 80PK/CS)

## (undated) DEVICE — CANNULA W/ SUPPLY TUBING O2 - (50/CA)